# Patient Record
Sex: FEMALE | Race: WHITE | NOT HISPANIC OR LATINO | Employment: FULL TIME | URBAN - METROPOLITAN AREA
[De-identification: names, ages, dates, MRNs, and addresses within clinical notes are randomized per-mention and may not be internally consistent; named-entity substitution may affect disease eponyms.]

---

## 2017-01-10 ENCOUNTER — TELEPHONE (OUTPATIENT)
Dept: SURGERY | Facility: CLINIC | Age: 32
End: 2017-01-10

## 2017-01-10 ENCOUNTER — OFFICE VISIT (OUTPATIENT)
Dept: SURGERY | Facility: CLINIC | Age: 32
End: 2017-01-10
Payer: COMMERCIAL

## 2017-01-10 ENCOUNTER — HOSPITAL ENCOUNTER (OUTPATIENT)
Dept: GENERAL RADIOLOGY | Facility: CLINIC | Age: 32
Discharge: HOME | End: 2017-01-10
Attending: PHYSICIAN ASSISTANT | Admitting: PHYSICIAN ASSISTANT
Payer: COMMERCIAL

## 2017-01-10 VITALS
HEART RATE: 68 BPM | WEIGHT: 159.7 LBS | SYSTOLIC BLOOD PRESSURE: 118 MMHG | BODY MASS INDEX: 27.84 KG/M2 | DIASTOLIC BLOOD PRESSURE: 79 MMHG

## 2017-01-10 DIAGNOSIS — K91.2 POSTSURGICAL MALABSORPTION: ICD-10-CM

## 2017-01-10 DIAGNOSIS — Z98.84 BARIATRIC SURGERY STATUS: Primary | ICD-10-CM

## 2017-01-10 DIAGNOSIS — K21.9 GASTROESOPHAGEAL REFLUX DISEASE WITHOUT ESOPHAGITIS: ICD-10-CM

## 2017-01-10 DIAGNOSIS — E66.3 OVERWEIGHT (BMI 25.0-29.9): ICD-10-CM

## 2017-01-10 DIAGNOSIS — Z46.51 FITTING AND ADJUSTMENT OF GASTRIC LAP BAND: ICD-10-CM

## 2017-01-10 DIAGNOSIS — Z98.84 BARIATRIC SURGERY STATUS: ICD-10-CM

## 2017-01-10 PROCEDURE — S2083 ADJUSTMENT GASTRIC BAND: HCPCS | Performed by: PHYSICIAN ASSISTANT

## 2017-01-10 PROCEDURE — 74240 X-RAY XM UPR GI TRC 1CNTRST: CPT

## 2017-01-10 PROCEDURE — 99207 ZZC NO CHARGE LOS: CPT | Performed by: PHYSICIAN ASSISTANT

## 2017-01-10 RX ORDER — OMEPRAZOLE 40 MG/1
40 CAPSULE, DELAYED RELEASE ORAL DAILY
Qty: 30 CAPSULE | Refills: 0 | Status: SHIPPED | OUTPATIENT
Start: 2017-01-10 | End: 2017-04-28

## 2017-01-10 NOTE — TELEPHONE ENCOUNTER
"Patient had adjustable gastric band placed 4/2011.  She calls today with c/o pain 7-8/10 with eating/drinking and 4-5/10 without eating/drinking. \"It hurts to eat or drink anything.\"  She states the pain started approximately 2 days ago.  She reports she had the \"flu\" about 1.5 weeks ago and was dry heaving.  She is a bit nauseated at this time.  She has gotten in 30 ounces of fluid daily the last 2 days.  She states she has good U.O. - no light headedness or dizziness.  The last time she ate was last night for dinner at 730 pm. She had about 1/2 cup ham and mashed potatoes.  Suspect irritation of band vs. Slipped. Will need UGI to check for placement and fluid to come out.    Spoke with LUIS Mendez - UGI to check for band placement and then have patient come to clinic for fluid to be taken out.    Spoke with radiology and patient has 1300 UGI.    Spoke with patient and she will be at the 1300 UGI and then come to clinic to have fluid removed.  Cynthia Zapata, MS, RD, RN      "

## 2017-01-10 NOTE — PATIENT INSTRUCTIONS
PLAN:    1.  Liquid diet for 2 days  2.  Rx for omeprazole 40 mg daily if necessary for heartburn  3.  Get labs drawn  4.  Start all recommended post op vitamins  5.  Return to clinic in 1 month

## 2017-01-10 NOTE — PROGRESS NOTES
Date: 1/10/2017    RE: AXEL FINLEY  MR#: [id number]  : 1985    VITALS:   Weight: 159.7  BMI: 27.85  Wt Lost since last visit: 8.0  Cumulative Weight loss: 54.0    SUBJECTIVE:  Patient comes to the clinic today for band assessment. In regards to the patient s band, the patient feels she needs fluid removed from her band. She had the stomach flu with vomiting about 1.5 weeks ago. She felt better up until 2 days ago. Her stomach started hurting and she had a hard time eating. No vomiting this time. She has had to take drinking and eating slowly. Denies any tobacco use. Only uses ibuprofen infrequently. Did have several glasses of alcohol about 1.5 weeks ago when she was initially sick. Had UGI done this morning and it was reviewed by Dr Li and discussed with patient. No band slippage. Narrowing was noted. She is satisfied with her weight and/or weight loss. She is not exercising 3x weekly or more. She should be more active but she just has not gotten around to it.  She is not hungry between meals, not eating between meals, not able to eat >1 cup of food at meals. She is not losing 1-2 lbs a week and does not feel a sense of restriction.  She has pain when swallowing. She does not have heartburn, vomiting or reflux. She does not have night cough or hiccups. She is making poor food choices and is unable to eat chicken, steak and bread.  She does not have new illness, will not be traveling to remote areas and will not have a major surgery soon.  ASSESSMENT:  1. S/P adjustable band surgery  2. Malnutrition following GI Surgery  PLAN: After evaluation, we have elected to adjust her gastric band. Risk, benefits, and alternatives were reviewed before a consent was signed. She wishes to proceed. She will follow up in a month for subsequent assessment.  PROCEDURE: Adjustment of gastric band performed by Nikita Winter PA-C and Vanessa Severino PA-C  PROCEDURE DETAILS: In the clinic exam room, the patient was placed in  supine position on the exam table. The area over the access port was prepped with an alcohol swab, gloves were donned, and a Hurley needle and syringe were directed into the port under palpation guidance. After several attempts by Nikita Winter, a third attempt by Vanessa Severino with a new sterile Hurley needle and syringe was successful. A small amount of saline was aspirated to verify location, and 5 mls was removed from the port. This was all the fluid in the band. Access needle was withdrawn and bandaid was applied. Patient sat up and drank 8 ounces of lukewarm water without difficulty. She should return to a liquid diet and advance as tolerated. Tight band warning signs were reviewed. She left home in a stable and ambulatory condition.   Patient was also prescribed one months worth of omeprazole 40 mg tabs. She can take one per day if necessary for her heartburn symptoms. Advised she wait at least one month before considering adding fluid back to band. Reviewed her medications and she is not currently taking all of her recommended post op vitamins. Just taking one MVI daily. Ordered labs including CBC, Vitamin B12, PTH, and Vitamin D. Also discussed importance of lifestyle decisions such as excessive alcohol and NSAID use in patients with a lap band. Patient verbalized understanding.

## 2017-01-18 ENCOUNTER — TELEPHONE (OUTPATIENT)
Dept: FAMILY MEDICINE | Facility: CLINIC | Age: 32
End: 2017-01-18

## 2017-01-18 DIAGNOSIS — Z30.45 ENCOUNTER FOR SURVEILLANCE OF TRANSDERMAL PATCH HORMONAL CONTRACEPTIVE DEVICE: Primary | ICD-10-CM

## 2017-01-18 NOTE — TELEPHONE ENCOUNTER
Reason for Call: Nuvaring not covered by her insurance  Detailed comments: Pt says she went to fill her birth control yesterday and her insurance does not cover this medication. Rather than doing a prior auth, she is asking if it could be switched to a birth control patch. Wilver Manning  Phone Number Patient can be reached at: Home number on file [phone] (home)    Best Time: anytime    Can we leave a detailed message on this number? YES    Call taken on 1/18/2017 at 8:23 AM by Linda Becerra

## 2017-01-19 ENCOUNTER — TELEPHONE (OUTPATIENT)
Dept: FAMILY MEDICINE | Facility: CLINIC | Age: 32
End: 2017-01-19

## 2017-01-19 DIAGNOSIS — Z30.011 ENCOUNTER FOR INITIAL PRESCRIPTION OF CONTRACEPTIVE PILLS: Primary | ICD-10-CM

## 2017-01-19 RX ORDER — NORELGESTROMIN AND ETHINYL ESTRADIOL 35; 150 UG/MG; UG/MG
1 PATCH TRANSDERMAL WEEKLY
Qty: 9 PATCH | Refills: 3 | Status: SHIPPED | OUTPATIENT
Start: 2017-01-19 | End: 2017-01-19

## 2017-01-19 RX ORDER — DESOGESTREL AND ETHINYL ESTRADIOL 0.15-0.03
1 KIT ORAL DAILY
Qty: 84 TABLET | Refills: 3 | Status: SHIPPED | OUTPATIENT
Start: 2017-01-19 | End: 2017-05-08

## 2017-01-19 RX ORDER — ETONOGESTREL AND ETHINYL ESTRADIOL VAGINAL RING .015; .12 MG/D; MG/D
1 RING VAGINAL
Qty: 3 EACH | Status: CANCELLED | OUTPATIENT
Start: 2017-01-19

## 2017-01-19 NOTE — TELEPHONE ENCOUNTER
I entered the order for the patch. When I placed it in epic it showed that it was not covered by her insurance. We may need to do a prior auth on this.    Erna Gutierrez NP

## 2017-01-19 NOTE — TELEPHONE ENCOUNTER
Pt called yesterday about her birth control because the NuvaRing was not covered by her insurance. She requested patches but apparently that is not covered either and would require a prior auth. She says she should have started her new NuvaRing last weekend. She is willing to switch to an oral birth control pill that is covered so she doesn't have to wait.

## 2017-04-19 ENCOUNTER — VIRTUAL VISIT (OUTPATIENT)
Dept: FAMILY MEDICINE | Facility: OTHER | Age: 32
End: 2017-04-19

## 2017-04-20 NOTE — PROGRESS NOTES
Date:   Clinician: Elise De La Cruz  Clinician NPI: [id number]  Patient: Tram Bal  Patient : 1985  Patient Address: [address] [place] [address]  Patient Phone: [phone]  Visit Protocol: Allergic rhinitis  Patient Summary:  Tram is a 31 year old ( : 1985 ) female who initiated a Zip for evaluation of seasonal allergies.      She claims that his/her current allergy medications are ineffective. She notes coughing, itchy eyes, headache, plugged ears, fatigue, scratchy throat, sneezing, and runny nose with headache as the most bothersome.  These symptoms have been present for several days.   She also notes more tears than usual and facial pressure. Her mucus is clear or white.  She sneezes sometimes (a few times every hour). Smoke aggravate these symptoms.   Tram has not received allergy shots. The patient's allergy symptoms have NOT been confirmed by skin allergy testing. Family history of allergies is unknown.   She denies having nasal ulcers, a septum deviation or other sinus wounds, wheezing, dyspnea, fever, white patches on tonsils, noticing rashes appearing anywhere, and tooth pain.   She denies smoking or using smokeless tobacco.   PREVIOUS ALLERGY MEDICATIONS:     Fexofenadine (Allegra); NOT effective    Diphenhydramine (Benadryl); somewhat effective     Additional information as reported by the patient (free text): I am seeking relief from the sinus pressure and pain, and so far what I have been taking helps with other allergy symptoms but not that.   MEDICATIONS:  Over-the-counter meds:   Prescription medications:  SSRIs (such as Prozac, Celexa, Lexapro, Paxil, Luvox, or Zoloft) and Celexa  , ALLERGIES:  Patient free text response:   Famvir   The patient is able to swallow and is not concerned about having strep throat.   Clinician Response:  Dear Tram,  Based on the information you have provided, you have Adult Allergic Rhinitis commonly called hay  fever or seasonal allergy.   I am prescribing:   Dymista (azelastine/fluticasone propionate) to treat your allergy symptoms. Use one spray in each nostril two times a day. There are 6 refills included with this medication.   Singulair (montelukast) 5 mg chewable tablet to treat your allergy symptoms. Take one tablet by mouth one time a day. There are 6 refills included with this medication.   Please take the following precautions to help reduce your symptoms:     Avoid substances you are allergic to    Try to reduce the amount of humidity in your living and working environments    Consider moving pets outside of your living and/or working area    You can decrease your allergy symptoms by staying indoors as much as possible until your allergy season is over.      Diagnosis: Adult Allergic Rhinitis 12+  Diagnosis ICD: J30.9  Prescription: montelukast (Singulair) 5mg chewable tablet 30 tablets, 30 days supply. Take one tablet by mouth one time a day. Refills: 6, Refill as needed: no, Allow substitutions: yes  Prescription: Dymista (azelastine/fluticasone propionate) 137-50 mcg nasal spray  23 gm, 30 days supply. Use one spray each nostril two times a day. Refills: 6, Refill as needed: no, Allow substitutions: yes  Prescription Sent At: April 19 23:45:29, 2017  Pharmacy: [id number] - [phone] - [address] [place] [address]

## 2017-04-28 ENCOUNTER — OFFICE VISIT (OUTPATIENT)
Dept: FAMILY MEDICINE | Facility: CLINIC | Age: 32
End: 2017-04-28
Payer: COMMERCIAL

## 2017-04-28 VITALS
HEART RATE: 88 BPM | SYSTOLIC BLOOD PRESSURE: 110 MMHG | DIASTOLIC BLOOD PRESSURE: 64 MMHG | WEIGHT: 180.2 LBS | BODY MASS INDEX: 30.77 KG/M2 | HEIGHT: 64 IN

## 2017-04-28 DIAGNOSIS — Z13.6 CARDIOVASCULAR SCREENING; LDL GOAL LESS THAN 160: ICD-10-CM

## 2017-04-28 DIAGNOSIS — Z01.419 ENCOUNTER FOR GYNECOLOGICAL EXAMINATION WITHOUT ABNORMAL FINDING: Primary | ICD-10-CM

## 2017-04-28 PROCEDURE — G0145 SCR C/V CYTO,THINLAYER,RESCR: HCPCS | Performed by: NURSE PRACTITIONER

## 2017-04-28 PROCEDURE — 99395 PREV VISIT EST AGE 18-39: CPT | Performed by: NURSE PRACTITIONER

## 2017-04-28 PROCEDURE — 87624 HPV HI-RISK TYP POOLED RSLT: CPT | Performed by: NURSE PRACTITIONER

## 2017-04-28 PROCEDURE — G0124 SCREEN C/V THIN LAYER BY MD: HCPCS | Performed by: NURSE PRACTITIONER

## 2017-04-28 NOTE — NURSING NOTE
"Chief Complaint   Patient presents with     Physical       Initial /64 (Cuff Size: Adult Regular)  Pulse 88  Ht 5' 3.5\" (1.613 m)  Wt 180 lb 3.2 oz (81.7 kg)  LMP 03/31/2017 (Approximate)  BMI 31.42 kg/m2 Estimated body mass index is 31.42 kg/(m^2) as calculated from the following:    Height as of this encounter: 5' 3.5\" (1.613 m).    Weight as of this encounter: 180 lb 3.2 oz (81.7 kg).  Medication Reconciliation: complete    Health Maintenance that is potentially due pending provider review:  Pap Smear    Possibly completing today per provider review.      "

## 2017-04-28 NOTE — PROGRESS NOTES
SUBJECTIVE:     CC: Tram Bal is an 31 year old woman who presents for preventive health visit.     Healthy Habits:    Do you get at least three servings of calcium containing foods daily (dairy, green leafy vegetables, etc.)? yes    Amount of exercise or daily activities, outside of work: Dance and Volleyball     Problems taking medications regularly No    Medication side effects: No    Have you had an eye exam in the past two years? no    Do you see a dentist twice per year? yes  Do you have sleep apnea, excessive snoring or daytime drowsiness?no          Today's PHQ-2 Score:   PHQ-2 ( 1999 Pf[employer]) 4/20/2016 4/20/2015   Q1: Little interest or pleasure in doing things - 0   Q2: Feeling down, depressed or hopeless - 0   PHQ-2 Score - 0   Little interest or pleasure in doing things Not at all -   Feeling down, depressed or hopeless Not at all -   PHQ-2 Score 0 -       Abuse: Current or Past(Physical, Sexual or Emotional)- No  Do you feel safe in your environment - Yes    Social History   Substance Use Topics     Smoking status: Former Smoker     Packs/day: 0.50     Years: 3.00     Quit date: 5/1/2010     Smokeless tobacco: Never Used      Comment: quit smoking may 2010      Alcohol use Yes      Comment: occasionally     The patient does not drink >3 drinks per day nor >7 drinks per week.    Recent Labs   Lab Test  04/20/15   0947  03/31/14   1140   CHOL  216*  172   HDL  79  67   LDL  110  74   TRIG  136  159*   CHOLHDLRATIO  2.7  2.6       Reviewed orders with patient.  Reviewed health maintenance and updated orders accordingly - Yes    Mammo Decision Support:  Mammogram not appropriate for this patient based on age.    Pertinent mammograms are reviewed under the imaging tab.  History of abnormal Pap smear: YES - updated in Problem List and Health Maintenance accordingly    Reviewed and updated as needed this visit by clinical staff  Tobacco  Allergies  Meds  Med Hx  Surg Hx  Fam Hx  Soc Hx     "    Reviewed and updated as needed this visit by Provider            ROS:  C: NEGATIVE for fever, chills, change in weight  I: NEGATIVE for worrisome rashes, moles or lesions  E: NEGATIVE for vision changes or irritation  ENT: NEGATIVE for ear, mouth and throat problems  R: NEGATIVE for significant cough or SOB  B: NEGATIVE for masses, tenderness or discharge  CV: NEGATIVE for chest pain, palpitations or peripheral edema  GI: NEGATIVE for nausea, abdominal pain, heartburn, or change in bowel habits  : NEGATIVE for unusual urinary or vaginal symptoms. Periods are regular.  M: NEGATIVE for significant arthralgias or myalgia  N: NEGATIVE for weakness, dizziness or paresthesias  P: NEGATIVE for changes in mood or affect    Problem list, Medication list, Allergies, and Medical/Social/Surgical histories reviewed in EPIC and updated as appropriate.  OBJECTIVE:     /64 (Cuff Size: Adult Regular)  Pulse 88  Ht 5' 3.5\" (1.613 m)  Wt 180 lb 3.2 oz (81.7 kg)  LMP 03/31/2017 (Approximate)  BMI 31.42 kg/m2  EXAM:  GENERAL: healthy, alert and no distress  EYES: Eyes grossly normal to inspection, PERRL and conjunctivae and sclerae normal  HENT: ear canals and TM's normal, nose and mouth without ulcers or lesions  NECK: no adenopathy, no asymmetry, masses, or scars and thyroid normal to palpation  RESP: lungs clear to auscultation - no rales, rhonchi or wheezes  BREAST: normal without masses, tenderness or nipple discharge and no palpable axillary masses or adenopathy  CV: regular rate and rhythm, normal S1 S2, no S3 or S4, no murmur, click or rub, no peripheral edema and peripheral pulses strong  ABDOMEN: soft, nontender, no hepatosplenomegaly, no masses and bowel sounds normal   (female): normal female external genitalia, normal urethral meatus, vaginal mucosa pink, moist, well rugated, and normal cervix/adnexa/uterus without masses or discharge  MS: no gross musculoskeletal defects noted, no edema  SKIN: no " "suspicious lesions or rashes  NEURO: Normal strength and tone, mentation intact and speech normal  PSYCH: mentation appears normal, affect normal/bright    ASSESSMENT/PLAN:     1. Encounter for gynecological examination without abnormal finding    - Pap imaged thin layer screen with HPV - recommended age 30 - 65  - HPV High Risk Types DNA Cervical  - TSH with free T4 reflex; Future    2. CARDIOVASCULAR SCREENING; LDL GOAL LESS THAN 160    - Lipid Profile with reflex to direct LDL; Future    COUNSELING:   Reviewed preventive health counseling, as reflected in patient instructions         reports that she quit smoking about 6 years ago. She has a 1.50 pack-year smoking history. She has never used smokeless tobacco.    Estimated body mass index is 31.42 kg/(m^2) as calculated from the following:    Height as of this encounter: 5' 3.5\" (1.613 m).    Weight as of this encounter: 180 lb 3.2 oz (81.7 kg).   Weight management plan: Discussed healthy diet and exercise guidelines and patient will follow up in 12 months in clinic to re-evaluate.    Counseling Resources:  ATP IV Guidelines  Pooled Cohorts Equation Calculator  Breast Cancer Risk Calculator  FRAX Risk Assessment  ICSI Preventive Guidelines  Dietary Guidelines for Americans, 2010  USDA's MyPlate  ASA Prophylaxis  Lung CA Screening    DEON Rosario [facility]  "

## 2017-04-28 NOTE — MR AVS SNAPSHOT
After Visit Summary   4/28/2017    Tram Bal    MRN: [id number]           Patient Information     Date Of Birth          1985        Visit Information        Provider Department      4/28/2017 11:20 AM Trudi Guevara APRN [facility]        Today's Diagnoses     Encounter for gynecological examination without abnormal finding    -  1    CARDIOVASCULAR SCREENING; LDL GOAL LESS THAN 160          Care Instructions      Preventive Health Recommendations  Female Ages 26 - 39  Yearly exam:   See your health care provider every year in order to    Review health changes.     Discuss preventive care.      Review your medicines if you your doctor has prescribed any.    Until age 30: Get a Pap test every three years (more often if you have had an abnormal result).    After age 30: Talk to your doctor about whether you should have a Pap test every 3 years or have a Pap test with HPV screening every 5 years.   You do not need a Pap test if your uterus was removed (hysterectomy) and you have not had cancer.  You should be tested each year for STDs (sexually transmitted diseases), if you're at risk.   Talk to your provider about how often to have your cholesterol checked.  If you are at risk for diabetes, you should have a diabetes test (fasting glucose).  Shots: Get a flu shot each year. Get a tetanus shot every 10 years.   Nutrition:     Eat at least 5 servings of fruits and vegetables each day.    Eat whole-grain bread, whole-wheat pasta and brown rice instead of white grains and rice.    Talk to your provider about Calcium and Vitamin D.     Lifestyle    Exercise at least 150 minutes a week (30 minutes a day, 5 days of the week). This will help you control your weight and prevent disease.    Limit alcohol to one drink per day.    No smoking.     Wear sunscreen to prevent skin cancer.    See your dentist every six months for an exam and cleaning.          Follow-ups after  "your visit        Future tests that were ordered for you today     Open Future Orders        Priority Expected Expires Ordered    TSH with free T4 reflex Routine  4/28/2018 4/28/2017    Lipid Profile with reflex to direct LDL Routine  4/28/2018 4/28/2017            Who to contact     If you have questions or need follow up information about today's clinic visit or your schedule please contact [facility] directly at [phone].  Normal or non-critical lab and imaging results will be communicated to you by Rapid[employer]ines[employer], letter or phone within 4 business days after the clinic has received the results. If you do not hear from us within 7 days, please contact the clinic through [facility] or phone. If you have a critical or abnormal lab result, we will notify you by phone as soon as possible.  Submit refill requests through [facility] or call your pharmacy and they will forward the refill request to us. Please allow 3 business days for your refill to be completed.          Additional Information About Your Visit        R[employer]ines[employer] Information     [facility] gives you secure access to your electronic health record. If you see a primary care provider, you can also send messages to your care team and make appointments. If you have questions, please call your primary care clinic.  If you do not have a primary care provider, please call [phone] and they will assist you.        Care EveryWhere ID     This is your Care EveryWhere ID. This could be used by other organizations to access your [facility] medical records  [id number]        Your Vitals Were     Pulse Height Last Period BMI (Body Mass Index)          88 5' 3.5\" (1.613 m) 03/31/2017 (Approximate) 31.42 kg/m2         Blood Pressure from Last 3 Encounters:   04/28/17 110/64   01/10/17 118/79   07/18/16 112/74    Weight from Last 3 Encounters:   04/28/17 180 lb 3.2 oz (81.7 kg)   01/10/17 159 lb 11.2 oz (72.4 kg)   07/18/16 158 lb (71.7 kg)              We " Performed the Following     HPV High Risk Types DNA Cervical     Pap imaged thin layer screen with HPV - recommended age 30 - 65          Today's Medication Changes          These changes are accurate as of: 4/28/17 11:58 AM.  If you have any questions, ask your nurse or doctor.               Stop taking these medicines if you haven't already. Please contact your care team if you have questions.     omeprazole 40 MG capsule   Commonly known as:  priLOSEC   Stopped by:  Trudi Guevara APRN CNP                    Primary Care Provider Office Phone # Fax #    DEON Ochoa [phone]       [facility] [address] [address]        Thank you!     Thank you for choosing [facility]  for your care. Our goal is always to provide you with excellent care. Hearing back from our patients is one way we can continue to improve our services. Please take a few minutes to complete the written survey that you may receive in the mail after your visit with us. Thank you!             Your Updated Medication List - Protect others around you: Learn how to safely use, store and throw away your medicines at www.disposemymeds.org.          This list is accurate as of: 4/28/17 11:58 AM.  Always use your most recent med list.                   Brand Name Dispense Instructions for use    acyclovir 400 MG tablet    ZOVIRAX    30 tablet    Take 1 tablet (400 mg) by mouth 3 times daily       citalopram 20 MG tablet    celeXA    90 tablet    Take 1 tablet (20 mg) by mouth daily       cyclobenzaprine 10 MG tablet    FLEXERIL    14 tablet    Take 0.5-1 tablets by mouth 3 times daily as needed for muscle spasms.       desogestrel-ethinyl estradiol 0.15-30 MG-MCG per tablet    APRI    84 tablet    Take 1 tablet by mouth daily       Multiple Vitamins-Iron Tabs      Take by mouth daily

## 2017-04-28 NOTE — LETTER
[facility]  [address] [place] [id number]  [phone]        May 8, 2018    Tram Bal  [address] [place] [id number]              Dear Tram Bal    This is to remind you that your provider wanted you to return to the clinic for fasting lab test(s),    please fast for 10-12 hours. Morning medications can be taken with water.    You may call our office at [facility] at [phone] to schedule an appointment.    Please disregard this notice if you have already had your labs drawn or made an appointment.          Sincerely,        Trudi Guevara CNP

## 2017-05-03 LAB
COPATH REPORT: ABNORMAL
PAP: ABNORMAL

## 2017-05-05 LAB
FINAL DIAGNOSIS: ABNORMAL
HPV HR 12 DNA CVX QL NAA+PROBE: POSITIVE
HPV16 DNA SPEC QL NAA+PROBE: NEGATIVE
HPV18 DNA SPEC QL NAA+PROBE: NEGATIVE
SPECIMEN DESCRIPTION: ABNORMAL

## 2017-05-07 ENCOUNTER — MYC MEDICAL ADVICE (OUTPATIENT)
Dept: FAMILY MEDICINE | Facility: CLINIC | Age: 32
End: 2017-05-07

## 2017-05-07 DIAGNOSIS — Z98.84 LAP-BAND SURGERY STATUS: ICD-10-CM

## 2017-05-07 DIAGNOSIS — Z30.015 ENCOUNTER FOR INITIAL PRESCRIPTION OF VAGINAL RING HORMONAL CONTRACEPTIVE: Primary | ICD-10-CM

## 2017-05-08 RX ORDER — ETONOGESTREL AND ETHINYL ESTRADIOL VAGINAL RING .015; .12 MG/D; MG/D
RING VAGINAL
Qty: 9 EACH | Refills: 3 | Status: SHIPPED | OUTPATIENT
Start: 2017-05-08 | End: 2017-08-11

## 2017-05-22 ENCOUNTER — TELEPHONE (OUTPATIENT)
Dept: FAMILY MEDICINE | Facility: CLINIC | Age: 32
End: 2017-05-22

## 2017-05-22 ENCOUNTER — OFFICE VISIT (OUTPATIENT)
Dept: FAMILY MEDICINE | Facility: CLINIC | Age: 32
End: 2017-05-22
Payer: COMMERCIAL

## 2017-05-22 ENCOUNTER — OFFICE VISIT (OUTPATIENT)
Dept: SURGERY | Facility: CLINIC | Age: 32
End: 2017-05-22
Payer: COMMERCIAL

## 2017-05-22 VITALS
HEART RATE: 72 BPM | WEIGHT: 185.8 LBS | DIASTOLIC BLOOD PRESSURE: 76 MMHG | SYSTOLIC BLOOD PRESSURE: 120 MMHG | BODY MASS INDEX: 31.89 KG/M2 | TEMPERATURE: 98.8 F

## 2017-05-22 VITALS — WEIGHT: 185.8 LBS | BODY MASS INDEX: 31.72 KG/M2 | HEIGHT: 64 IN

## 2017-05-22 DIAGNOSIS — E66.9 OBESITY (BMI 30-39.9): ICD-10-CM

## 2017-05-22 DIAGNOSIS — Z46.51 FITTING AND ADJUSTMENT OF GASTRIC LAP BAND: ICD-10-CM

## 2017-05-22 DIAGNOSIS — F41.1 GENERALIZED ANXIETY DISORDER: Primary | ICD-10-CM

## 2017-05-22 DIAGNOSIS — R87.610 PAPANICOLAOU SMEAR OF CERVIX WITH ATYPICAL SQUAMOUS CELLS OF UNDETERMINED SIGNIFICANCE (ASC-US): Primary | ICD-10-CM

## 2017-05-22 DIAGNOSIS — Z98.84 BARIATRIC SURGERY STATUS: Primary | ICD-10-CM

## 2017-05-22 PROCEDURE — 88305 TISSUE EXAM BY PATHOLOGIST: CPT | Performed by: FAMILY MEDICINE

## 2017-05-22 PROCEDURE — 88175 CYTOPATH C/V AUTO FLUID REDO: CPT | Performed by: FAMILY MEDICINE

## 2017-05-22 PROCEDURE — 99207 ZZC NO CHARGE LOS: CPT | Performed by: PHYSICIAN ASSISTANT

## 2017-05-22 PROCEDURE — 88141 CYTOPATH C/V INTERPRET: CPT | Performed by: FAMILY MEDICINE

## 2017-05-22 PROCEDURE — S2083 ADJUSTMENT GASTRIC BAND: HCPCS | Performed by: PHYSICIAN ASSISTANT

## 2017-05-22 PROCEDURE — 57421 EXAM/BIOPSY OF VAG W/SCOPE: CPT | Performed by: FAMILY MEDICINE

## 2017-05-22 PROCEDURE — G0476 HPV COMBO ASSAY CA SCREEN: HCPCS | Performed by: FAMILY MEDICINE

## 2017-05-22 ASSESSMENT — ANXIETY QUESTIONNAIRES
6. BECOMING EASILY ANNOYED OR IRRITABLE: NEARLY EVERY DAY
5. BEING SO RESTLESS THAT IT IS HARD TO SIT STILL: MORE THAN HALF THE DAYS
7. FEELING AFRAID AS IF SOMETHING AWFUL MIGHT HAPPEN: SEVERAL DAYS
1. FEELING NERVOUS, ANXIOUS, OR ON EDGE: NEARLY EVERY DAY
3. WORRYING TOO MUCH ABOUT DIFFERENT THINGS: MORE THAN HALF THE DAYS
GAD7 TOTAL SCORE: 14
2. NOT BEING ABLE TO STOP OR CONTROL WORRYING: MORE THAN HALF THE DAYS

## 2017-05-22 ASSESSMENT — PATIENT HEALTH QUESTIONNAIRE - PHQ9: 5. POOR APPETITE OR OVEREATING: SEVERAL DAYS

## 2017-05-22 NOTE — PATIENT INSTRUCTIONS
Bariatric Postoperative Vitamins     1 Complete multivitamins with minerals* (at different times than calcium)   2 tablets at bedtime   Vitamin D   2000 Int. Units Daily     Calcium+D  1043-7383 mg in divided doses (Do not take at same time as multivitamin or iron)  500 mg three times daily or 600 mg twice daily.    For menstruating women:  Iron supplement with vitamin C   1 at bedtime   *Multivitamin should contain at least     18 mg of Iron, 400 mg of Folic Acid,  2 mg of Copper,  1.5 mg of Thiamine.      Band Post-Adjustment Instructions    After an adjustment, it is very important to change your diet to full liquids for 2-3 days afterwards.  If this consistency goes down well, advance your diet as tolerated back to solids over the next couple of days.    If you are in the red zone.  Please call [phone] and come back to the clinic right away so we can remove some fluid.    Green Zone:  Not hungry  Losing 1-2 lbs a week  Portion Control  Patient satisfaction  Red Zone:  reflux  vomiting  pain when eating, night cough  inability to eat solids  poor weight loss  Yellow Zone:  hungry between meals  not losing weight  eating larger portions

## 2017-05-22 NOTE — PROGRESS NOTES
SUBJECTIVE:                                                    Tram Bal is a 31 year old female who presents to clinic today for the following health issues:      Colposcopy        Pt with no sxs ASCUS pap with pos HPV other HR type          Previous history of abnormal paps?: Yes see problem list   History of cryotherapy (freezing)?: : No  History of veneral diseases: : No  Do you desire testing for any of these diseases? : No  History of genital warts:  No  Visible warts now?:  No  Previously treated? If so, how?:  No     Patient Active Problem List    Diagnosis Date Noted                                                                                                                                                             Carcinoma in situ of cervix uteri 08/22/2005     Priority: Medium     7/04: LSIL, 10/04: [place]- JARETH I  4/05: LSIL. 6/05: [place] - JARETH II & III  8/05: LEEP - JARETH III  12/05: NIL pap  4/06: ASCUS, neg HPV  NIL paps: 10/06, 11/07, 12/08  1/10: ASCUS, neg HPV  NIL paps: 2/11, 4/10/12 pap NIL  2/20/13 pap NIL  3/31/14 pap NIL/neg HPV. Plan-- repeat pap/HPV in 1 year. If NIL/neg HPV again at that time, patient will be able to go to q 3 yr testing.  4/20/15 pap NIL/neg HPV. Plan: repeat pap and HPV testing in 3 years.  4/25/16: Pap - ASCUS, Neg HPV. Cotest in 1 year.   4/28/17:Pap: ASCUS, +High Risk HPV (Neg 16/18).  Plan [place]-                        Problem list and histories reviewed & adjusted, as indicated.  Additional history: as documented    Labs reviewed in EPIC    Reviewed and updated as needed this visit by clinical staff  Tobacco  Allergies  Meds  Problems  Med Hx  Surg Hx  Fam Hx  Soc Hx        Reviewed and updated as needed this visit by Provider  Allergies  Meds  Problems         ROS:  Constitutional, HEENT, cardiovascular, pulmonary, gi and gu systems are negative, except as otherwise noted.    OBJECTIVE:                                                    /76  (BP Location: Right arm, Patient Position: Chair, Cuff Size: Adult Large)  Pulse 72  Temp 98.8  F (37.1  C) (Tympanic)  Wt 185 lb 12.8 oz (84.3 kg)  LMP 03/31/2017 (Approximate)  Breastfeeding? No  BMI 31.89 kg/m2  Body mass index is 31.89 kg/(m^2).  GENERAL APPEARANCE: healthy, alert and no distress   (female): normal cervix, adnexae, and uterus without masses or discharge  PSYCH: mentation appears normal and affect normal/bright           PROCEDURE:  Before the procedure, it was ensured that the patient was educated regarding the nature of her findings to date, the implications of them, and what was to be done. She has been made aware of the role of HPV, the natural history of infection, ways to minimize her future risk, the effect of HPV on the cervix, and treatment options available should they be indicated. The   pathophysiology of the cervix, including a discussion of squamous vs. endometrial cells, and squamous metaplasia have all been reviewed, using illustrations and sketches. The details of the colposcopic procedure were reviewed, as well as the risks of missed diagnoses, pain, infection and bleeding. All questions were answered before proceeding, and informed consent was therefore obtained.    Bimanual examination: was not done  Unenhanced examination of the cervix was normal without lesions.      Pap repeated?:  Yes   SCJ seen?:  yes  Endocervical speculum needed?:  No  ECC done?:  Yes   Lugol's solution used?:  No  Satisfactory examination?:  yes    Vaginal vault: normal to cursory inspection nl  Urethra normal?:  yes  Labia normal?:  yes  Perineum normal?:  yes  Rectum normal?:  yes    FINDINGS:    Cervix: no visible lesions  Procedure: biopsies taken (not including ECC): 1.         ASSESSMENT/PLAN:                                                    1. Papanicolaou smear of cervix with atypical squamous cells of undetermined significance (ASC-US)    - Pap imaged thin layer diagnostic with HPV  (select HPV order below)  - Surgical pathology exam  - COLP VAGINA W CERVIX IF PRES W BIOPSY      Patient Instructions   No sex or tampons for 2-3 days    I will call you with results of pathology in the next one week     Risks, benefits, side effects and rationale for treatment plan fully discussed with the patient and understanding expressed.   Batool Liang MD  [facility]

## 2017-05-22 NOTE — NURSING NOTE
"Chief Complaint   Patient presents with     Colposcopy       Initial /76 (BP Location: Right arm, Patient Position: Chair, Cuff Size: Adult Large)  Pulse 72  Temp 98.8  F (37.1  C) (Tympanic)  Wt 185 lb 12.8 oz (84.3 kg)  LMP 03/31/2017 (Approximate)  Breastfeeding? No  BMI 31.89 kg/m2 Estimated body mass index is 31.89 kg/(m^2) as calculated from the following:    Height as of an earlier encounter on 5/22/17: 5' 4\" (1.626 m).    Weight as of this encounter: 185 lb 12.8 oz (84.3 kg).  Medication Reconciliation: complete    Health Maintenance that is potentially due pending provider review:  PHQ9    n/a    "

## 2017-05-22 NOTE — MR AVS SNAPSHOT
After Visit Summary   5/22/2017    Tram Bal    MRN: [id number]           Patient Information     Date Of Birth          1985        Visit Information        Provider Department      5/22/2017 1:20 PM Batool Liang MD [facility]        Today's Diagnoses     Carcinoma in situ of cervix uteri    -  1      Care Instructions    No sex or tampons for 2-3 days    I will call you with results of pathology in the next one week         Follow-ups after your visit        Who to contact     If you have questions or need follow up information about today's clinic visit or your schedule please contact [facility] directly at [phone].  Normal or non-critical lab and imaging results will be communicated to you by MyChart, letter or phone within 4 business days after the clinic has received the results. If you do not hear from us within 7 days, please contact the clinic through Forerunhart or phone. If you have a critical or abnormal lab result, we will notify you by phone as soon as possible.  Submit refill requests through DineroTaxi or call your pharmacy and they will forward the refill request to us. Please allow 3 business days for your refill to be completed.          Additional Information About Your Visit        MyChart Information     DineroTaxi gives you secure access to your electronic health record. If you see a primary care provider, you can also send messages to your care team and make appointments. If you have questions, please call your primary care clinic.  If you do not have a primary care provider, please call [phone] and they will assist you.        Care EveryWhere ID     This is your Care EveryWhere ID. This could be used by other organizations to access your [facility] medical records  [id number]        Your Vitals Were     Pulse Temperature Last Period Breastfeeding? BMI (Body Mass Index)       72 98.8  F (37.1  C) (Tympanic) 03/31/2017  (Approximate) No 31.89 kg/m2        Blood Pressure from Last 3 Encounters:   05/22/17 120/76   04/28/17 110/64   01/10/17 118/79    Weight from Last 3 Encounters:   05/22/17 185 lb 12.8 oz (84.3 kg)   05/22/17 185 lb 12.8 oz (84.3 kg)   04/28/17 180 lb 3.2 oz (81.7 kg)              We Performed the Following     Pap imaged thin layer diagnostic with HPV (select HPV order below)     Surgical pathology exam        Primary Care Provider Office Phone # Fax #    Trudi Guevara DEON [phone]       [facility] [address] [address]        Thank you!     Thank you for choosing [facility]  for your care. Our goal is always to provide you with excellent care. Hearing back from our patients is one way we can continue to improve our services. Please take a few minutes to complete the written survey that you may receive in the mail after your visit with us. Thank you!             Your Updated Medication List - Protect others around you: Learn how to safely use, store and throw away your medicines at www.disposemymeds.org.          This list is accurate as of: 5/22/17  2:03 PM.  Always use your most recent med list.                   Brand Name Dispense Instructions for use    acyclovir 400 MG tablet    ZOVIRAX    30 tablet    Take 1 tablet (400 mg) by mouth 3 times daily       citalopram 20 MG tablet    celeXA    90 tablet    Take 1 tablet (20 mg) by mouth daily       etonogestrel-ethinyl estradiol 0.12-0.015 MG/24HR vaginal ring    NUVARING    9 each    Insert 1 ring vaginally every 21 days then remove for 1 week then repeat with new ring.       Multiple Vitamins-Iron Tabs      Take by mouth daily

## 2017-05-22 NOTE — TELEPHONE ENCOUNTER
Tram states she would like to talk to you regarding medication changes for her depression and anxiety.  Symptoms seem to have worsened since she saw you a couple weeks ago and she wonders if she needs a medication change.  PHQ 9 completed today scored 17.     CLARA scored: 14.  She states she is not a danger to herself or others.  Please call

## 2017-05-22 NOTE — MR AVS SNAPSHOT
After Visit Summary   5/22/2017    Tram Bal    MRN: [id number]           Patient Information     Date Of Birth          1985        Visit Information        Provider Department      5/22/2017 9:30 AM Vanessa Severino PA-C [facility]      Today's Diagnoses     Bariatric surgery status    -  1    Obesity (BMI 30-39.9)          Care Instructions    Bariatric Postoperative Vitamins     1 Complete multivitamins with minerals* (at different times than calcium)   2 tablets at bedtime   Vitamin D   2000 Int. Units Daily     Calcium+D  8384-5514 mg in divided doses (Do not take at same time as multivitamin or iron)  500 mg three times daily or 600 mg twice daily.    For menstruating women:  Iron supplement with vitamin C   1 at bedtime   *Multivitamin should contain at least     18 mg of Iron, 400 mg of Folic Acid,  2 mg of Copper,  1.5 mg of Thiamine.      Band Post-Adjustment Instructions    After an adjustment, it is very important to change your diet to full liquids for 2-3 days afterwards.  If this consistency goes down well, advance your diet as tolerated back to solids over the next couple of days.    If you are in the red zone.  Please call [phone] and come back to the clinic right away so we can remove some fluid.    Green Zone:  Not hungry  Losing 1-2 lbs a week  Portion Control  Patient satisfaction  Red Zone:  reflux  vomiting  pain when eating, night cough  inability to eat solids  poor weight loss  Yellow Zone:  hungry between meals  not losing weight  eating larger portions          Follow-ups after your visit        Your next 10 appointments already scheduled     May 22, 2017  1:20 PM CDT   SHORT with Batool Liang MD   [facility]    [address] [place] [address] [phone]              Who to contact     If you  "have questions or need follow up information about today's clinic visit or your schedule please contact [facility] directly at [phone].  Normal or non-critical lab and imaging results will be communicated to you by MyChart, letter or phone within 4 business days after the clinic has received the results. If you do not hear from us within 7 days, please contact the clinic through GameSaladhart or phone. If you have a critical or abnormal lab result, we will notify you by phone as soon as possible.  Submit refill requests through MyParichay or call your pharmacy and they will forward the refill request to us. Please allow 3 business days for your refill to be completed.          Additional Information About Your Visit        MyParichay Information     MyParichay gives you secure access to your electronic health record. If you see a primary care provider, you can also send messages to your care team and make appointments. If you have questions, please call your primary care clinic.  If you do not have a primary care provider, please call [phone] and they will assist you.        Care EveryWhere ID     This is your Care EveryWhere ID. This could be used by other organizations to access your [facility] medical records  [id number]        Your Vitals Were     Height Last Period BMI (Body Mass Index)             5' 4\" (1.626 m) 03/31/2017 (Approximate) 31.89 kg/m2          Blood Pressure from Last 3 Encounters:   04/28/17 110/64   01/10/17 118/79   07/18/16 112/74    Weight from Last 3 Encounters:   05/22/17 185 lb 12.8 oz (84.3 kg)   04/28/17 180 lb 3.2 oz (81.7 kg)   01/10/17 159 lb 11.2 oz (72.4 kg)              We Performed the Following     OP ROOMING NOTE TO ADALBERTO        Primary Care Provider Office Phone # Fax #    DEON Ochoa [phone]       [facility] [address] [facility] [address]        Thank you!     Thank you for choosing " [facility]  for your care. Our goal is always to provide you with excellent care. Hearing back from our patients is one way we can continue to improve our services. Please take a few minutes to complete the written survey that you may receive in the mail after your visit with us. Thank you!             Your Updated Medication List - Protect others around you: Learn how to safely use, store and throw away your medicines at www.disposemymeds.org.          This list is accurate as of: 5/22/17  9:58 AM.  Always use your most recent med list.                   Brand Name Dispense Instructions for use    acyclovir 400 MG tablet    ZOVIRAX    30 tablet    Take 1 tablet (400 mg) by mouth 3 times daily       citalopram 20 MG tablet    celeXA    90 tablet    Take 1 tablet (20 mg) by mouth daily       cyclobenzaprine 10 MG tablet    FLEXERIL    14 tablet    Take 0.5-1 tablets by mouth 3 times daily as needed for muscle spasms.       etonogestrel-ethinyl estradiol 0.12-0.015 MG/24HR vaginal ring    NUVARING    9 each    Insert 1 ring vaginally every 21 days then remove for 1 week then repeat with new ring.       Multiple Vitamins-Iron Tabs      Take by mouth daily

## 2017-05-22 NOTE — PROGRESS NOTES
Date: 2017    RE: AXEL FINLEY  MR#: [id number]  : 1985    VITALS:          Weight: 185          BMI: 31.7         Wt Lost since last visit: -26.0         Cumulative Weight loss: 28.0      SUBJECTIVE:  Patient comes to the clinic today for band assessment.  In regards to the patient s band, the patient feels she needs fluid added to her band.We took all her fluid out in January because she was too tight.  She has had no restriction since.  She lives about [place] minutes from which is why she hasn't returned sooner.   She is not satisfied with her weight and/or weight loss.   She is not exercising 3x weekly or more.  She should be more active but states her daughter is involved in dance competitions that are taking priority.  She continues to take a dance class and play volleyball on the same day once weekly.     She is hungry between meals, eating between meals, able to eat >1 cup of food at meals. She is not losing 1-2 lbs a week and does not feel a sense of restriction.    She does not have pain when swallowing. She does not have heartburn, vomiting or reflux. She does not have night cough or hiccups. She is making poor food choices and is able to eat chicken, steak and bread.    She does not have new illness, will not be traveling to remote areas and will not have a major surgery soon.    ASSESSMENT:    1.  S/P adjustable band surgery  2.  Malnutrition following GI Surgery    PLAN: After evaluation, we have elected to adjust her gastric band. Risk, benefits, and alternatives were reviewed before a consent was signed. She wishes to proceed. She will follow up in 1 month for subsequent assessment.  I have given her a list of  the recommended post operative vitamin schedule in pt instructions.  She has her vitamin labs ordered by her PCP.      PROCEDURE: Adjustment of gastric band performed by Vanessa Severino PA-C    PROCEDURE DETAILS: In the clinic exam room, the patient was placed in supine position on  the exam table. The area over the access port was prepped with an alcohol swab, gloves were donned, and a Hurley needle and syringe were directed into the port under palpation guidance. Give was felt when I was in the port and 3.0 mls was easily delivered into the port without resistance. Access needle was withdrawn and bandaid was applied. Patient sat up and drank 4 ounces of lukewarm water without difficulty. She should return to a liquid diet and advance as tolerated. Tight band warning signs were reviewed.  She left home in a stable and ambulatory condition.

## 2017-05-23 RX ORDER — CITALOPRAM HYDROBROMIDE 40 MG/1
40 TABLET ORAL DAILY
Qty: 30 TABLET | Refills: 3 | Status: SHIPPED | OUTPATIENT
Start: 2017-05-23 | End: 2017-10-13

## 2017-05-23 ASSESSMENT — ANXIETY QUESTIONNAIRES: GAD7 TOTAL SCORE: 14

## 2017-05-23 ASSESSMENT — PATIENT HEALTH QUESTIONNAIRE - PHQ9: SUM OF ALL RESPONSES TO PHQ QUESTIONS 1-9: 17

## 2017-05-23 NOTE — TELEPHONE ENCOUNTER
Spoke with Tram with worsening symptoms will increase to 40 mg of Celexa.  Potential side effects discussed including but not limited to increased risk of self harm or suicide.  Tram verbalized understanding of risks and will follow up right away with any worsening symptoms.  Follow up in 1-2 months for a recheck.    DEON Rosario CNP

## 2017-05-24 LAB — COPATH REPORT: NORMAL

## 2017-05-26 LAB
COPATH REPORT: ABNORMAL
PAP: ABNORMAL

## 2017-06-12 ENCOUNTER — TELEPHONE (OUTPATIENT)
Dept: FAMILY MEDICINE | Facility: CLINIC | Age: 32
End: 2017-06-12

## 2017-06-12 NOTE — LETTER
[place] [id number] [address]  Phone: [phone]  Fax: [phone]      June 19, 2017      Tram Bal  [address] [place] [address]              Dear Ms. Bal,        Enclosed is a copy of the form that we faxed to [facility] for your request for a cat.  This copy is for your records.           Sincerely,    Sally Guevara, CNP

## 2017-06-26 ENCOUNTER — OFFICE VISIT (OUTPATIENT)
Dept: SURGERY | Facility: CLINIC | Age: 32
End: 2017-06-26
Payer: COMMERCIAL

## 2017-06-26 VITALS — WEIGHT: 179.1 LBS | BODY MASS INDEX: 30.58 KG/M2 | HEIGHT: 64 IN

## 2017-06-26 DIAGNOSIS — Z46.51 FITTING AND ADJUSTMENT OF GASTRIC LAP BAND: ICD-10-CM

## 2017-06-26 DIAGNOSIS — Z98.84 BARIATRIC SURGERY STATUS: ICD-10-CM

## 2017-06-26 DIAGNOSIS — E66.9 OBESITY (BMI 30-39.9): Primary | ICD-10-CM

## 2017-06-26 PROCEDURE — S2083 ADJUSTMENT GASTRIC BAND: HCPCS | Performed by: PHYSICIAN ASSISTANT

## 2017-06-26 PROCEDURE — 99207 ZZC NO CHARGE LOS: CPT | Performed by: PHYSICIAN ASSISTANT

## 2017-06-26 NOTE — PROGRESS NOTES
2017  RE: AXEL FINLEY  MR#: [id number]  : 1985      VITALS:   Weight: 179.1  BMI: 30.7  Wt Lost since last visit: 5.9  Cumulative Weight loss: 33.9      SUBJECTIVE:  Patient comes to the clinic today for band assessment. In regards to the patient s band, the patient feels she needs fluid added to her band. We removed  fluid in January and have been slowly adding it back in. She is not satisfied with her weight and/or weight loss. She is not exercising 3x weekly or more. She should be more active but she just has not gotten around to it. She has lost 6 labs this month.    She is hungry between meals, not eating between meals, able to eat >1 cup of food at meals. She is not losing about 1 lbs a week and feels some sense of restriction.    She does not have pain when swallowing. She does not have heartburn, vomiting or reflux. She does not have night cough or hiccups. She is making healthy food choices and is able to eat chicken, steak and bread.    She does not have new illness, will not be traveling to remote areas and will not have a major surgery soon.    ASSESSMENT:  1. S/P adjustable band surgery  2. Malnutrition following GI Surgery    PLAN: After evaluation, we have elected to adjust her gastric band. Risk, benefits, and alternatives were reviewed before a consent was signed. She wishes to proceed. She will follow up in 1 month for subsequent assessment.    PROCEDURE: Adjustment of gastric band performed by Vanessa Severino PA-C    PROCEDURE DETAILS: In the clinic exam room, the patient was placed in supine position on the exam table. The area over the access port was prepped with an alcohol swab, gloves were donned, and a Hurley needle and syringe were directed into the port under palpation guidance. A small amount of saline was aspirated to verify location, and 1.5 ml of saline was delivered into the port.  Access needle was withdrawn and bandaid was applied. Patient sat up and drank 4 ounces of  lukewarm water without difficulty. She should return to a liquid diet and advance as tolerated. Tight band warning signs were reviewed.  She left home in a stable and ambulatory condition.

## 2017-06-26 NOTE — MR AVS SNAPSHOT
After Visit Summary   6/26/2017    Tram Bal    MRN: [id number]           Patient Information     Date Of Birth          1985        Visit Information        Provider Department      6/26/2017 3:30 PM Vanessa Severino PA-C [facility]      Today's Diagnoses     Bariatric surgery status        Fitting and adjustment of gastric lap band          Care Instructions    Band Post-Adjustment Instructions    After an adjustment, it is very important to change your diet to full liquids for 2-3 days afterwards.  If this consistency goes down well, advance your diet as tolerated back to solids over the next couple of days.    If you are in the red zone.  Please call [phone] and come back to the clinic right away so we can remove some fluid.    Green Zone:  Not hungry  Losing 1-2 lbs a week  Portion Control  Patient satisfaction  Red Zone:  reflux  vomiting  pain when eating, night cough  inability to eat solids  poor weight loss  Yellow Zone:  hungry between meals  not losing weight  eating larger portions            Follow-ups after your visit        Follow-up notes from your care team     Return if symptoms worsen or fail to improve, for Band Assessment.      Who to contact     If you have questions or need follow up information about today's clinic visit or your schedule please contact [facility] directly at [phone].  Normal or non-critical lab and imaging results will be communicated to you by MyChart, letter or phone within 4 business days after the clinic has received the results. If you do not hear from us within 7 days, please contact the clinic through MyChart or phone. If you have a critical or abnormal lab result, we will notify you by phone as soon as possible.  Submit refill requests through [employer] or call your pharmacy and they will forward the refill request  "to us. Please allow 3 business days for your refill to be completed.          Additional Information About Your Visit        Who@hart Information     Burt gives you secure access to your electronic health record. If you see a primary care provider, you can also send messages to your care team and make appointments. If you have questions, please call your primary care clinic.  If you do not have a primary care provider, please call [phone] and they will assist you.        Care EveryWhere ID     This is your Care EveryWhere ID. This could be used by other organizations to access your [place] medical records  [id number]        Your Vitals Were     Height BMI (Body Mass Index)                5' 4\" (1.626 m) 30.74 kg/m2           Blood Pressure from Last 3 Encounters:   05/22/17 120/76   04/28/17 110/64   01/10/17 118/79    Weight from Last 3 Encounters:   06/26/17 179 lb 1.6 oz (81.2 kg)   05/22/17 185 lb 12.8 oz (84.3 kg)   05/22/17 185 lb 12.8 oz (84.3 kg)              We Performed the Following     Lap Band Adjustment - Clinic        Primary Care Provider Office Phone # Fax #    Trudi DEON Neff [phone]       [place] [address]        Equal Access to Services     KELLY FRIED : Hadii aad ku hadasho Soomaali, waaxda luqadaha, qaybta kaalmada adeegyada, ledy reynolds haykisha harrington . So [facility] [phone].    ATENCIÓN: Si habla español, tiene a irwin disposición servicios gratuitos de asistencia lingüística. Llame al [phone].    We comply with applicable federal civil rights laws and [place] laws. We do not discriminate on the basis of race, color, national origin, age, disability sex, sexual orientation or gender identity.            Thank you!     Thank you for choosing [facility]  for your care. Our goal is always to provide you with excellent care. Hearing back from our patients is " one way we can continue to improve our services. Please take a few minutes to complete the written survey that you may receive in the mail after your visit with us. Thank you!             Your Updated Medication List - Protect others around you: Learn how to safely use, store and throw away your medicines at www.disposemymeds.org.          This list is accurate as of: 6/26/17  3:43 PM.  Always use your most recent med list.                   Brand Name Dispense Instructions for use Diagnosis    acyclovir 400 MG tablet    ZOVIRAX    30 tablet    Take 1 tablet (400 mg) by mouth 3 times daily    Recurrent cold sores       * citalopram 20 MG tablet    celeXA    90 tablet    Take 1 tablet (20 mg) by mouth daily    CLARA (generalized anxiety disorder)       * citalopram 40 MG tablet    celeXA    30 tablet    Take 1 tablet (40 mg) by mouth daily    Generalized anxiety disorder       etonogestrel-ethinyl estradiol 0.12-0.015 MG/24HR vaginal ring    NUVARING    9 each    Insert 1 ring vaginally every 21 days then remove for 1 week then repeat with new ring.    Encounter for initial prescription of vaginal ring hormonal contraceptive       Multiple Vitamins-Iron Tabs      Take by mouth daily    Morbid obesity (H), Bariatric surgery status, Other and unspecified postsurgical nonabsorption       * Notice:  This list has 2 medication(s) that are the same as other medications prescribed for you. Read the directions carefully, and ask your doctor or other care provider to review them with you.

## 2017-06-26 NOTE — PATIENT INSTRUCTIONS
Band Post-Adjustment Instructions    After an adjustment, it is very important to change your diet to full liquids for 2-3 days afterwards.  If this consistency goes down well, advance your diet as tolerated back to solids over the next couple of days.    If you are in the red zone.  Please call [phone] and come back to the clinic right away so we can remove some fluid.    Green Zone:  Not hungry  Losing 1-2 lbs a week  Portion Control  Patient satisfaction  Red Zone:  reflux  vomiting  pain when eating, night cough  inability to eat solids  poor weight loss  Yellow Zone:  hungry between meals  not losing weight  eating larger portions

## 2017-08-11 DIAGNOSIS — Z30.015 ENCOUNTER FOR INITIAL PRESCRIPTION OF VAGINAL RING HORMONAL CONTRACEPTIVE: ICD-10-CM

## 2017-08-11 NOTE — TELEPHONE ENCOUNTER
Nuvaring      Last Written Prescription Date: 05/08/17  Last Fill Quantity: 9,  # refills: 3   Last Office Visit with [facility], [facility] or [facility] prescribing provider: 05/22/17

## 2017-08-14 RX ORDER — ETONOGESTREL AND ETHINYL ESTRADIOL VAGINAL RING .015; .12 MG/D; MG/D
RING VAGINAL
Qty: 9 EACH | Refills: 3 | Status: SHIPPED | OUTPATIENT
Start: 2017-08-14 | End: 2018-10-26

## 2017-10-13 DIAGNOSIS — F41.1 GENERALIZED ANXIETY DISORDER: ICD-10-CM

## 2017-10-13 NOTE — TELEPHONE ENCOUNTER
Citalopram 40 mg     Last Written Prescription Date: 5/23/17  Last Fill Quantity: 30, # refills: 3  Last Office Visit with [facility] primary care provider:  5/22/17        Last PHQ-9 score on record=   PHQ-9 SCORE 5/22/2017   Total Score -   Total Score 17

## 2017-10-16 RX ORDER — CITALOPRAM HYDROBROMIDE 40 MG/1
40 TABLET ORAL DAILY
Qty: 30 TABLET | Refills: 3 | Status: SHIPPED | OUTPATIENT
Start: 2017-10-16 | End: 2018-02-27

## 2017-10-24 DIAGNOSIS — B00.1 RECURRENT COLD SORES: ICD-10-CM

## 2017-10-24 RX ORDER — ACYCLOVIR 400 MG/1
400 TABLET ORAL 3 TIMES DAILY
Qty: 30 TABLET | Refills: 1 | Status: SHIPPED | OUTPATIENT
Start: 2017-10-24 | End: 2018-10-31

## 2017-10-24 NOTE — TELEPHONE ENCOUNTER
Acyclovir 400 mg tablets      Last Written Prescription Date:  7/18/16  Last Fill Quantity: 30,   # refills: 1  Future Office visit:       Routing refill request to provider for review/approval because:  Drug not on the [facility], P or [facility] refill protocol or controlled substance

## 2018-02-27 ENCOUNTER — MYC MEDICAL ADVICE (OUTPATIENT)
Dept: FAMILY MEDICINE | Facility: CLINIC | Age: 33
End: 2018-02-27

## 2018-02-27 DIAGNOSIS — F41.1 GENERALIZED ANXIETY DISORDER: ICD-10-CM

## 2018-02-27 RX ORDER — CITALOPRAM HYDROBROMIDE 40 MG/1
TABLET ORAL
Qty: 30 TABLET | Refills: 0 | Status: SHIPPED | OUTPATIENT
Start: 2018-02-27 | End: 2018-03-28

## 2018-02-27 ASSESSMENT — PATIENT HEALTH QUESTIONNAIRE - PHQ9
10. IF YOU CHECKED OFF ANY PROBLEMS, HOW DIFFICULT HAVE THESE PROBLEMS MADE IT FOR YOU TO DO YOUR WORK, TAKE CARE OF THINGS AT HOME, OR GET ALONG WITH OTHER PEOPLE: SOMEWHAT DIFFICULT
SUM OF ALL RESPONSES TO PHQ QUESTIONS 1-9: 5
SUM OF ALL RESPONSES TO PHQ QUESTIONS 1-9: 5

## 2018-02-28 ASSESSMENT — PATIENT HEALTH QUESTIONNAIRE - PHQ9: SUM OF ALL RESPONSES TO PHQ QUESTIONS 1-9: 5

## 2018-03-28 DIAGNOSIS — F41.1 GENERALIZED ANXIETY DISORDER: ICD-10-CM

## 2018-03-28 RX ORDER — CITALOPRAM HYDROBROMIDE 40 MG/1
TABLET ORAL
Qty: 30 TABLET | Refills: 5 | Status: SHIPPED | OUTPATIENT
Start: 2018-03-28 | End: 2018-10-05

## 2018-03-28 NOTE — TELEPHONE ENCOUNTER
"Requested Prescriptions   Pending Prescriptions Disp Refills     citalopram (CELEXA) 40 MG tablet [Pharmacy Med Name: CITALOPRAM 40MG TABLETS] 30 tablet 0     Sig: TAKE 1 TABLET(40 MG) BY MOUTH DAILY    SSRIs Protocol Passed    3/28/2018 10:10 AM       Passed - Recent (12 mo) or future (30 days) visit within the authorizing provider's specialty    Patient had office visit in the last 12 months or has a visit in the next 30 days with authorizing provider or within the authorizing provider's specialty.  See \"Patient Info\" tab in inbasket, or \"Choose Columns\" in Meds & Orders section of the refill encounter.           Passed - Patient is age 18 or older       Passed - No active pregnancy on record       Passed - No positive pregnancy test in last 12 months        PHQ-9 SCORE 7/18/2016 5/22/2017 2/27/2018   Total Score - - -   Total Score MyChart - - 5 (Mild depression)   Total Score 0 17 5     CLARA-7 SCORE 4/11/2016 7/18/2016 5/22/2017   Total Score 10 6 14       Last Written Prescription Date:  2/27/18  Last Fill Quantity: 30,  # refills: 0   Last office visit: 5/22/2017 with prescribing provider:     Future Office Visit:      "

## 2018-04-16 ENCOUNTER — OFFICE VISIT (OUTPATIENT)
Dept: SURGERY | Facility: CLINIC | Age: 33
End: 2018-04-16
Payer: COMMERCIAL

## 2018-04-16 VITALS — BODY MASS INDEX: 31.8 KG/M2 | WEIGHT: 186.25 LBS | HEIGHT: 64 IN

## 2018-04-16 DIAGNOSIS — Z46.51 FITTING AND ADJUSTMENT OF GASTRIC LAP BAND: ICD-10-CM

## 2018-04-16 DIAGNOSIS — Z98.84 BARIATRIC SURGERY STATUS: Primary | ICD-10-CM

## 2018-04-16 DIAGNOSIS — E66.9 OBESITY (BMI 30-39.9): ICD-10-CM

## 2018-04-16 PROCEDURE — S2083 ADJUSTMENT GASTRIC BAND: HCPCS | Performed by: PHYSICIAN ASSISTANT

## 2018-04-16 PROCEDURE — 99207 ZZC NO CHARGE LOS: CPT | Performed by: PHYSICIAN ASSISTANT

## 2018-04-16 NOTE — PROGRESS NOTES
BAND ASSESSMENT VISIT  April 16, 2018    VITALS:          Weight: 186 lb 4 oz (84.5 kg)         Wt change since last visit (lbs): 7.15         Cumulative weight loss (lbs): 26.75    SUBJECTIVE:  Patient comes to the clinic today for band assessment.  In regards to the patient's band, the patient feels they need fluid added to their band.  Her last adjustment was in June of 2017.  She was is the yellow zone after that adjustment.   States she has not been back for an adjustment sooner because every time she made an appointment her children were sick and she had to cancel. She is not satisfied with her weight.      She is not exercising 3x weekly or more.  She is planning on starting volleyball again once Spring/summer finally comes.     Patient is taking the following bariatric postoperative vitamins:  2 Complete multivitamins with minerals (at different times than calcium)      BAND ROS:  Hungry between meals:    Yes  Eating between meals:    Yes Mainly at night (pt works nights)  Eat >1 cup of food at meals:    Yes  Not losing 1-2 lbs a week:    Yes  Not feeling sense of restriction:   Yes    Have pain when swallowing:    No  Have heartburn, vomiting or reflux:   No  Have night cough or hiccups:   No  Making poor food choices:    No  Unable to eat chicken, steak and bread: No    Is pregnant      No  Will be traveling to remote areas   No  Will have surgery soon.   No    ASSESSMENT:    1.  S/P adjustable band surgery  2.  Malnutrition following GI Surgery    PLAN: After evaluation, we have elected to adjust her gastric band. Risk, benefits, and alternatives were reviewed before a consent was signed. Pt wishes to proceed. We reviewed Bariatric Postoperative Vitamins recommendations for the band today.   She will follow up in 1 month for her annual appt and band assessment.       PROCEDURE:  Adjustment of gastric band     PROCEDURE DETAILS: In the clinic exam room, the patient was placed in supine position on the exam  table. The area over the access port was prepped with an alcohol swab, gloves were donned, and a Hurley needle and syringe were directed into the port under palpation guidance. A small amount of saline was aspirated to verify location, and 1 mls was delivered into the port. Access needle was withdrawn and bandaid was applied. Patient sat up and drank 4 ounces of lukewarm water without difficulty. Pt should return to a liquid diet and advance as tolerated. Tight band warning signs were reviewed.  Pt left home in a stable and ambulatory condition.

## 2018-04-16 NOTE — MR AVS SNAPSHOT
After Visit Summary   4/16/2018    Tram Bal    MRN: [id number]           Patient Information     Date Of Birth          1985        Visit Information        Provider Department      4/16/2018 9:00 AM Vanessa Severino PA-C [facility]      Today's Diagnoses     Bariatric surgery status    -  1    Obesity (BMI 30-39.9)          Care Instructions    Band Post-Adjustment Instructions    After an adjustment, it is very important to change your diet to full liquids for 2-3 days afterwards.  If this consistency goes down well, advance your diet as tolerated back to solids over the next couple of days.    If you are in the red zone.  Please call [phone] and come back to the clinic right away so we can remove some fluid.    Green Zone:  Not hungry  Losing 1-2 lbs a week  Portion Control  Patient satisfaction  Red Zone:  reflux  vomiting  pain when eating, night cough  inability to eat solids  poor weight loss  Yellow Zone:  hungry between meals  not losing weight  eating larger portions            Follow-ups after your visit        Who to contact     If you have questions or need follow up information about today's clinic visit or your schedule please contact [facility] directly at [phone].  Normal or non-critical lab and imaging results will be communicated to you by [employer], letter or phone within 4 business days after the clinic has received the results. If you do not hear from us within 7 days, please contact the clinic through [employer] or phone. If you have a critical or abnormal lab result, we will notify you by phone as soon as possible.  Submit refill requests through [employer] or call your pharmacy and they will forward the refill request to us. Please allow 3 business days for your refill to be completed.          Additional Information About Your Visit       "  MyChart Information     [employer] gives you secure access to your electronic health record. If you see a primary care provider, you can also send messages to your care team and make appointments. If you have questions, please call your primary care clinic.  If you do not have a primary care provider, please call [phone] and they will assist you.        Care EveryWhere ID     This is your Care EveryWhere ID. This could be used by other organizations to access your [place] medical records  [id number]        Your Vitals Were     Height BMI (Body Mass Index)                5' 4\" (1.626 m) 31.97 kg/m2           Blood Pressure from Last 3 Encounters:   05/22/17 120/76   04/28/17 110/64   01/10/17 118/79    Weight from Last 3 Encounters:   04/16/18 186 lb 4 oz (84.5 kg)   06/26/17 179 lb 1.6 oz (81.2 kg)   05/22/17 185 lb 12.8 oz (84.3 kg)              Today, you had the following     No orders found for display       Primary Care Provider Office Phone # Fax #    Trudi Wilson IsmaelDEON [phone]       [address] [place] [address]        Equal Access to Services     KELLY FRIED AH: Hadii jovana ku hadasho Soomaali, waaxda luqadaha, qaybta kaalmada adeegyada, ledy terrazas. So [facility] [phone].    ATENCIÓN: Si habla español, tiene a irwin disposición servicios gratuitos de asistencia lingüística. Llame al [phone].    We comply with applicable federal civil rights laws and [place] laws. We do not discriminate on the basis of race, color, national origin, age, disability, sex, sexual orientation, or gender identity.            Thank you!     Thank you for choosing [facility]  for your care. Our goal is always to provide you with excellent care. Hearing back from our patients is one way we can continue to improve our services. Please take a few minutes to complete the written survey that you may receive in the mail after your " visit with us. Thank you!             Your Updated Medication List - Protect others around you: Learn how to safely use, store and throw away your medicines at www.disposemymeds.org.          This list is accurate as of 4/16/18  9:32 AM.  Always use your most recent med list.                   Brand Name Dispense Instructions for use Diagnosis    acyclovir 400 MG tablet    ZOVIRAX    30 tablet    Take 1 tablet (400 mg) by mouth 3 times daily    Recurrent cold sores       * citalopram 20 MG tablet    celeXA    90 tablet    Take 1 tablet (20 mg) by mouth daily    CLARA (generalized anxiety disorder)       * citalopram 40 MG tablet    celeXA    30 tablet    TAKE 1 TABLET(40 MG) BY MOUTH DAILY    Generalized anxiety disorder       etonogestrel-ethinyl estradiol 0.12-0.015 MG/24HR vaginal ring    NUVARING    9 each    Insert 1 ring vaginally every 21 days then remove for 1 week then repeat with new ring.    Encounter for initial prescription of vaginal ring hormonal contraceptive       Multiple Vitamins-Iron Tabs      Take by mouth daily    Morbid obesity (H), Bariatric surgery status, Other and unspecified postsurgical nonabsorption       * Notice:  This list has 2 medication(s) that are the same as other medications prescribed for you. Read the directions carefully, and ask your doctor or other care provider to review them with you.

## 2018-04-16 NOTE — PATIENT INSTRUCTIONS
Band Post-Adjustment Instructions    After an adjustment, it is very important to change your diet to full liquids for 2-3 days afterwards.  If this consistency goes down well, advance your diet as tolerated back to solids over the next couple of days.    If you are in the red zone.  Please call [phone] and come back to the clinic right away so we can remove some fluid.    Green Zone:  Not hungry  Losing 1-2 lbs a week  Portion Control  Patient satisfaction  Red Zone:  reflux  vomiting  pain when eating, night cough  inability to eat solids  poor weight loss  Yellow Zone:  hungry between meals  not losing weight  eating larger portions

## 2018-05-11 ENCOUNTER — OFFICE VISIT (OUTPATIENT)
Dept: SURGERY | Facility: CLINIC | Age: 33
End: 2018-05-11
Payer: COMMERCIAL

## 2018-05-11 VITALS — WEIGHT: 176.5 LBS | BODY MASS INDEX: 30.3 KG/M2 | RESPIRATION RATE: 15 BRPM

## 2018-05-11 DIAGNOSIS — Z46.51 FITTING AND ADJUSTMENT OF GASTRIC LAP BAND: ICD-10-CM

## 2018-05-11 DIAGNOSIS — Z98.84 BARIATRIC SURGERY STATUS: ICD-10-CM

## 2018-05-11 PROCEDURE — S2083 ADJUSTMENT GASTRIC BAND: HCPCS | Performed by: PHYSICIAN ASSISTANT

## 2018-05-11 PROCEDURE — 99207 ZZC NO CHARGE LOS: CPT | Performed by: PHYSICIAN ASSISTANT

## 2018-05-11 NOTE — PROGRESS NOTES
BARIATRIC FOLLOW UP BAND VISIT     May 11, 2018       HISTORY OF PRESENT ILLNESS: Pt returns today for her follow-up appointment status post adjustable gastric band surgery.  Had a fill 3 weeks ago.  Within days of her last fill she started having heartburn mainly at night and first thing in the AM.  She can not eat very much.  Has been sticking to a lot of soup.  Has been using extra pillows at night due to her heartburn.  Would like fluid removed today.     Initial Weight: 215 lb (97.5 kg)   Current Weight: 176 lb 8 oz (80.1 kg)  Cumulative weight loss (lbs): 38.5  Last Visits Weight: 179 lb (81.2 kg)       Patient is taking the following bariatric postoperative vitamins:  1 Complete multivitamins with minerals (at different times than calcium)     Pt is exercising by playing volleyball once per week.    In regards to the patient's band, the patient feels fluid needs to be removed from the band.       SOCIAL HISTORY:  Pt denies smoking.  Pt denies alcohol use.  Avoids NSAIDS.       REVIEW OF SYSTEMS:     GI:  Nausea- Yes  Vomiting- Yes  Heartburn- Yes       BAND ROS:  Hungry between meals:    No  Eating between meals:    No  Eat >1 cup of food at meals:    No  Not losing 1-2 lbs a week:    No  Not feeling sense of restriction:   No    Have pain when swallowing:    No  Have heartburn, vomiting or reflux:   Yes  Have night cough or hiccups:   No  Making poor food choices:    Yes  Unable to eat chicken, steak and bread: Yes    Is pregnant      No  Will be traveling to remote areas   No  Will have surgery soon.   No      LABS/IMAGING/MEDICAL RECORDS REVIEW:       PHYSICAL EXAMINATION:   Resp 15  Wt 176 lb 8 oz (80.1 kg)  BMI 30.3 kg/m2    GENERAL: Alert and oriented x3. NAD      ASSESSMENT AND PLAN:      7 years status post adjustable gastric band.   Obesity - improved Body mass index is 30.3 kg/(m^2)..  Lap Band adjustment    Return to clinic in 4 weeks for annual and discussion regarding another adjustment.  This  will hopefully allow for any inflammation to calm down before adding more fluid back. Informed patient if heartburn continues to contact clinic on Monday.  If she is able to come at that time will not charge to remove additional fluid.       After evaluation, we have elected to adjust the gastric band. Risk, benefits, and alternatives were reviewed before a consent was signed. Pt wishes to proceed.      PROCEDURE: Adjustment of gastric band     PROCEDURE DETAILS: In the clinic exam room, the patient was placed in supine position on the exam table. The area over the access port was prepped with an alcohol swab, gloves were donned, and a Hurley needle and syringe were directed into the port under palpation guidance. A small amount of saline was aspirated to verify location, and 1 ml was removed from the port. Access needle was withdrawn and bandaid was applied. Patient sat up and drank 4 ounces of lukewarm water without difficulty. Pt should return to a liquid diet and advance as tolerated. Tight band warning signs were reviewed.  Pt left home in a stable and ambulatory condition.     I spent a total of 15 minutes face to face with Tram during today's office visit. Over 50% of this time was spent counseling the patient and/or coordinating care.

## 2018-05-11 NOTE — Clinical Note
Hey.  I had to remove some fluid and it appears your note from April and the flowsheet differ in the amount of fluid in band.  FYI

## 2018-05-11 NOTE — MR AVS SNAPSHOT
After Visit Summary   5/11/2018    Tram Bal    MRN: [id number]           Patient Information     Date Of Birth          1985        Visit Information        Provider Department      5/11/2018 9:30 AM Nikita Winter PA-C [facility]      Today's Diagnoses     Bariatric surgery status        Fitting and adjustment of gastric lap band           Follow-ups after your visit        Your next 10 appointments already scheduled     Jun 11, 2018  9:00 AM CDT   PROCEDURE with Vanessa Severino PA-C   [facility] ([facility])    [address]  [facility] [address] [phone]            Jun 11, 2018  9:30 AM CDT   Return Visit with Mariluz Felix Diet 1, RD   [facility] ([facility])    [address] [address] [phone]              Who to contact     If you have questions or need follow up information about today's clinic visit or your schedule please contact [place] [facility] directly at [phone].  Normal or non-critical lab and imaging results will be communicated to you by Re-Composehart, letter or phone within 4 business days after the clinic has received the results. If you do not hear from us within 7 days, please contact the clinic through Re-Composehart or phone. If you have a critical or abnormal lab result, we will notify you by phone as soon as possible.  Submit refill requests through RingCaptcha or call your pharmacy and they will forward the refill request to us. Please allow 3 business days for your refill to be completed.          Additional Information About Your Visit        Re-ComposeharMeet You Information     RingCaptcha gives you secure access to your electronic health record. If you see a primary care provider, you can also  send messages to your care team and make appointments. If you have questions, please call your primary care clinic.  If you do not have a primary care provider, please call [phone] and they will assist you.        Care EveryWhere ID     This is your Care EveryWhere ID. This could be used by other organizations to access your [place] medical records  [id number]        Your Vitals Were     Respirations BMI (Body Mass Index)                15 30.3 kg/m2           Blood Pressure from Last 3 Encounters:   05/22/17 120/76   04/28/17 110/64   01/10/17 118/79    Weight from Last 3 Encounters:   05/11/18 176 lb 8 oz (80.1 kg)   04/16/18 186 lb 4 oz (84.5 kg)   06/26/17 179 lb 1.6 oz (81.2 kg)              We Performed the Following     Lap Band Adjustment - Clinic          Today's Medication Changes          These changes are accurate as of 5/11/18 10:06 AM.  If you have any questions, ask your nurse or doctor.               These medicines have changed or have updated prescriptions.        Dose/Directions    citalopram 40 MG tablet   Commonly known as:  celeXA   This may have changed:  Another medication with the same name was removed. Continue taking this medication, and follow the directions you see here.   Used for:  Generalized anxiety disorder   Changed by:  Nikita Winter PA-C        TAKE 1 TABLET(40 MG) BY MOUTH DAILY   Quantity:  30 tablet   Refills:  5                Primary Care Provider Office Phone # Fax #    Trudi Wilson DEON Guevara [phone]       [address] [place], [place] [address]        Equal Access to Services     KELLY FRIED AH: Hadsherry fortune Sogenevieve, waaxda luqadaha, qaybta kaalmada ledy schaffer. So [facility] [phone].    ATENCIÓN: Si habla español, tiene a irwin disposición servicios gratuitos de asistencia lingüística. Llame al [phone].    We comply with applicable federal civil rights laws and [place] laws. We do  not discriminate on the basis of race, color, national origin, age, disability, sex, sexual orientation, or gender identity.            Thank you!     Thank you for choosing [place] [facility]  for your care. Our goal is always to provide you with excellent care. Hearing back from our patients is one way we can continue to improve our services. Please take a few minutes to complete the written survey that you may receive in the mail after your visit with us. Thank you!             Your Updated Medication List - Protect others around you: Learn how to safely use, store and throw away your medicines at www.disposemymeds.org.          This list is accurate as of 5/11/18 10:06 AM.  Always use your most recent med list.                   Brand Name Dispense Instructions for use Diagnosis    acyclovir 400 MG tablet    ZOVIRAX    30 tablet    Take 1 tablet (400 mg) by mouth 3 times daily    Recurrent cold sores       citalopram 40 MG tablet    celeXA    30 tablet    TAKE 1 TABLET(40 MG) BY MOUTH DAILY    Generalized anxiety disorder       etonogestrel-ethinyl estradiol 0.12-0.015 MG/24HR vaginal ring    NUVARING    9 each    Insert 1 ring vaginally every 21 days then remove for 1 week then repeat with new ring.    Encounter for initial prescription of vaginal ring hormonal contraceptive       Multiple Vitamins-Iron Tabs      Take by mouth daily    Morbid obesity (H), Bariatric surgery status, Other and unspecified postsurgical nonabsorption

## 2018-05-17 ENCOUNTER — TELEPHONE (OUTPATIENT)
Dept: FAMILY MEDICINE | Facility: CLINIC | Age: 33
End: 2018-05-17

## 2018-05-17 NOTE — TELEPHONE ENCOUNTER
Patient was told to return for fasting labs, reminder letter was sent to patient on 05/08/2018, patient has still not scheduled an appointment.

## 2018-07-01 ENCOUNTER — HEALTH MAINTENANCE LETTER (OUTPATIENT)
Age: 33
End: 2018-07-01

## 2018-07-22 ENCOUNTER — HEALTH MAINTENANCE LETTER (OUTPATIENT)
Age: 33
End: 2018-07-22

## 2018-08-07 ENCOUNTER — OFFICE VISIT (OUTPATIENT)
Dept: SURGERY | Facility: CLINIC | Age: 33
End: 2018-08-07
Payer: COMMERCIAL

## 2018-08-07 VITALS
HEIGHT: 64 IN | DIASTOLIC BLOOD PRESSURE: 72 MMHG | WEIGHT: 187.4 LBS | SYSTOLIC BLOOD PRESSURE: 116 MMHG | OXYGEN SATURATION: 95 % | HEART RATE: 79 BPM | RESPIRATION RATE: 12 BRPM | BODY MASS INDEX: 31.99 KG/M2

## 2018-08-07 DIAGNOSIS — Z46.51 FITTING AND ADJUSTMENT OF GASTRIC LAP BAND: ICD-10-CM

## 2018-08-07 DIAGNOSIS — Z98.84 BARIATRIC SURGERY STATUS: Primary | ICD-10-CM

## 2018-08-07 DIAGNOSIS — E66.811 CLASS 1 DRUG-INDUCED OBESITY WITHOUT SERIOUS COMORBIDITY WITH BODY MASS INDEX (BMI) OF 32.0 TO 32.9 IN ADULT: ICD-10-CM

## 2018-08-07 DIAGNOSIS — E66.1 CLASS 1 DRUG-INDUCED OBESITY WITHOUT SERIOUS COMORBIDITY WITH BODY MASS INDEX (BMI) OF 32.0 TO 32.9 IN ADULT: ICD-10-CM

## 2018-08-07 PROCEDURE — S2083 ADJUSTMENT GASTRIC BAND: HCPCS | Performed by: PHYSICIAN ASSISTANT

## 2018-08-07 PROCEDURE — 99207 ZZC NO CHARGE LOS: CPT | Performed by: PHYSICIAN ASSISTANT

## 2018-08-07 NOTE — PROGRESS NOTES
"BAND ASSESSMENT VISIT    August 7, 2018    VITALS:          Weight: 187 lb 6.4 oz (85 kg)         BMI (Calculated): 32.23         Wt change since last visit (lbs): 11.4         Cumulative weight loss (lbs): 27.6    /72 (BP Location: Left arm, Patient Position: Sitting, Cuff Size: Adult Large)  Pulse 79  Resp 12  Ht 5' 4\" (1.626 m)  Wt 187 lb 6.4 oz (85 kg)  SpO2 95%  BMI 32.17 kg/m2    SUBJECTIVE:  Patient comes to the clinic today for band assessment.  She had to cancel her annual appt with me last month.  In regards to the patient's band, the patient feels they need fluid added to their band. She is not satisfied with her weight.  She is try to fit in exercising 3x weekly or more.      BAND ROS:  Hungry between meals:    Yes  Eating between meals:    Yes  Eat >1 cup of food at meals:    Yes  Not losing 1-2 lbs a week:    Yes  Not feeling sense of restriction:   Yes    Have pain when swallowing:    No  Have heartburn, vomiting or reflux:   No  Have night cough or hiccups:   No  Making poor food choices:    No  Unable to eat chicken, steak and bread: No    Is pregnant      No  Will be traveling to remote areas   No  Will have surgery soon.   No    ASSESSMENT:    1.  S/P adjustable band surgery  2.  Malnutrition following GI Surgery    PLAN: After evaluation, we have elected to adjust her gastric band. Risk, benefits, and alternatives were reviewed before a consent was signed. Pt wishes to proceed. Pt will follow up in 1 month for subsequent assessment.  Also, discussed need for annual appt with diet and PA-C.  Pt will schedule this as well.     PROCEDURE:  Adjustment of gastric band     PROCEDURE DETAILS: In the clinic exam room, the patient was placed in supine position on the exam table. The area over the access port was prepped with an alcohol swab, gloves were donned, and a Hurley needle and syringe were directed into the port under palpation guidance. A small amount of saline was aspirated to verify " location, and 0.5 mls was delivered into the port. Access needle was withdrawn and bandaid was applied. Patient sat up and drank 4 ounces of lukewarm water without difficulty. Pt should return to a liquid diet and advance as tolerated. Tight band warning signs were reviewed.  Pt left home in a stable and ambulatory condition.

## 2018-08-07 NOTE — MR AVS SNAPSHOT
After Visit Summary   8/7/2018    Tram Bal    MRN: [id number]           Patient Information     Date Of Birth          1985        Visit Information        Provider Department      8/7/2018 11:30 AM Vanessa Severino PA-C [facility]      Today's Diagnoses     Bariatric surgery status    -  1    Fitting and adjustment of gastric lap band        Class 1 drug-induced obesity without serious comorbidity with body mass index (BMI) of 32.0 to 32.9 in adult           Follow-ups after your visit        Follow-up notes from your care team     Return in 4 weeks (on 9/4/2018) for RTC Band Assessment.      Who to contact     If you have questions or need follow up information about today's clinic visit or your schedule please contact [place] [facility] directly at [phone].  Normal or non-critical lab and imaging results will be communicated to you by VoyageByMehart, letter or phone within 4 business days after the clinic has received the results. If you do not hear from us within 7 days, please contact the clinic through VoyageByMehart or phone. If you have a critical or abnormal lab result, we will notify you by phone as soon as possible.  Submit refill requests through [employer] River Fiber or call your pharmacy and they will forward the refill request to us. Please allow 3 business days for your refill to be completed.          Additional Information About Your Visit        MyChart Information     [employer] Fiber gives you secure access to your electronic health record. If you see a primary care provider, you can also send messages to your care team and make appointments. If you have questions, please call your primary care clinic.  If you do not have a primary care provider, please call [phone] and they will assist you.        Care EveryWhere ID     This is your Care EveryWhere ID. This could be used by other  "organizations to access your [place] medical records  [id number]        Your Vitals Were     Pulse Respirations Height Pulse Oximetry BMI (Body Mass Index)       79 12 5' 4\" (1.626 m) 95% 32.17 kg/m2        Blood Pressure from Last 3 Encounters:   08/07/18 116/72   05/22/17 120/76   04/28/17 110/64    Weight from Last 3 Encounters:   08/07/18 187 lb 6.4 oz (85 kg)   05/11/18 176 lb 8 oz (80.1 kg)   04/16/18 186 lb 4 oz (84.5 kg)              We Performed the Following     Lap Band Adjustment - Clinic        Primary Care Provider Office Phone # Fax #    Trudi Wilson DEON Guevara [phone] [address] [place] [address]        Equal Access to Services     KELLY FRIED : Hadii aad ku hadasho Sogenevieve, waaxda luqadaha, qaybta kaalmada adetinoyada, ledy harrington . So [facility] [phone].    ATENCIÓN: Si habla español, tiene a irwin disposición servicios gratuitos de asistencia lingüística. Hermilo al [phone].    We comply with applicable federal civil rights laws and [place] laws. We do not discriminate on the basis of race, color, national origin, age, disability, sex, sexual orientation, or gender identity.            Thank you!     Thank you for choosing [place] [facility]  for your care. Our goal is always to provide you with excellent care. Hearing back from our patients is one way we can continue to improve our services. Please take a few minutes to complete the written survey that you may receive in the mail after your visit with us. Thank you!             Your Updated Medication List - Protect others around you: Learn how to safely use, store and throw away your medicines at www.disposemymeds.org.          This list is accurate as of 8/7/18 12:08 PM.  Always use your most recent med list.                   Brand Name Dispense Instructions for use Diagnosis    acyclovir 400 MG tablet    ZOVIRAX    30 tablet    Take 1 tablet (400 " mg) by mouth 3 times daily    Recurrent cold sores       citalopram 40 MG tablet    celeXA    30 tablet    TAKE 1 TABLET(40 MG) BY MOUTH DAILY    Generalized anxiety disorder       etonogestrel-ethinyl estradiol 0.12-0.015 MG/24HR vaginal ring    NUVARING    9 each    Insert 1 ring vaginally every 21 days then remove for 1 week then repeat with new ring.    Encounter for initial prescription of vaginal ring hormonal contraceptive       Multiple Vitamins-Iron Tabs      Take by mouth daily    Morbid obesity (H), Bariatric surgery status, Other and unspecified postsurgical nonabsorption

## 2018-10-05 DIAGNOSIS — F41.1 GENERALIZED ANXIETY DISORDER: ICD-10-CM

## 2018-10-05 RX ORDER — CITALOPRAM HYDROBROMIDE 40 MG/1
40 TABLET ORAL DAILY
Qty: 30 TABLET | Refills: 0 | Status: SHIPPED | OUTPATIENT
Start: 2018-10-05 | End: 2018-10-26

## 2018-10-05 NOTE — TELEPHONE ENCOUNTER
"Requested Prescriptions   Pending Prescriptions Disp Refills     citalopram (CELEXA) 40 MG tablet [Pharmacy Med Name: CITALOPRAM 40MG TABLETS] 30 tablet 0     Sig: TAKE 1 TABLET(40 MG) BY MOUTH DAILY    SSRIs Protocol Failed    10/5/2018  2:50 PM       Failed - Recent (12 mo) or future (30 days) visit within the authorizing provider's specialty    Patient had office visit in the last 12 months or has a visit in the next 30 days with authorizing provider or within the authorizing provider's specialty.  See \"Patient Info\" tab in inbasket, or \"Choose Columns\" in Meds & Orders section of the refill encounter.           Passed - Patient is age 18 or older       Passed - No active pregnancy on record       Passed - No positive pregnancy test in last 12 months          "

## 2018-10-26 ENCOUNTER — OFFICE VISIT (OUTPATIENT)
Dept: FAMILY MEDICINE | Facility: CLINIC | Age: 33
End: 2018-10-26
Payer: COMMERCIAL

## 2018-10-26 VITALS
TEMPERATURE: 99 F | SYSTOLIC BLOOD PRESSURE: 112 MMHG | HEIGHT: 64 IN | WEIGHT: 184 LBS | BODY MASS INDEX: 31.41 KG/M2 | RESPIRATION RATE: 18 BRPM | HEART RATE: 76 BPM | DIASTOLIC BLOOD PRESSURE: 74 MMHG

## 2018-10-26 DIAGNOSIS — Z00.00 ENCOUNTER FOR ROUTINE ADULT HEALTH EXAMINATION WITHOUT ABNORMAL FINDINGS: Primary | ICD-10-CM

## 2018-10-26 DIAGNOSIS — F32.0 MILD MAJOR DEPRESSION (H): ICD-10-CM

## 2018-10-26 DIAGNOSIS — E55.9 VITAMIN D DEFICIENCY: ICD-10-CM

## 2018-10-26 DIAGNOSIS — Z30.015 ENCOUNTER FOR INITIAL PRESCRIPTION OF VAGINAL RING HORMONAL CONTRACEPTIVE: ICD-10-CM

## 2018-10-26 DIAGNOSIS — Z01.419 ENCOUNTER FOR GYNECOLOGICAL EXAMINATION WITHOUT ABNORMAL FINDING: ICD-10-CM

## 2018-10-26 DIAGNOSIS — R53.83 FATIGUE, UNSPECIFIED TYPE: ICD-10-CM

## 2018-10-26 DIAGNOSIS — F41.1 GENERALIZED ANXIETY DISORDER: ICD-10-CM

## 2018-10-26 DIAGNOSIS — Z11.3 ROUTINE SCREENING FOR STI (SEXUALLY TRANSMITTED INFECTION): ICD-10-CM

## 2018-10-26 LAB
ANION GAP SERPL CALCULATED.3IONS-SCNC: 10 MMOL/L (ref 3–14)
BUN SERPL-MCNC: 10 MG/DL (ref 7–30)
CALCIUM SERPL-MCNC: 8.4 MG/DL (ref 8.5–10.1)
CHLORIDE SERPL-SCNC: 107 MMOL/L (ref 94–109)
CO2 SERPL-SCNC: 24 MMOL/L (ref 20–32)
CREAT SERPL-MCNC: 0.68 MG/DL (ref 0.52–1.04)
ERYTHROCYTE [DISTWIDTH] IN BLOOD BY AUTOMATED COUNT: 15.2 % (ref 10–15)
GFR SERPL CREATININE-BSD FRML MDRD: >90 ML/MIN/1.7M2
GLUCOSE SERPL-MCNC: 86 MG/DL (ref 70–99)
HCT VFR BLD AUTO: 37.6 % (ref 35–47)
HGB BLD-MCNC: 12.4 G/DL (ref 11.7–15.7)
MCH RBC QN AUTO: 27.8 PG (ref 26.5–33)
MCHC RBC AUTO-ENTMCNC: 33 G/DL (ref 31.5–36.5)
MCV RBC AUTO: 84 FL (ref 78–100)
PLATELET # BLD AUTO: 416 10E9/L (ref 150–450)
POTASSIUM SERPL-SCNC: 4.1 MMOL/L (ref 3.4–5.3)
RBC # BLD AUTO: 4.46 10E12/L (ref 3.8–5.2)
SODIUM SERPL-SCNC: 141 MMOL/L (ref 133–144)
TSH SERPL DL<=0.005 MIU/L-ACNC: 1.2 MU/L (ref 0.4–4)
VIT B12 SERPL-MCNC: 386 PG/ML (ref 193–986)
WBC # BLD AUTO: 9.1 10E9/L (ref 4–11)

## 2018-10-26 PROCEDURE — 84443 ASSAY THYROID STIM HORMONE: CPT | Performed by: NURSE PRACTITIONER

## 2018-10-26 PROCEDURE — 88175 CYTOPATH C/V AUTO FLUID REDO: CPT | Performed by: NURSE PRACTITIONER

## 2018-10-26 PROCEDURE — 99395 PREV VISIT EST AGE 18-39: CPT | Performed by: NURSE PRACTITIONER

## 2018-10-26 PROCEDURE — 99214 OFFICE O/P EST MOD 30 MIN: CPT | Mod: 25 | Performed by: NURSE PRACTITIONER

## 2018-10-26 PROCEDURE — 36415 COLL VENOUS BLD VENIPUNCTURE: CPT | Performed by: NURSE PRACTITIONER

## 2018-10-26 PROCEDURE — 82607 VITAMIN B-12: CPT | Performed by: NURSE PRACTITIONER

## 2018-10-26 PROCEDURE — 87491 CHLMYD TRACH DNA AMP PROBE: CPT | Performed by: NURSE PRACTITIONER

## 2018-10-26 PROCEDURE — 82306 VITAMIN D 25 HYDROXY: CPT | Performed by: NURSE PRACTITIONER

## 2018-10-26 PROCEDURE — 85027 COMPLETE CBC AUTOMATED: CPT | Performed by: NURSE PRACTITIONER

## 2018-10-26 PROCEDURE — 80048 BASIC METABOLIC PNL TOTAL CA: CPT | Performed by: NURSE PRACTITIONER

## 2018-10-26 PROCEDURE — 87591 N.GONORRHOEAE DNA AMP PROB: CPT | Performed by: NURSE PRACTITIONER

## 2018-10-26 PROCEDURE — G0476 HPV COMBO ASSAY CA SCREEN: HCPCS | Performed by: NURSE PRACTITIONER

## 2018-10-26 ASSESSMENT — ENCOUNTER SYMPTOMS
NAUSEA: 0
HEARTBURN: 0
PALPITATIONS: 0
HEMATOCHEZIA: 0
DIARRHEA: 0
ABDOMINAL PAIN: 0
HEADACHES: 0
BREAST MASS: 0
EYE PAIN: 0
DIZZINESS: 0
NERVOUS/ANXIOUS: 0
SHORTNESS OF BREATH: 0
DYSURIA: 0
ARTHRALGIAS: 0
PARESTHESIAS: 0
COUGH: 0
WEAKNESS: 0
MYALGIAS: 0
JOINT SWELLING: 0
CHILLS: 0
CONSTIPATION: 0
SORE THROAT: 0
FREQUENCY: 0
HEMATURIA: 0
FEVER: 0

## 2018-10-26 ASSESSMENT — ANXIETY QUESTIONNAIRES
GAD7 TOTAL SCORE: 1
7. FEELING AFRAID AS IF SOMETHING AWFUL MIGHT HAPPEN: NOT AT ALL
2. NOT BEING ABLE TO STOP OR CONTROL WORRYING: NOT AT ALL
6. BECOMING EASILY ANNOYED OR IRRITABLE: SEVERAL DAYS
1. FEELING NERVOUS, ANXIOUS, OR ON EDGE: NOT AT ALL
3. WORRYING TOO MUCH ABOUT DIFFERENT THINGS: NOT AT ALL
5. BEING SO RESTLESS THAT IT IS HARD TO SIT STILL: NOT AT ALL

## 2018-10-26 ASSESSMENT — PATIENT HEALTH QUESTIONNAIRE - PHQ9
5. POOR APPETITE OR OVEREATING: NOT AT ALL
SUM OF ALL RESPONSES TO PHQ QUESTIONS 1-9: 14
SUM OF ALL RESPONSES TO PHQ QUESTIONS 1-9: 14
10. IF YOU CHECKED OFF ANY PROBLEMS, HOW DIFFICULT HAVE THESE PROBLEMS MADE IT FOR YOU TO DO YOUR WORK, TAKE CARE OF THINGS AT HOME, OR GET ALONG WITH OTHER PEOPLE: SOMEWHAT DIFFICULT

## 2018-10-26 NOTE — PATIENT INSTRUCTIONS
Labs today-we will notify you with those results    Preventive Health Recommendations  Female Ages 26 - 39  Yearly exam:   See your health care provider every year in order to    Review health changes.     Discuss preventive care.      Review your medicines if you your doctor has prescribed any.    Until age 30: Get a Pap test every three years (more often if you have had an abnormal result).    After age 30: Talk to your doctor about whether you should have a Pap test every 3 years or have a Pap test with HPV screening every 5 years.   You do not need a Pap test if your uterus was removed (hysterectomy) and you have not had cancer.  You should be tested each year for STDs (sexually transmitted diseases), if you're at risk.   Talk to your provider about how often to have your cholesterol checked.  If you are at risk for diabetes, you should have a diabetes test (fasting glucose).  Shots: Get a flu shot each year. Get a tetanus shot every 10 years.   Nutrition:     Eat at least 5 servings of fruits and vegetables each day.    Eat whole-grain bread, whole-wheat pasta and brown rice instead of white grains and rice.    Get adequate Calcium and Vitamin D.     Lifestyle    Exercise at least 150 minutes a week (30 minutes a day, 5 days of the week). This will help you control your weight and prevent disease.    Limit alcohol to one drink per day.    No smoking.     Wear sunscreen to prevent skin cancer.    See your dentist every six months for an exam and cleaning.

## 2018-10-26 NOTE — NURSING NOTE
"Chief Complaint   Patient presents with     Physical     Depression       Initial /74 (Cuff Size: Adult Regular)  Pulse 76  Temp 99  F (37.2  C) (Tympanic)  Resp 18  Ht 5' 4\" (1.626 m)  Wt 184 lb (83.5 kg)  LMP 10/17/2018  BMI 31.58 kg/m2 Estimated body mass index is 31.58 kg/(m^2) as calculated from the following:    Height as of this encounter: 5' 4\" (1.626 m).    Weight as of this encounter: 184 lb (83.5 kg).    Patient presents to the clinic using No DME    Health Maintenance that is potentially due pending provider review:  Pap Smear    Possibly completing today per provider review.    Margie Braun, [facility]        "

## 2018-10-26 NOTE — MR AVS SNAPSHOT
After Visit Summary   10/26/2018    Tram Bal    MRN: [id number]           Patient Information     Date Of Birth          1985        Visit Information        Provider Department      10/26/2018 1:40 PM Trudi Guevara APRN [facility]        Today's Diagnoses     Encounter for gynecological examination without abnormal finding    -  1    Generalized anxiety disorder        Encounter for initial prescription of vaginal ring hormonal contraceptive        Fatigue, unspecified type          Care Instructions    Labs today-we will notify you with those results    Preventive Health Recommendations  Female Ages 26 - 39  Yearly exam:   See your health care provider every year in order to    Review health changes.     Discuss preventive care.      Review your medicines if you your doctor has prescribed any.    Until age 30: Get a Pap test every three years (more often if you have had an abnormal result).    After age 30: Talk to your doctor about whether you should have a Pap test every 3 years or have a Pap test with HPV screening every 5 years.   You do not need a Pap test if your uterus was removed (hysterectomy) and you have not had cancer.  You should be tested each year for STDs (sexually transmitted diseases), if you're at risk.   Talk to your provider about how often to have your cholesterol checked.  If you are at risk for diabetes, you should have a diabetes test (fasting glucose).  Shots: Get a flu shot each year. Get a tetanus shot every 10 years.   Nutrition:     Eat at least 5 servings of fruits and vegetables each day.    Eat whole-grain bread, whole-wheat pasta and brown rice instead of white grains and rice.    Get adequate Calcium and Vitamin D.     Lifestyle    Exercise at least 150 minutes a week (30 minutes a day, 5 days of the week). This will help you control your weight and prevent disease.    Limit alcohol to one drink per day.    No smoking.  "    Wear sunscreen to prevent skin cancer.    See your dentist every six months for an exam and cleaning.            Follow-ups after your visit        Who to contact     If you have questions or need follow up information about today's clinic visit or your schedule please contact [facility] directly at [phone].  Normal or non-critical lab and imaging results will be communicated to you by MyChart, letter or phone within 4 business days after the clinic has received the results. If you do not hear from us within 7 days, please contact the clinic through View3hart or phone. If you have a critical or abnormal lab result, we will notify you by phone as soon as possible.  Submit refill requests through Greystripe or call your pharmacy and they will forward the refill request to us. Please allow 3 business days for your refill to be completed.          Additional Information About Your Visit        MyChart Information     Greystripe gives you secure access to your electronic health record. If you see a primary care provider, you can also send messages to your care team and make appointments. If you have questions, please call your primary care clinic.  If you do not have a primary care provider, please call [phone] and they will assist you.        Care EveryWhere ID     This is your Care EveryWhere ID. This could be used by other organizations to access your [place] medical records  [id number]        Your Vitals Were     Pulse Temperature Respirations Height Last Period BMI (Body Mass Index)    76 99  F (37.2  C) (Tympanic) 18 5' 4\" (1.626 m) 10/17/2018 31.58 kg/m2       Blood Pressure from Last 3 Encounters:   10/26/18 112/74   08/07/18 116/72   05/22/17 120/76    Weight from Last 3 Encounters:   10/26/18 184 lb (83.5 kg)   08/07/18 187 lb 6.4 oz (85 kg)   05/11/18 176 lb 8 oz (80.1 kg)              We Performed the Following     Basic metabolic panel     CBC with platelets     HPV High Risk " Types DNA Cervical     Pap imaged thin layer diagnostic with HPV (select HPV order below)     TSH with free T4 reflex     Vitamin B12     Vitamin D Deficiency        Primary Care Provider Office Phone # Fax #    DEON Ochoa [phone] [address] [place] [address]        Equal Access to Services     KELLY FRIED : Hadii aad ku hadasho Soomaali, waaxda luqadaha, qaybta kaalmada adetinoyada, ledy olsonenmanuelconnor terrazas. So [facility] [phone].    ATENCIÓN: Si habla español, tiene a irwin disposición servicios gratuitos de asistencia lingüística. Llame al [phone].    We comply with applicable federal civil rights laws and [place] laws. We do not discriminate on the basis of race, color, national origin, age, disability, sex, sexual orientation, or gender identity.            Thank you!     Thank you for choosing [facility]  for your care. Our goal is always to provide you with excellent care. Hearing back from our patients is one way we can continue to improve our services. Please take a few minutes to complete the written survey that you may receive in the mail after your visit with us. Thank you!             Your Updated Medication List - Protect others around you: Learn how to safely use, store and throw away your medicines at www.disposemymeds.org.          This list is accurate as of 10/26/18  2:30 PM.  Always use your most recent med list.                   Brand Name Dispense Instructions for use Diagnosis    acyclovir 400 MG tablet    ZOVIRAX    30 tablet    Take 1 tablet (400 mg) by mouth 3 times daily    Recurrent cold sores       citalopram 40 MG tablet    celeXA    30 tablet    Take 1 tablet (40 mg) by mouth daily Must have appointment for further refills!    Generalized anxiety disorder       etonogestrel-ethinyl estradiol 0.12-0.015 MG/24HR vaginal ring    NUVARING    9 each    Insert 1 ring vaginally every 21 days then remove  for 1 week then repeat with new ring.    Encounter for initial prescription of vaginal ring hormonal contraceptive       Multiple Vitamins-Iron Tabs      Take by mouth daily    Morbid obesity (H), Bariatric surgery status, Other and unspecified postsurgical nonabsorption

## 2018-10-26 NOTE — PROGRESS NOTES
SUBJECTIVE:   CC: Tram Bal is an 33 year old woman who presents for preventive health visit.     Physical   Annual:     Getting at least 3 servings of Calcium per day:  NO    Bi-annual eye exam:  NO    Dental care twice a year:  Yes    Sleep apnea or symptoms of sleep apnea:  None    Diet:  Regular (no restrictions), Vegetarian/vegan and Gluten-free/reduced    Frequency of exercise:  None    Taking medications regularly:  Yes    Medication side effects:  Other    Additional concerns today:  YES    Depression Followup    Status since last visit: Stable     See PHQ-9 for current symptoms.  Other associated symptoms: Feels like sleeping a lot     Complicating factors:   Significant life event:  No   Current substance abuse:  None  Anxiety or Panic symptoms:  No    Fatigue is a major contributor to symptoms.  No thoughts of suicide or self harm    PHQ 5/22/2017 2/27/2018 10/26/2018   PHQ-9 Total Score 17 5 14   Q9: Suicide Ideation Not at all Not at all Not at all     CLARA-7 SCORE 4/11/2016 7/18/2016 5/22/2017   Total Score 10 6 14       PHQ-9  English  PHQ-9   Any Language  Suicide Assessment Five-step Evaluation and Treatment (SAFE-T)    Today's PHQ-2 Score:   PHQ-2 ( 1999 [employer]) 10/26/2018   Q1: Little interest or pleasure in doing things 3   Q2: Feeling down, depressed or hopeless 1   PHQ-2 Score 4   Q1: Little interest or pleasure in doing things Nearly every day   Q2: Feeling down, depressed or hopeless Several days   PHQ-2 Score 4       Abuse: Current or Past(Physical, Sexual or Emotional)- No  Do you feel safe in your environment - Yes    Social History   Substance Use Topics     Smoking status: Former Smoker     Packs/day: 0.50     Years: 3.00     Quit date: 5/1/2010     Smokeless tobacco: Never Used      Comment: quit smoking may 2010      Alcohol use Yes      Comment: occasionally     Alcohol Use 10/26/2018   If you drink alcohol do you typically have greater than 3 drinks per day OR greater than 7  "drinks per week? No       Reviewed orders with patient.  Reviewed health maintenance and updated orders accordingly - Yes  Labs reviewed in EPIC    Mammogram not appropriate for this patient based on age.    Pertinent mammograms are reviewed under the imaging tab.  History of abnormal Pap smear: YES - other categories - see link Cervical Cytology Screening Guidelines  PAP / HPV Latest Ref Rng & Units 5/22/2017 4/28/2017 4/25/2016   PAP - ASC-US(A) ASC-US(A) ASC-US(A)   HPV 16 DNA NEG Negative Negative Negative   HPV 18 DNA NEG Negative Negative Negative   OTHER HR HPV NEG Positive(A) Positive(A) Negative     Reviewed and updated as needed this visit by clinical staff  Tobacco  Allergies  Meds  Med Hx  Surg Hx  Fam Hx  Soc Hx        Reviewed and updated as needed this visit by Provider            Review of Systems   Constitutional: Negative for chills and fever.   HENT: Negative for congestion, ear pain, hearing loss and sore throat.    Eyes: Negative for pain and visual disturbance.   Respiratory: Negative for cough and shortness of breath.    Cardiovascular: Negative for chest pain, palpitations and peripheral edema.   Gastrointestinal: Negative for abdominal pain, constipation, diarrhea, heartburn, hematochezia and nausea.   Breasts:  Negative for tenderness, breast mass and discharge.   Genitourinary: Negative for dysuria, frequency, genital sores, hematuria, pelvic pain, urgency, vaginal bleeding and vaginal discharge.   Musculoskeletal: Negative for arthralgias, joint swelling and myalgias.   Skin: Negative for rash.   Neurological: Negative for dizziness, weakness, headaches and paresthesias.   Psychiatric/Behavioral: Negative for mood changes. The patient is not nervous/anxious.         OBJECTIVE:   /74 (Cuff Size: Adult Regular)  Pulse 76  Temp 99  F (37.2  C) (Tympanic)  Resp 18  Ht 5' 4\" (1.626 m)  Wt 184 lb (83.5 kg)  LMP 10/17/2018  BMI 31.58 kg/m2  Physical Exam  GENERAL: healthy, " alert and no distress  EYES: Eyes grossly normal to inspection, PERRL and conjunctivae and sclerae normal  HENT: ear canals and TM's normal, nose and mouth without ulcers or lesions  NECK: no adenopathy, no asymmetry, masses, or scars and thyroid normal to palpation  RESP: lungs clear to auscultation - no rales, rhonchi or wheezes  BREAST: normal without masses, tenderness or nipple discharge and no palpable axillary masses or adenopathy  CV: regular rate and rhythm, normal S1 S2, no S3 or S4, no murmur, click or rub, no peripheral edema and peripheral pulses strong  ABDOMEN: soft, nontender, no hepatosplenomegaly, no masses and bowel sounds normal   (female): normal female external genitalia, normal urethral meatus, vaginal mucosa pink, moist, well rugated, and normal cervix/adnexa/uterus without masses or discharge  MS: no gross musculoskeletal defects noted, no edema  SKIN: no suspicious lesions or rashes  NEURO: Normal strength and tone, mentation intact and speech normal  PSYCH: mentation appears normal, affect normal/bright    Diagnostic Test Results:  Results for orders placed or performed in visit on 10/26/18   CBC with platelets   Result Value Ref Range    WBC 9.1 4.0 - 11.0 10e9/L    RBC Count 4.46 3.8 - 5.2 10e12/L    Hemoglobin 12.4 11.7 - 15.7 g/dL    Hematocrit 37.6 35.0 - 47.0 %    MCV 84 78 - 100 fl    MCH 27.8 26.5 - 33.0 pg    MCHC 33.0 31.5 - 36.5 g/dL    RDW 15.2 (H) 10.0 - 15.0 %    Platelet Count 416 150 - 450 10e9/L   TSH with free T4 reflex   Result Value Ref Range    TSH 1.20 0.40 - 4.00 mU/L   Vitamin D Deficiency   Result Value Ref Range    Vitamin D Deficiency screening 17 (L) 20 - 75 ug/L   Vitamin B12   Result Value Ref Range    Vitamin B12 386 193 - 986 pg/mL   Basic metabolic panel   Result Value Ref Range    Sodium 141 133 - 144 mmol/L    Potassium 4.1 3.4 - 5.3 mmol/L    Chloride 107 94 - 109 mmol/L    Carbon Dioxide 24 20 - 32 mmol/L    Anion Gap 10 3 - 14 mmol/L    Glucose 86 70  - 99 mg/dL    Urea Nitrogen 10 7 - 30 mg/dL    Creatinine 0.68 0.52 - 1.04 mg/dL    GFR Estimate >90 >60 mL/min/1.7m2    GFR Estimate If Black >90 >60 mL/min/1.7m2    Calcium 8.4 (L) 8.5 - 10.1 mg/dL   Chlamydia trachomatis PCR   Result Value Ref Range    Specimen Description Cervix     Chlamydia Trachomatis PCR Negative NEG^Negative   Neisseria gonorrhoeae PCR   Result Value Ref Range    Specimen Descrip Cervix     N Gonorrhea PCR Negative NEG^Negative       ASSESSMENT/PLAN:   1. Encounter for routine adult health examination without abnormal findings      2. Encounter for gynecological examination without abnormal finding    - Pap imaged thin layer diagnostic with HPV (select HPV order below)  - HPV High Risk Types DNA Cervical    3. Mild major depression (H)  Not controlled.  Vitamin D deficiency on labs likely contributing to fatigue, will replace and recheck in 2 months. Consider adding Wellbutrin for depression symptoms if not improving.   - citalopram (CELEXA) 40 MG tablet; Take 1 tablet (40 mg) by mouth daily  Dispense: 30 tablet; Refill: 3    4. Generalized anxiety disorder  Not controlled.  Vitamin D deficiency on labs likely contributing to fatigue, will replace and recheck in 2 months. Consider adding Wellbutrin for depression symptoms if not improving.   - citalopram (CELEXA) 40 MG tablet; Take 1 tablet (40 mg) by mouth daily  Dispense: 30 tablet; Refill: 3    5. Encounter for initial prescription of vaginal ring hormonal contraceptive  Risks and benefits of contraceptives discussed including but not limited to increased risk of thrombolytic events including blood clot and stroke and certain female cancers.  Tram verbalized understanding of risks.  - etonogestrel-ethinyl estradiol (NUVARING) 0.12-0.015 MG/24HR vaginal ring; Insert 1 ring vaginally every 21 days then remove for 1 week then repeat with new ring.  Dispense: 9 each; Refill: 3    6. Vitamin D deficiency  Will replace with once weekly  "treatment then 2,000 international unit(s) of vitamin D3 daily.  Recheck in 2-3 months.   - cholecalciferol (VITAMIN D3) 65606 units capsule; Take 1 capsule (50,000 Units) by mouth once a week  Dispense: 8 capsule; Refill: 0    7. Fatigue, unspecified type  Per above.   - CBC with platelets  - TSH with free T4 reflex  - Vitamin D Deficiency  - Vitamin B12  - Basic metabolic panel    8. Routine screening for STI (sexually transmitted infection)    - Chlamydia trachomatis PCR  - Neisseria gonorrhoeae PCR    Home care instructions were reviewed with the patient. The risks, benefits and treatment options of prescribed medications or other treatments have been discussed with the patient. The patient verbalized their understanding and should call or follow up if no improvement or if they develop further problems.    COUNSELING:  Reviewed preventive health counseling, as reflected in patient instructions    BP Readings from Last 1 Encounters:   10/26/18 112/74     Estimated body mass index is 31.58 kg/(m^2) as calculated from the following:    Height as of this encounter: 5' 4\" (1.626 m).    Weight as of this encounter: 184 lb (83.5 kg).     reports that she quit smoking about 8 years ago. She has a 1.50 pack-year smoking history. She has never used smokeless tobacco.      Counseling Resources:  ATP IV Guidelines  Pooled Cohorts Equation Calculator  Breast Cancer Risk Calculator  FRAX Risk Assessment  ICSI Preventive Guidelines  Dietary Guidelines for Americans, 2010  USDA's MyPlate  ASA Prophylaxis  Lung CA Screening    [facility]  Answers for HPI/ROS submitted by the patient on 10/26/2018   PHQ-2 Score: 4  If you checked off any problems, how difficult have these problems made it for you to do your work, take care of things at home, or get along with other people?: Somewhat difficult  PHQ9 TOTAL SCORE: 14    "

## 2018-10-27 LAB — DEPRECATED CALCIDIOL+CALCIFEROL SERPL-MC: 17 UG/L (ref 20–75)

## 2018-10-27 ASSESSMENT — PATIENT HEALTH QUESTIONNAIRE - PHQ9: SUM OF ALL RESPONSES TO PHQ QUESTIONS 1-9: 14

## 2018-10-27 ASSESSMENT — ANXIETY QUESTIONNAIRES: GAD7 TOTAL SCORE: 1

## 2018-10-28 PROBLEM — E55.9 VITAMIN D DEFICIENCY: Status: ACTIVE | Noted: 2018-10-28

## 2018-10-28 PROBLEM — F32.0 MILD MAJOR DEPRESSION (H): Status: ACTIVE | Noted: 2018-10-28

## 2018-10-28 LAB
C TRACH DNA SPEC QL NAA+PROBE: NEGATIVE
N GONORRHOEA DNA SPEC QL NAA+PROBE: NEGATIVE
SPECIMEN SOURCE: NORMAL
SPECIMEN SOURCE: NORMAL

## 2018-10-28 RX ORDER — ETONOGESTREL AND ETHINYL ESTRADIOL VAGINAL RING .015; .12 MG/D; MG/D
RING VAGINAL
Qty: 9 EACH | Refills: 3 | Status: SHIPPED | OUTPATIENT
Start: 2018-10-28 | End: 2019-11-18

## 2018-10-28 RX ORDER — CITALOPRAM HYDROBROMIDE 40 MG/1
40 TABLET ORAL DAILY
Qty: 30 TABLET | Refills: 3 | Status: SHIPPED | OUTPATIENT
Start: 2018-10-28 | End: 2019-03-05

## 2018-10-30 LAB
COPATH REPORT: NORMAL
PAP: NORMAL

## 2018-10-31 DIAGNOSIS — B00.1 RECURRENT COLD SORES: ICD-10-CM

## 2018-10-31 RX ORDER — ACYCLOVIR 400 MG/1
TABLET ORAL
Qty: 30 TABLET | Refills: 0 | Status: SHIPPED | OUTPATIENT
Start: 2018-10-31 | End: 2022-04-20

## 2018-10-31 NOTE — TELEPHONE ENCOUNTER
"Requested Prescriptions   Pending Prescriptions Disp Refills     acyclovir (ZOVIRAX) 400 MG tablet [Pharmacy Med Name: ACYCLOVIR 400MG TABLETS] 30 tablet 0     Sig: TAKE 1 TABLET(400 MG) BY MOUTH THREE TIMES DAILY    Antivirals for Herpes Protocol Passed    10/31/2018  8:16 AM       Passed - Patient is age 12 or older       Passed - Recent (12 mo) or future (30 days) visit within the authorizing provider's specialty    Patient had office visit in the last 12 months or has a visit in the next 30 days with authorizing provider or within the authorizing provider's specialty.  See \"Patient Info\" tab in inbasket, or \"Choose Columns\" in Meds & Orders section of the refill encounter.             Passed - Normal serum creatinine on file in past 12 months    Recent Labs   Lab Test  10/26/18   1444   CR  0.68             Last Written Prescription Date:  108/24/17  Last Fill Quantity: 30,  # refills: 1   Last office visit: 10/26/2018 with prescribing provider:     Future Office Visit:      "

## 2018-11-01 DIAGNOSIS — Z30.015 ENCOUNTER FOR INITIAL PRESCRIPTION OF VAGINAL RING HORMONAL CONTRACEPTIVE: ICD-10-CM

## 2018-11-01 LAB
FINAL DIAGNOSIS: ABNORMAL
HPV HR 12 DNA CVX QL NAA+PROBE: POSITIVE
HPV16 DNA SPEC QL NAA+PROBE: NEGATIVE
HPV18 DNA SPEC QL NAA+PROBE: NEGATIVE
SPECIMEN DESCRIPTION: ABNORMAL
SPECIMEN SOURCE CVX/VAG CYTO: ABNORMAL

## 2018-11-01 RX ORDER — ETONOGESTREL/ETHINYL ESTRADIOL .12-.015MG
RING, VAGINAL VAGINAL
Refills: 0 | OUTPATIENT
Start: 2018-11-01

## 2018-11-01 NOTE — TELEPHONE ENCOUNTER
"Requested Prescriptions   Pending Prescriptions Disp Refills     NUVARING 0.12-0.015 MG/24HR vaginal ring [Pharmacy Med Name: NUVARING]  0     Sig: INSERT 1 RING VAGINALLY EVERY 21 DAYS THEN REMOVE FOR 1 WEEK THEN REPEAT WITH NEW RING    Contraceptives Protocol Passed    11/1/2018  3:29 AM       Passed - Patient is not a current smoker if age is 35 or older       Passed - Recent (12 mo) or future (30 days) visit within the authorizing provider's specialty    Patient had office visit in the last 12 months or has a visit in the next 30 days with authorizing provider or within the authorizing provider's specialty.  See \"Patient Info\" tab in inbasket, or \"Choose Columns\" in Meds & Orders section of the refill encounter.             Passed - No active pregnancy on record       Passed - No positive pregnancy test in past 12 months        Last Written Prescription Date:  10/28/18  Last Fill Quantity: 9,  # refills: 3   Last office visit: 10/26/2018 with prescribing provider:     Future Office Visit:      "

## 2018-11-01 NOTE — TELEPHONE ENCOUNTER
Duplicate  Medication Detail      Disp Refills Start End CARLEE   etonogestrel-ethinyl estradiol (NUVARING) 0.12-0.015 MG/24HR vaginal ring 9 each 3 10/28/2018  No   Sig: Insert 1 ring vaginally every 21 days then remove for 1 week then repeat with new ring.   Class: E-Prescribe   Order: [id number]   E-Prescribing Status: Receipt confirmed by pharmacy (10/28/2018  4:34 PM CDT)     Margareth LEES RN

## 2018-11-20 ENCOUNTER — MYC MEDICAL ADVICE (OUTPATIENT)
Dept: FAMILY MEDICINE | Facility: CLINIC | Age: 33
End: 2018-11-20

## 2018-11-20 DIAGNOSIS — F32.0 MILD MAJOR DEPRESSION (H): Primary | ICD-10-CM

## 2018-11-21 RX ORDER — BUPROPION HYDROCHLORIDE 150 MG/1
150 TABLET ORAL EVERY MORNING
Qty: 30 TABLET | Refills: 1 | Status: SHIPPED | OUTPATIENT
Start: 2018-11-21 | End: 2019-01-16

## 2019-01-16 DIAGNOSIS — F32.0 MILD MAJOR DEPRESSION (H): ICD-10-CM

## 2019-01-16 RX ORDER — BUPROPION HYDROCHLORIDE 150 MG/1
TABLET ORAL
Qty: 30 TABLET | Refills: 0 | Status: SHIPPED | OUTPATIENT
Start: 2019-01-16 | End: 2019-02-18

## 2019-01-16 NOTE — TELEPHONE ENCOUNTER
"Requested Prescriptions   Pending Prescriptions Disp Refills     buPROPion (WELLBUTRIN XL) 150 MG 24 hr tablet [Pharmacy Med Name: BUPROPION XL 150MG TABLETS (24 H)] 30 tablet 0     Sig: TAKE 1 TABLET(150 MG) BY MOUTH EVERY MORNING    SSRIs Protocol Failed - 1/16/2019 10:11 AM       Failed - PHQ-9 score less than 5 in past 6 months    Please review last PHQ-9 score.          Passed - Medication is Bupropion    If the medication is Bupropion (Wellbutrin), and the patient is taking for smoking cessation; OK to refill.         Passed - Medication is active on med list       Passed - Patient is age 18 or older       Passed - No active pregnancy on record       Passed - No positive pregnancy test in last 12 months       Passed - Recent (6 mo) or future (30 days) visit within the authorizing provider's specialty    Patient had office visit in the last 6 months or has a visit in the next 30 days with authorizing provider or within the authorizing provider's specialty.  See \"Patient Info\" tab in inbasket, or \"Choose Columns\" in Meds & Orders section of the refill encounter.      Last Written Prescription Date:  11/21/18  Last Fill Quantity: 90,  # refills: 1   Last office visit: 10/26/2018 with prescribing provider:     Future Office Visit:   Next 5 appointments (look out 90 days)    Feb 01, 2019  9:00 AM CST  Office Visit with Batool Liang MD, NB PROC RM 1  [facility] ([facility]) [address] [place] [id number] [phone]                     "

## 2019-02-18 DIAGNOSIS — F32.0 MILD MAJOR DEPRESSION (H): ICD-10-CM

## 2019-02-18 NOTE — TELEPHONE ENCOUNTER
"Requested Prescriptions   Pending Prescriptions Disp Refills     buPROPion (WELLBUTRIN XL) 150 MG 24 hr tablet [Pharmacy Med Name: BUPROPION XL 150MG TABLETS (24 H)] 30 tablet 0     Sig: TAKE 1 TABLET(150 MG) BY MOUTH EVERY MORNING    SSRIs Protocol Failed - 2/18/2019  5:08 PM       Failed - PHQ-9 score less than 5 in past 6 months    Please review last PHQ-9 score.          Passed - Medication is Bupropion    If the medication is Bupropion (Wellbutrin), and the patient is taking for smoking cessation; OK to refill.         Passed - Medication is active on med list       Passed - Patient is age 18 or older       Passed - No active pregnancy on record       Passed - No positive pregnancy test in last 12 months       Passed - Recent (6 mo) or future (30 days) visit within the authorizing provider's specialty    Patient had office visit in the last 6 months or has a visit in the next 30 days with authorizing provider or within the authorizing provider's specialty.  See \"Patient Info\" tab in inbasket, or \"Choose Columns\" in Meds & Orders section of the refill encounter.            Last Written Prescription Date:  1/16/2019  Last Fill Quantity: 30,  # refills: 0   Last office visit: 10/26/2018 with prescribing provider:  10/26/2018   Future Office Visit:      "

## 2019-02-19 RX ORDER — BUPROPION HYDROCHLORIDE 150 MG/1
TABLET ORAL
Qty: 30 TABLET | Refills: 2 | Status: SHIPPED | OUTPATIENT
Start: 2019-02-19 | End: 2019-03-18

## 2019-03-05 DIAGNOSIS — F32.0 MILD MAJOR DEPRESSION (H): ICD-10-CM

## 2019-03-05 DIAGNOSIS — F41.1 GENERALIZED ANXIETY DISORDER: ICD-10-CM

## 2019-03-05 RX ORDER — CITALOPRAM HYDROBROMIDE 40 MG/1
TABLET ORAL
Qty: 30 TABLET | Refills: 0 | Status: SHIPPED | OUTPATIENT
Start: 2019-03-05 | End: 2019-03-18

## 2019-03-06 ENCOUNTER — VIRTUAL VISIT (OUTPATIENT)
Dept: FAMILY MEDICINE | Facility: OTHER | Age: 34
End: 2019-03-06

## 2019-03-06 NOTE — PROGRESS NOTES
"Date:   Clinician: Rachel Tomlin  Clinician NPI: [id number]  Patient: Tram Bal  Patient : 1985  Patient Address: [address] [place] [address]  Patient Phone: [phone]  Visit Protocol: URI  Patient Summary:  Tram is a 33 year old ( : 1985 ) female who initiated a Visit for cold, sinus infection, or influenza. When asked the question \"Please sign me up to receive news, health information and promotions. \", Tram responded \"No\".    Tram states her symptoms started gradually 2-3 weeks ago. After her symptoms started, they improved and then got worse again.   Her symptoms consist of malaise, facial pain or pressure, a cough, rhinitis, nasal congestion, a headache, and ear pain. She is experiencing difficulty breathing due to nasal congestion but she is not short of breath.   Symptom details     Nasal secretions: The color of her mucus is yellow.    Cough: Tram coughs every 5-10 minutes and her cough is more bothersome at night. Phlegm comes into her throat when she coughs. She believes the phlegm causes the cough. The color of the phlegm is yellow.     Facial pain or pressure: The facial pain or pressure feels worse when bending over or leaning forward.     Headache: She states the headache is mild (1-3 on a 10 point pain scale).      Tram denies having fever, chills, wheezing, teeth pain, myalgias, enlarged lymph nodes, and sore throat. She also denies having a sinus infection within the past year, taking antibiotic medication for the symptoms, and having recent facial or sinus surgery in the past 60 days.   She has not recently been exposed to someone with influenza. Tram has not been in close contact with any high risk individuals.   Weight: 180 lbs   Tram does not smoke or use smokeless tobacco.   She denies pregnancy and denies breastfeeding. She has menstruated in the past month.   MEDICATIONS: citalopram oral, ALLERGIES: Famvir  Clinician " Response:  Dear Tram,  Based on the information provided, you have acute bacterial sinusitis, also known as a sinus infection. Sinus infections are caused by bacteria or a virus and symptoms are almost always identical. The difference between the 2 types of infections is timing.  Sinus infections start as viral infections and symptoms improve on their own in about 7 days. If symptoms have not improved after 7 days or have even worsened, a bacterial infection may have developed.  Medication information  I am prescribing:       Fluticasone 50 mcg/actuation nasal spray. Inhale 2 sprays in each nostril 1 time per day; after 1 week, may adjust to 1 - 2 sprays in each nostril 1 time per day. This medication takes several days to start working, so keep taking it even if it doesn't help right away. There are no refills with this prescription.      Amoxicillin-pot clavulanate 875-125 mg oral tablet. Take 1 tablet by mouth every 12 hours for 10 days. There are no refills with this prescription.     Antibiotics can cause an allergic reaction even if you have taken them without a problem in the past. If you develop a new rash, swelling, or difficulty breathing, stop the medication and be seen in a clinic or urgent care immediately.  A yeast infection is a side effect of taking antibiotics in some women. Please use [facility] to get treatment if you have symptoms of a yeast infection.  If you become pregnant during this course of treatment, stop taking the medication and contact your primary care provider.  Unless you are allergic to the over-the-counter medication(s) below, I recommend using:   A decongestant such as Sudafed PE or store brand.   Over-the-counter medications do not require a prescription. Ask the pharmacist if you have any questions.  Self care  The following tips will keep you as comfortable as possible while you recover:     Rest    Drink plenty of water and other liquids    Take a hot shower to loosen  congestion    Take a spoonful of honey to reduce your cough     When to seek care  Please be seen in a clinic or urgent care if any of the following occur:     Symptoms do not start to improve after 3 days of treatment    New symptoms develop, or symptoms become worse      Diagnosis: Acute bacterial sinusitis  Diagnosis ICD: J01.90  Prescription: fluticasone 50 mcg/actuation nasal spray,suspension 1 120 spray aerosol with adapter (grams), 30 days supply. Inhale 2 sprays in each nostril 1 time per day; after 1 week, may adjust to 1 - 2 sprays in each nostril 1 time per day.. Refills: 0, Refill as needed: no, Allow substitutions: yes  Prescription: amoxicillin-pot clavulanate 875-125 mg oral tablet 20 tablet, 10 days supply. Take 1 tablet by mouth every 12 hours for 10 days. Refills: 0, Refill as needed: no, Allow substitutions: yes  Pharmacy: [id number] - [phone] - [address] [place] [address]

## 2019-03-15 RX ORDER — CITALOPRAM HYDROBROMIDE 40 MG/1
40 TABLET ORAL DAILY
Qty: 30 TABLET | Refills: 0 | Status: CANCELLED | OUTPATIENT
Start: 2019-03-15

## 2019-03-15 RX ORDER — BUPROPION HYDROCHLORIDE 150 MG/1
TABLET ORAL
Qty: 30 TABLET | Refills: 2 | Status: CANCELLED | OUTPATIENT
Start: 2019-03-15

## 2019-03-15 NOTE — PROGRESS NOTES
SUBJECTIVE:   Tram Bal is a 33 year old female who presents to clinic today for the following health issues:    Depression and Anxiety Follow-Up    Status since last visit: No change would like to discontinue both  medications     Other associated symptoms:None    Complicating factors:     Significant life event: No     Current substance abuse: None    Feeling good.  Is not sure that medications are helping and would like to go off.  States that she did not notice much from the Wellbutrin and that temperature changes seem to be helping her moods more than anything.    PHQ 10/26/2018 1/16/2019 3/18/2019   PHQ-9 Total Score 14 10 4   Q9: Suicide Ideation Not at all Not at all Not at all     CLARA-7 SCORE 10/26/2018 1/16/2019 3/18/2019   Total Score - 1 (minimal anxiety) 2 (minimal anxiety)   Total Score 1 1 2       PHQ-9  English  PHQ-9   Any Language  CLARA-7  Suicide Assessment Five-step Evaluation and Treatment (SAFE-T)    Amount of exercise or physical activity: not much right now     Problems taking medications regularly: No    Medication side effects: none    Diet: regular (no restrictions)    Derm  2 cysts on scalp  Has had removed by the general surgery in the past   Would like new referral to have new cysts removed    Problem list and histories reviewed & adjusted, as indicated.  Additional history: as documented    Patient Active Problem List   Diagnosis     Screening examination for pulmonary tuberculosis     Cervical high risk HPV (human papillomavirus) test positive     Encounter for supervision of other normal pregnancy     Carrier or suspected carrier of group B Streptococcus     Displacement of lumbar intervertebral disc     CARDIOVASCULAR SCREENING; LDL GOAL LESS THAN 160     LAP-BAND surgery status     Tobacco abuse, in remission     Routine general medical examination at a health care facility     Encounter for routine gynecological examination     Encounter for other general counseling or  advice on contraception     Generalized anxiety disorder     Bariatric surgery status     Overweight (BMI 25.0-29.9)     Class 1 drug-induced obesity without serious comorbidity with body mass index (BMI) 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.0 to 32.9 in adult     Vitamin D deficiency     Mild major depression (H)     Past Surgical History:   Procedure Laterality Date     C NONSPECIFIC PROCEDURE      Oral surgery     COLPOSCOPY,LOOP ELECTRD CERVIX EXCIS  2005    JARETH III     GI SURGERY  11    LAP BANDING for obesity, pre-DM       Social History     Tobacco Use     Smoking status: Former Smoker     Packs/day: 0.50     Years: 3.00     Pack years: 1.50     Last attempt to quit: 2010     Years since quittin.8     Smokeless tobacco: Never Used     Tobacco comment: quit smoking may 2010    Substance Use Topics     Alcohol use: Yes     Comment: occasionally     Family History   Problem Relation Age of Onset     Thyroid Disease Mother      Unknown/Adopted Mother      Hyperlipidemia Father      Hypertension Paternal Grandmother      Hyperlipidemia Paternal Grandmother      Diabetes Paternal Aunt      Hypertension Paternal Aunt      Lipids Paternal Aunt      Thyroid Disease Maternal Half-Brother      Unknown/Adopted No family hx of         unaware of maternal grandparents history as pt mom was adopted     Breast Cancer No family hx of      Cancer - colorectal No family hx of      Gynecology No family hx of         no ovarian cancer     Coronary Artery Disease No family hx of      Cerebrovascular Disease No family hx of      Thyroid Disease No family hx of      Osteoporosis No family hx of          Current Outpatient Medications   Medication Sig Dispense Refill     etonogestrel-ethinyl estradiol (NUVARING) 0.12-0.015 MG/24HR vaginal ring Insert 1 ring vaginally every 21 days then remove for 1 week then repeat with new ring. 9 each 3     Multiple Vitamins-Iron TABS Take by mouth daily        acyclovir (ZOVIRAX) 400 MG tablet TAKE 1  "TABLET(400 MG) BY MOUTH THREE TIMES DAILY 30 tablet 0     Allergies   Allergen Reactions     Famvir [Famciclovir] Hives     Labs reviewed in EPIC    Reviewed and updated as needed this visit by clinical staff  Tobacco  Allergies  Meds  Med Hx  Surg Hx  Fam Hx  Soc Hx      Reviewed and updated as needed this visit by Provider         ROS:  Constitutional, HEENT, cardiovascular, pulmonary, gi and gu systems are negative, except as otherwise noted.    OBJECTIVE:     /82   Pulse 76   Temp 99  F (37.2  C)   Resp 14   Ht 1.626 m (5' 4\")   Wt 83.5 kg (184 lb)   BMI 31.58 kg/m    Body mass index is 31.58 kg/m .   GENERAL: healthy, alert and no distress  SKIN: 2 firm moveable cysts scalp  PSYCH: mentation appears normal, affect normal/bright    Diagnostic Test Results:  none     ASSESSMENT/PLAN:     1. Mild major depression (H)  Controlled.  Would like to taper off the Celexa and Wellbutrin.   Taper information given.  Follow up with any worsening or ongoing symptoms, stop the taper at any time and let me know.    2. Generalized anxiety disorder  Controlled, per above    3. Dermoid cyst of scalp  General surgery referral placed to remove cysts on scalp.  - GENERAL SURG ADULT REFERRAL    Home care instructions were reviewed with the patient. The risks, benefits and treatment options of prescribed medications or other treatments have been discussed with the patient. The patient verbalized their understanding and should call or follow up if no improvement or if they develop further problems.    Patient Instructions   Start with stopping the Wellbutrin    After two weeks being off the Wellbutrin decrease to 20 mg of Celexa daily for 1-2 weeks then further decrease to 10 mg daily for 1-2 weeks then stop    General surgery referral placed      DEON Rosario [facility]      "

## 2019-03-18 ENCOUNTER — OFFICE VISIT (OUTPATIENT)
Dept: FAMILY MEDICINE | Facility: CLINIC | Age: 34
End: 2019-03-18
Payer: COMMERCIAL

## 2019-03-18 VITALS
SYSTOLIC BLOOD PRESSURE: 116 MMHG | TEMPERATURE: 99 F | DIASTOLIC BLOOD PRESSURE: 82 MMHG | WEIGHT: 184 LBS | BODY MASS INDEX: 31.41 KG/M2 | HEART RATE: 76 BPM | RESPIRATION RATE: 14 BRPM | HEIGHT: 64 IN

## 2019-03-18 DIAGNOSIS — D23.4 DERMOID CYST OF SCALP: Primary | ICD-10-CM

## 2019-03-18 DIAGNOSIS — F41.1 GENERALIZED ANXIETY DISORDER: ICD-10-CM

## 2019-03-18 DIAGNOSIS — F32.0 MILD MAJOR DEPRESSION (H): ICD-10-CM

## 2019-03-18 PROCEDURE — 99214 OFFICE O/P EST MOD 30 MIN: CPT | Performed by: NURSE PRACTITIONER

## 2019-03-18 ASSESSMENT — ANXIETY QUESTIONNAIRES
7. FEELING AFRAID AS IF SOMETHING AWFUL MIGHT HAPPEN: NOT AT ALL
GAD7 TOTAL SCORE: 2
GAD7 TOTAL SCORE: 2
4. TROUBLE RELAXING: NOT AT ALL
7. FEELING AFRAID AS IF SOMETHING AWFUL MIGHT HAPPEN: NOT AT ALL
1. FEELING NERVOUS, ANXIOUS, OR ON EDGE: SEVERAL DAYS
3. WORRYING TOO MUCH ABOUT DIFFERENT THINGS: SEVERAL DAYS
6. BECOMING EASILY ANNOYED OR IRRITABLE: NOT AT ALL
5. BEING SO RESTLESS THAT IT IS HARD TO SIT STILL: NOT AT ALL
2. NOT BEING ABLE TO STOP OR CONTROL WORRYING: NOT AT ALL
GAD7 TOTAL SCORE: 2

## 2019-03-18 ASSESSMENT — PATIENT HEALTH QUESTIONNAIRE - PHQ9
SUM OF ALL RESPONSES TO PHQ QUESTIONS 1-9: 4
10. IF YOU CHECKED OFF ANY PROBLEMS, HOW DIFFICULT HAVE THESE PROBLEMS MADE IT FOR YOU TO DO YOUR WORK, TAKE CARE OF THINGS AT HOME, OR GET ALONG WITH OTHER PEOPLE: NOT DIFFICULT AT ALL
SUM OF ALL RESPONSES TO PHQ QUESTIONS 1-9: 4

## 2019-03-18 ASSESSMENT — MIFFLIN-ST. JEOR: SCORE: 1519.62

## 2019-03-18 NOTE — PATIENT INSTRUCTIONS
Start with stopping the Wellbutrin    After two weeks being off the Wellbutrin decrease to 20 mg of Celexa daily for 1-2 weeks then further decrease to 10 mg daily for 1-2 weeks then stop    General surgery referral placed

## 2019-03-19 ASSESSMENT — PATIENT HEALTH QUESTIONNAIRE - PHQ9: SUM OF ALL RESPONSES TO PHQ QUESTIONS 1-9: 4

## 2019-03-19 ASSESSMENT — ANXIETY QUESTIONNAIRES: GAD7 TOTAL SCORE: 2

## 2019-04-01 ENCOUNTER — OFFICE VISIT (OUTPATIENT)
Dept: FAMILY MEDICINE | Facility: CLINIC | Age: 34
End: 2019-04-01
Payer: COMMERCIAL

## 2019-04-01 VITALS
RESPIRATION RATE: 16 BRPM | WEIGHT: 181.6 LBS | BODY MASS INDEX: 31.17 KG/M2 | SYSTOLIC BLOOD PRESSURE: 114 MMHG | DIASTOLIC BLOOD PRESSURE: 76 MMHG | HEART RATE: 84 BPM

## 2019-04-01 DIAGNOSIS — R87.810 CERVICAL HIGH RISK HPV (HUMAN PAPILLOMAVIRUS) TEST POSITIVE: Primary | ICD-10-CM

## 2019-04-01 PROCEDURE — 99212 OFFICE O/P EST SF 10 MIN: CPT | Mod: 25 | Performed by: FAMILY MEDICINE

## 2019-04-01 PROCEDURE — 88175 CYTOPATH C/V AUTO FLUID REDO: CPT | Performed by: FAMILY MEDICINE

## 2019-04-01 PROCEDURE — G0476 HPV COMBO ASSAY CA SCREEN: HCPCS | Performed by: FAMILY MEDICINE

## 2019-04-01 PROCEDURE — 88305 TISSUE EXAM BY PATHOLOGIST: CPT | Performed by: FAMILY MEDICINE

## 2019-04-01 PROCEDURE — 57505 ENDOCERVICAL CURETTAGE: CPT | Performed by: FAMILY MEDICINE

## 2019-04-01 NOTE — PROGRESS NOTES
SUBJECTIVE:   Tram Bal is a 33 year old female who presents to clinic today for the following health issues:    Colposcopy/LEEP  Date/Time: 4/2/2019 12:52 PM  Performed by: Batool Liang MD  Authorized by: Batool Liang MD     Consent:     Consent obtained:  Written    Consent given by:  Patient    Procedural risks discussed:  Bleeding, possible continued pain, failure rate, infection and repeat procedure    Patient questions answered: yes      Patient agrees, verbalizes understanding, and wants to proceed: yes      Educational handouts given: yes      Instructions and paperwork completed: yes    Pre-procedure:     Pre-procedure timeout performed: yes      Premeds:  Ibuprofen    Prepped with: acetic acid    Indication:     Indication:  HPV    HPV Indications:  Other high risk  Procedure:     Procedure: Endocervical curettage only      Under satisfactory analgesia the patient was prepped and draped in the dorsal lithotomy position: yes      [place] speculum was placed in the vagina: yes      Under colposcopic examination the transition zone was seen in entirety: yes      Intracervical block was performed: no      Endocervix was curetted using a Kevorkian curette: yes      Tampon inserted: no      Monsel's solution was applied: no      Biopsy(s): no      Specimen to pathology: yes    Post-procedure:     Findings: Negative      Impression: Normal appearing cervix      Patient tolerance of procedure:  Patient tolerated the procedure well with no immediate complications      Pt with sig history of abnl paps see below       Cervical high risk HPV (human papillomavirus) test positive         Priority: Medium [2]         Date Noted: 08/22/2005 7/04: LSIL, 10/04: [place]- JARETH I            4/05: LSIL. 6/05: [place] - JARETH II & III            8/05: LEEP - JARETH III            12/05: NIL pap            4/06: ASCUS, neg HPV            NIL paps: 10/06, 11/07, 12/08            1/10: ASCUS, neg HPV             NIL paps: 2/11, 4/10/12 pap NIL            2/20/13 pap NIL            3/31/14 pap NIL/neg HPV. Plan-- repeat pap/HPV in            1 year. If NIL/neg HPV again at that time, patient            will be able to go to  3 yr testing.4/20/15 pap            NIL/neg HPV. Plan: repeat pap and HPV testing in 3            years.            4/25/16: Pap - ASCUS, Neg HPV. Cotest in 1 year.             4/28/17:Pap: ASCUS, +High Risk HPV (Neg 16/18).             Plan [facility]-5/22/17:[facility]- Benign.  Pap: ASCUS, +HR            HPV (Not types 16/18). Plan cotest             in 1 year.             10/26/18 NIL Pap, + HR HPV (Neg 16/18). Plan colp                  Problem list and histories reviewed & adjusted, as indicated.  Additional history: as documented    Labs reviewed in EPIC    Reviewed and updated as needed this visit by clinical staff  Tobacco  Allergies  Meds  Problems  Med Hx  Surg Hx  Fam Hx  Soc Hx        Reviewed and updated as needed this visit by Provider  Tobacco  Allergies  Meds  Problems  Med Hx  Surg Hx  Fam Hx         ROS:  Constitutional, HEENT, cardiovascular, pulmonary, gi and gu systems are negative, except as otherwise noted.    OBJECTIVE:                                                    /76 (BP Location: Right arm, Patient Position: Chair, Cuff Size: Adult Regular)   Pulse 84   Resp 16   Wt 82.4 kg (181 lb 9.6 oz)   BMI 31.17 kg/m    Body mass index is 31.17 kg/m .  GENERAL APPEARANCE: healthy, alert and no distress   (female): normal cervix, adnexae, and uterus without masses or discharge and see procedure note above   PSYCH: mentation appears normal and affect normal/bright         ASSESSMENT/PLAN:                                                    1. Cervical high risk HPV (human papillomavirus) test positive    - Surgical pathology exam  - Pap imaged thin layer diagnostic with HPV (select HPV order below)  - HPV High Risk Types DNA Cervical  - Send outs misc  test      Patient Instructions   May have some cramping and bleeding today     I will let you know results in the next week via phone       Btaool Liang MD  [facility]

## 2019-04-01 NOTE — NURSING NOTE
"Chief Complaint   Patient presents with     Colposcopy       Initial /76 (BP Location: Right arm, Patient Position: Chair, Cuff Size: Adult Regular)   Pulse 84   Resp 16   Wt 82.4 kg (181 lb 9.6 oz)   BMI 31.17 kg/m   Estimated body mass index is 31.17 kg/m  as calculated from the following:    Height as of 3/18/19: 1.626 m (5' 4\").    Weight as of this encounter: 82.4 kg (181 lb 9.6 oz).    Patient presents to the clinic using No DME    Health Maintenance that is potentially due pending provider review:  NONE    Lindsay Harris MA  11:36 AM 4/1/2019  .      "

## 2019-04-01 NOTE — PATIENT INSTRUCTIONS
May have some cramping and bleeding today     I will let you know results in the next week via phone

## 2019-04-04 LAB
COPATH REPORT: NORMAL
PAP: NORMAL

## 2019-04-05 LAB
COPATH REPORT: NORMAL
FINAL DIAGNOSIS: ABNORMAL
HPV HR 12 DNA CVX QL NAA+PROBE: POSITIVE
HPV16 DNA SPEC QL NAA+PROBE: NEGATIVE
HPV18 DNA SPEC QL NAA+PROBE: NEGATIVE
SPECIMEN DESCRIPTION: ABNORMAL
SPECIMEN SOURCE CVX/VAG CYTO: ABNORMAL

## 2019-04-08 ENCOUNTER — MYC MEDICAL ADVICE (OUTPATIENT)
Dept: FAMILY MEDICINE | Facility: CLINIC | Age: 34
End: 2019-04-08

## 2019-04-08 DIAGNOSIS — F41.1 GENERALIZED ANXIETY DISORDER: Primary | ICD-10-CM

## 2019-04-09 RX ORDER — CITALOPRAM HYDROBROMIDE 20 MG/1
10 TABLET ORAL DAILY
Qty: 14 TABLET | Refills: 0 | Status: SHIPPED | OUTPATIENT
Start: 2019-04-09 | End: 2019-06-13

## 2019-04-18 ENCOUNTER — OFFICE VISIT (OUTPATIENT)
Dept: SURGERY | Facility: CLINIC | Age: 34
End: 2019-04-18
Payer: COMMERCIAL

## 2019-04-18 VITALS
SYSTOLIC BLOOD PRESSURE: 111 MMHG | HEART RATE: 78 BPM | RESPIRATION RATE: 18 BRPM | TEMPERATURE: 98.2 F | DIASTOLIC BLOOD PRESSURE: 71 MMHG

## 2019-04-18 DIAGNOSIS — L72.9 SCALP CYST: Primary | ICD-10-CM

## 2019-04-18 PROCEDURE — 99203 OFFICE O/P NEW LOW 30 MIN: CPT | Performed by: SURGERY

## 2019-04-18 NOTE — LETTER
4/18/2019         RE: Tram Bal  [address] [id number]        Dear Colleague,    Thank you for referring your patient, Tram Bal, to the [facility]. Please see a copy of my visit note below.    Surgical Consultation/History and Physical  [facility]    Tram is seen in consultation for Scalp cysts, at the request of DEON Rosario CNP.    Chief Complaint:  Scalp cysts    History of Present Illness: Tram Bal is a 33 year old female presents with scalp cysts.  They have been there for past 1-2 years.  They do not drain.  She has had cysts in the past which were excised in the operating room.  They have never become infected.  They bother her when she is performing hygiene or working on her hair.    Patient Active Problem List   Diagnosis     Screening examination for pulmonary tuberculosis     Cervical high risk HPV (human papillomavirus) test positive     Encounter for supervision of other normal pregnancy     Carrier or suspected carrier of group B Streptococcus     Displacement of lumbar intervertebral disc     CARDIOVASCULAR SCREENING; LDL GOAL LESS THAN 160     LAP-BAND surgery status     Tobacco abuse, in remission     Routine general medical examination at a health care facility     Encounter for routine gynecological examination     Encounter for other general counseling or advice on contraception     Generalized anxiety disorder     Bariatric surgery status     Overweight (BMI 25.0-29.9)     Class 1 drug-induced obesity without serious comorbidity with body mass index (BMI) of 32.0 to 32.9 in adult     Vitamin D deficiency     Mild major depression (H)       Past Medical History:   Diagnosis Date     ASCUS favor benign 4/25/16    Neg HPV     ASCUS with positive high risk HPV cervical 04/28/2017    Neg 16/18     Cervical high risk HPV (human papillomavirus) test positive 10/26/2018    see problem list     Depressive disorder  2016     Displacement of lumbar intervertebral disc 5/15/2008     Esophageal reflux      Hx of colposcopy with cervical biopsy 2017:[facility]- Benign.      Papanicolaou smear of cervix with low grade squamous intraepithelial lesion (LGSIL) 2004, 2005    '04 colp - JARETH I, '05 colp - JARETH II & III. LEEP     Past Surgical History:   Procedure Laterality Date     C NONSPECIFIC PROCEDURE      Oral surgery     COLPOSCOPY,LOOP ELECTRD CERVIX EXCIS  2005    JARETH III     GI SURGERY  11    LAP BANDING for obesity, pre-DM     Family History   Problem Relation Age of Onset     Thyroid Disease Mother      Unknown/Adopted Mother      Hyperlipidemia Father      Hypertension Paternal Grandmother      Hyperlipidemia Paternal Grandmother      Diabetes Paternal Aunt      Hypertension Paternal Aunt      Lipids Paternal Aunt      Thyroid Disease Maternal Half-Brother      Unknown/Adopted No family hx of         unaware of maternal grandparents history as pt mom was adopted     Breast Cancer No family hx of      Cancer - colorectal No family hx of      Gynecology No family hx of         no ovarian cancer     Coronary Artery Disease No family hx of      Cerebrovascular Disease No family hx of      Thyroid Disease No family hx of      Osteoporosis No family hx of      Social History     Tobacco Use     Smoking status: Former Smoker     Packs/day: 0.50     Years: 3.00     Pack years: 1.50     Last attempt to quit: 2010     Years since quittin.9     Smokeless tobacco: Never Used     Tobacco comment: quit smoking may 2010    Substance Use Topics     Alcohol use: Yes     Comment: occasionally      History   Drug Use No     Current Outpatient Medications   Medication Sig Dispense Refill     citalopram (CELEXA) 20 MG tablet Take 0.5 tablets (10 mg) by mouth daily 14 tablet 0     etonogestrel-ethinyl estradiol (NUVARING) 0.12-0.015 MG/24HR vaginal ring Insert 1 ring vaginally every 21 days then remove for  1 week then repeat with new ring. 9 each 3     Multiple Vitamins-Iron TABS Take by mouth daily        acyclovir (ZOVIRAX) 400 MG tablet TAKE 1 TABLET(400 MG) BY MOUTH THREE TIMES DAILY (Patient not taking: Reported on 4/18/2019) 30 tablet 0     Allergies   Allergen Reactions     Famvir [Famciclovir] Hives     Review of Systems:   Constitutional - Denies fevers, weight loss, malaise, lethargy  Neuro - Denies tremors or seizures  Pulmon - Denies SOB, dyspnea, hemoptysis, chronic cough or use of an inhaler  CV - Denies CP, SOB, lower extremity edema, difficulty w/ stairs, has never used NTG  GI - Denies hematemesis, BRBPR, melena, chronic diarrhea or epigastric pain   - Denies hematuria, difficulty voiding, h/o STDs  Hematology - Denies blood clotting disorders, chronic anemias  Dermatology - No melanomas or skin cancers  Rheumatology - No h/o RA  Pysch - Denies depression, bipolar d/o or schizophrenia    Physical Exam:  /71 (BP Location: Right arm, Patient Position: Chair, Cuff Size: Adult Regular)   Pulse 78   Temp 98.2  F (36.8  C) (Tympanic)   Resp 18     Constitutional- No acute distress, well nourished, non-toxic  Eyes: Anicteric, no injection.  PERRL  ENT:  Normocephalic, atraumatic, Nose midline, moist mucus membranes  Neck - supple, no LAD, Thyroid smooth, symmetric, Carotids without bruits  Respiratory- Clear to auscultation bilaterally, good inspiratory effort  Cardiovascular - Heart RRR, no lift's, thrills, murmurs, rubs, or gallop.  No peripheral edema.  No clubbing.  Neuro - No focal neuro deficits, Alert and oriented x 3  Psych: Appropriate mood and affect  Musculoskeletal: Normal gait, symmetric strength.  FROM upper and lower extremities.  Skin: Warm, Dry    4 cysts on superior scalp.  1.5 cm to 3 mm in size on superior aspect of scalp.    Assessment:  1. Scalp cyst      Plan:   Patient to return next week for in office excision of multiple scalp cysts which appear to be sebaceous cysts in  nature.  No evidence of superimposed infection at this time.  Discussed the indications, risks, benefits, alteratives with her.  The indications, risks, benefits and alternatives of excision of this mass were discussed with Tram Bal. The risks discussed included but were not limited to bleeding, infection, seroma, need for additional treatment, nontherapeutic intervention, wound complication (such as dehiscence), and rare complications related to surgery .  All of her questions were answered thoroughly and to her satisfaction, and informed consent was obtained. Consent was verified via teach back methodology.    She wishes to proceed with excision under local anesthetic in office next week when more time is available.    Brayan Dalton DO on 4/18/2019 at 2:13 PM      Again, thank you for allowing me to participate in the care of your patient.        Sincerely,        Brayan Dalton DO

## 2019-04-18 NOTE — PROGRESS NOTES
Surgical Consultation/History and Physical  [facility]    Tram is seen in consultation for Scalp cysts, at the request of DEON Rosario CNP.    Chief Complaint:  Scalp cysts    History of Present Illness: Tram Bal is a 33 year old female presents with scalp cysts.  They have been there for past 1-2 years.  They do not drain.  She has had cysts in the past which were excised in the operating room.  They have never become infected.  They bother her when she is performing hygiene or working on her hair.    Patient Active Problem List   Diagnosis     Screening examination for pulmonary tuberculosis     Cervical high risk HPV (human papillomavirus) test positive     Encounter for supervision of other normal pregnancy     Carrier or suspected carrier of group B Streptococcus     Displacement of lumbar intervertebral disc     CARDIOVASCULAR SCREENING; LDL GOAL LESS THAN 160     LAP-BAND surgery status     Tobacco abuse, in remission     Routine general medical examination at a health care facility     Encounter for routine gynecological examination     Encounter for other general counseling or advice on contraception     Generalized anxiety disorder     Bariatric surgery status     Overweight (BMI 25.0-29.9)     Class 1 drug-induced obesity without serious comorbidity with body mass index (BMI) of 32.0 to 32.9 in adult     Vitamin D deficiency     Mild major depression (H)       Past Medical History:   Diagnosis Date     ASCUS favor benign 4/25/16    Neg HPV     ASCUS with positive high risk HPV cervical 04/28/2017    Neg 16/18     Cervical high risk HPV (human papillomavirus) test positive 10/26/2018    see problem list     Depressive disorder 2016     Displacement of lumbar intervertebral disc 5/15/2008     Esophageal reflux      Hx of colposcopy with cervical biopsy 05/22/2017 5/22/17:[place]- Benign.      Papanicolaou smear of cervix with low grade squamous intraepithelial  lesion (LGSIL) 2004, 2005    '04 colp - JARETH I, '05 colp - JARETH II & III. LEEP     Past Surgical History:   Procedure Laterality Date     C NONSPECIFIC PROCEDURE      Oral surgery     COLPOSCOPY,LOOP ELECTRD CERVIX EXCIS  2005    JARETH III     GI SURGERY  11    LAP BANDING for obesity, pre-DM     Family History   Problem Relation Age of Onset     Thyroid Disease Mother      Unknown/Adopted Mother      Hyperlipidemia Father      Hypertension Paternal Grandmother      Hyperlipidemia Paternal Grandmother      Diabetes Paternal Aunt      Hypertension Paternal Aunt      Lipids Paternal Aunt      Thyroid Disease Maternal Half-Brother      Unknown/Adopted No family hx of         unaware of maternal grandparents history as pt mom was adopted     Breast Cancer No family hx of      Cancer - colorectal No family hx of      Gynecology No family hx of         no ovarian cancer     Coronary Artery Disease No family hx of      Cerebrovascular Disease No family hx of      Thyroid Disease No family hx of      Osteoporosis No family hx of      Social History     Tobacco Use     Smoking status: Former Smoker     Packs/day: 0.50     Years: 3.00     Pack years: 1.50     Last attempt to quit: 2010     Years since quittin.9     Smokeless tobacco: Never Used     Tobacco comment: quit smoking may 2010    Substance Use Topics     Alcohol use: Yes     Comment: occasionally      History   Drug Use No     Current Outpatient Medications   Medication Sig Dispense Refill     citalopram (CELEXA) 20 MG tablet Take 0.5 tablets (10 mg) by mouth daily 14 tablet 0     etonogestrel-ethinyl estradiol (NUVARING) 0.12-0.015 MG/24HR vaginal ring Insert 1 ring vaginally every 21 days then remove for 1 week then repeat with new ring. 9 each 3     Multiple Vitamins-Iron TABS Take by mouth daily        acyclovir (ZOVIRAX) 400 MG tablet TAKE 1 TABLET(400 MG) BY MOUTH THREE TIMES DAILY (Patient not taking: Reported on 2019) 30  tablet 0     Allergies   Allergen Reactions     Famvir [Famciclovir] Hives     Review of Systems:   Constitutional - Denies fevers, weight loss, malaise, lethargy  Neuro - Denies tremors or seizures  Pulmon - Denies SOB, dyspnea, hemoptysis, chronic cough or use of an inhaler  CV - Denies CP, SOB, lower extremity edema, difficulty w/ stairs, has never used NTG  GI - Denies hematemesis, BRBPR, melena, chronic diarrhea or epigastric pain   - Denies hematuria, difficulty voiding, h/o STDs  Hematology - Denies blood clotting disorders, chronic anemias  Dermatology - No melanomas or skin cancers  Rheumatology - No h/o RA  Pysch - Denies depression, bipolar d/o or schizophrenia    Physical Exam:  /71 (BP Location: Right arm, Patient Position: Chair, Cuff Size: Adult Regular)   Pulse 78   Temp 98.2  F (36.8  C) (Tympanic)   Resp 18     Constitutional- No acute distress, well nourished, non-toxic  Eyes: Anicteric, no injection.  PERRL  ENT:  Normocephalic, atraumatic, Nose midline, moist mucus membranes  Neck - supple, no LAD, Thyroid smooth, symmetric, Carotids without bruits  Respiratory- Clear to auscultation bilaterally, good inspiratory effort  Cardiovascular - Heart RRR, no lift's, thrills, murmurs, rubs, or gallop.  No peripheral edema.  No clubbing.  Neuro - No focal neuro deficits, Alert and oriented x 3  Psych: Appropriate mood and affect  Musculoskeletal: Normal gait, symmetric strength.  FROM upper and lower extremities.  Skin: Warm, Dry    4 cysts on superior scalp.  1.5 cm to 3 mm in size on superior aspect of scalp.    Assessment:  1. Scalp cyst      Plan:   Patient to return next week for in office excision of multiple scalp cysts which appear to be sebaceous cysts in nature.  No evidence of superimposed infection at this time.  Discussed the indications, risks, benefits, alteratives with her.  The indications, risks, benefits and alternatives of excision of this mass were discussed with Tram HURTADO  Valeriano. The risks discussed included but were not limited to bleeding, infection, seroma, need for additional treatment, nontherapeutic intervention, wound complication (such as dehiscence), and rare complications related to surgery .  All of her questions were answered thoroughly and to her satisfaction, and informed consent was obtained. Consent was verified via teach back methodology.    She wishes to proceed with excision under local anesthetic in office next week when more time is available.    Brayan Dalton, DO on 4/18/2019 at 2:13 PM

## 2019-04-18 NOTE — PATIENT INSTRUCTIONS
Per physician instructions.    If you have questions or concerns on any instructions given to you by your provider today or if you need to schedule an appointment, you can reach us at [phone].    Thank you!

## 2019-04-18 NOTE — NURSING NOTE
"Chief Complaint   Patient presents with     Consult     4 cysts on scalp        Initial /71 (BP Location: Right arm, Patient Position: Chair, Cuff Size: Adult Regular)   Pulse 78   Temp 98.2  F (36.8  C) (Tympanic)   Resp 18  Estimated body mass index is 31.17 kg/m  as calculated from the following:    Height as of 3/18/19: 1.626 m (5' 4\").    Weight as of 4/1/19: 82.4 kg (181 lb 9.6 oz).  BP completed using cuff size: regular long   Medications and allergies reviewed.      Alexia URIBE CMA     "

## 2019-04-25 ENCOUNTER — OFFICE VISIT (OUTPATIENT)
Dept: SURGERY | Facility: CLINIC | Age: 34
End: 2019-04-25
Payer: COMMERCIAL

## 2019-04-25 VITALS
HEART RATE: 86 BPM | SYSTOLIC BLOOD PRESSURE: 116 MMHG | RESPIRATION RATE: 18 BRPM | DIASTOLIC BLOOD PRESSURE: 76 MMHG | TEMPERATURE: 97.9 F

## 2019-04-25 DIAGNOSIS — L72.9 SCALP CYST: Primary | ICD-10-CM

## 2019-04-25 PROCEDURE — 88304 TISSUE EXAM BY PATHOLOGIST: CPT | Performed by: SURGERY

## 2019-04-25 PROCEDURE — 11422 EXC H-F-NK-SP B9+MARG 1.1-2: CPT | Performed by: SURGERY

## 2019-04-25 NOTE — PATIENT INSTRUCTIONS
Procedural Follow-up care   Most wounds heal within 10 days. But an infection may sometimes occur despite proper treatment. Be sure to check the wound daily for the signs of infection listed below. Stitches should be taken out within 7 to14 days (please schedule at the  before you leave.)  You may have surgical tape closures. If these have not fallen off after 7 days, you can remove them yourself unless you were told otherwise.  When to seek medical advice  Call your healthcare provider right away if any of these occur:    Pain in the wound gets worse    Redness, swelling, or pus coming from the wound    Fever of 100.4 F (38 C) or higher, or as directed by your provider    Stitches come apart or fall out, or surgical tape falls off before 5 days    The wound edges reopen    Numb feeling that doesn t go away by the time stitches are removed      Please call our office at [phone] with any questions or concerns.

## 2019-04-25 NOTE — NURSING NOTE
"Chief Complaint   Patient presents with     Minor Procedure     Excision of multiple scalp cysts        Initial /76 (BP Location: Right arm, Patient Position: Chair, Cuff Size: Adult Regular)   Pulse 86   Temp 97.9  F (36.6  C) (Tympanic)   Resp 18  Estimated body mass index is 31.17 kg/m  as calculated from the following:    Height as of 3/18/19: 1.626 m (5' 4\").    Weight as of 4/1/19: 82.4 kg (181 lb 9.6 oz).  BP completed using cuff size: regular   Medications and allergies reviewed.      Alexia URIBE CMA     "

## 2019-04-25 NOTE — PROGRESS NOTES
Mrs. Bal presents for excision of multiple scalp cysts.    Procedure Date: 04/25/19     PREOPERATIVE DIAGNOSIS: Multiple scalp masses  POSTOPERATIVE DIAGNOSIS: Same     PROCEDURE: Excision of multiple scalp masses (3)     ATTENDING SURGEON: Brayan Dalton DO    ESTIMATED BLOOD LOSS: 3mL    ANESTHESIA: 1% lidocaine with epinephrine     INDICATIONS FOR PROCEDURE: : Tram Bal presents with a history of an enlarging, tender scalp masses. After discussion was held with the patient regarding indications, risks, benefits and alternatives, benefits, and risks, her questions were addressed and she understood and wished to proceed. Specific risks discussed included bleeding, infection, seroma, need for additional treatment, nontherapeutic intervention, wound complication (such as dehiscence), recurrence, and rare complications related to surgery and/or anesthesia such as venous thromboembolism and cardiorespiratory complications.     PROCEDURE:    Local anesthetic was delivered, and an incision was carried out over the mass. The surrounding tissue was carefully dissected to free the mass, to a width of 1.5cm, length of 2.0cm, and subcutaneous depth of 1.0cm. Once excised this was passed off the field and sent routine to pathology. Hemostasis was assured.  The wound was closed with 3-0 Nylon suture.    Local anesthetic was delivered over the second area and an incision was carried out over the mass. The surrounding tissue was carefully dissected to free the mass, to a width of 1.0cm, length of 2.0cm, and subcutaneous depth of 1.0cm. Once excised this was passed off the field and sent routine to pathology. Hemostasis was assured.   The wound was closed with 3-0 Nylon suture.      An attempt was made in a similar fashion over a punctate lesion of 2 mm in size.  This could not be located.  The 5 mm incision was closed with sterile glue.    The patient tolerated the procedure well, was discharged home.    Brayan BLANCA  DO Krystle on 4/25/2019 at 4:38 PM

## 2019-04-25 NOTE — LETTER
4/25/2019         RE: Tram Bal  [address] [facility] [address]        Dear Colleague,    Thank you for referring your patient, Tram Bal, to the [facility]. Please see a copy of my visit note below.    Mrs. Bal presents for excision of multiple scalp cysts.    Procedure Date: 04/25/19     PREOPERATIVE DIAGNOSIS: Multiple scalp masses  POSTOPERATIVE DIAGNOSIS: Same     PROCEDURE: Excision of multiple scalp masses (3)     ATTENDING SURGEON: Brayan Dalton DO    ESTIMATED BLOOD LOSS: 3mL    ANESTHESIA: 1% lidocaine with epinephrine     INDICATIONS FOR PROCEDURE: : Tram Bal presents with a history of an enlarging, tender scalp masses. After discussion was held with the patient regarding indications, risks, benefits and alternatives, benefits, and risks, her questions were addressed and she understood and wished to proceed. Specific risks discussed included bleeding, infection, seroma, need for additional treatment, nontherapeutic intervention, wound complication (such as dehiscence), recurrence, and rare complications related to surgery and/or anesthesia such as venous thromboembolism and cardiorespiratory complications.     PROCEDURE:    Local anesthetic was delivered, and an incision was carried out over the mass. The surrounding tissue was carefully dissected to free the mass, to a width of 1.5cm, length of 2.0cm, and subcutaneous depth of 1.0cm. Once excised this was passed off the field and sent routine to pathology. Hemostasis was assured.  The wound was closed with 3-0 Nylon suture.    Local anesthetic was delivered over the second area and an incision was carried out over the mass. The surrounding tissue was carefully dissected to free the mass, to a width of 1.0cm, length of 2.0cm, and subcutaneous depth of 1.0cm. Once excised this was passed off the field and sent routine to pathology. Hemostasis was assured.   The wound was closed with 3-0 Nylon suture.       An attempt was made in a similar fashion over a punctate lesion of 2 mm in size.  This could not be located.  The 5 mm incision was closed with sterile glue.    The patient tolerated the procedure well, was discharged home.    Brayan Dalton DO on 4/25/2019 at 4:38 PM      Again, thank you for allowing me to participate in the care of your patient.        Sincerely,        Brayan Dalton DO

## 2019-04-30 LAB — COPATH REPORT: NORMAL

## 2019-05-02 ENCOUNTER — ALLIED HEALTH/NURSE VISIT (OUTPATIENT)
Dept: UROLOGY | Facility: CLINIC | Age: 34
End: 2019-05-02
Payer: COMMERCIAL

## 2019-05-02 DIAGNOSIS — L72.11 PILAR CYST: Primary | ICD-10-CM

## 2019-05-02 PROCEDURE — 99024 POSTOP FOLLOW-UP VISIT: CPT

## 2019-05-02 NOTE — NURSING NOTE
Tram is here today for suture removal. She is S/P excision of multiple scalp cyst on the 4-25-19. The site is clean and without drainage or  redness. The suture line is well approximated.  The sutures are removed without difficulty. Patient will call if there are any future concerns or problems. Mars

## 2019-05-29 ENCOUNTER — MYC MEDICAL ADVICE (OUTPATIENT)
Dept: FAMILY MEDICINE | Facility: CLINIC | Age: 34
End: 2019-05-29

## 2019-05-29 DIAGNOSIS — F32.0 MILD MAJOR DEPRESSION (H): ICD-10-CM

## 2019-05-29 DIAGNOSIS — F41.1 GENERALIZED ANXIETY DISORDER: Primary | ICD-10-CM

## 2019-06-13 ENCOUNTER — OFFICE VISIT (OUTPATIENT)
Dept: SURGERY | Facility: CLINIC | Age: 34
End: 2019-06-13
Payer: COMMERCIAL

## 2019-06-13 VITALS
HEIGHT: 64 IN | SYSTOLIC BLOOD PRESSURE: 100 MMHG | WEIGHT: 189.6 LBS | HEART RATE: 84 BPM | OXYGEN SATURATION: 98 % | DIASTOLIC BLOOD PRESSURE: 70 MMHG | BODY MASS INDEX: 32.37 KG/M2

## 2019-06-13 DIAGNOSIS — E66.09 CLASS 1 OBESITY DUE TO EXCESS CALORIES WITHOUT SERIOUS COMORBIDITY WITH BODY MASS INDEX (BMI) OF 32.0 TO 32.9 IN ADULT: ICD-10-CM

## 2019-06-13 DIAGNOSIS — Z98.84 BARIATRIC SURGERY STATUS: ICD-10-CM

## 2019-06-13 DIAGNOSIS — K91.2 POSTSURGICAL MALABSORPTION: ICD-10-CM

## 2019-06-13 DIAGNOSIS — E66.811 CLASS 1 OBESITY DUE TO EXCESS CALORIES WITHOUT SERIOUS COMORBIDITY WITH BODY MASS INDEX (BMI) OF 32.0 TO 32.9 IN ADULT: ICD-10-CM

## 2019-06-13 DIAGNOSIS — Z46.51 FITTING AND ADJUSTMENT OF GASTRIC LAP BAND: Primary | ICD-10-CM

## 2019-06-13 PROCEDURE — S2083 ADJUSTMENT GASTRIC BAND: HCPCS | Performed by: PHYSICIAN ASSISTANT

## 2019-06-13 PROCEDURE — 99207 ZZC NO CHARGE LOS: CPT | Performed by: PHYSICIAN ASSISTANT

## 2019-06-13 ASSESSMENT — MIFFLIN-ST. JEOR
SCORE: 1545.02
SCORE: 1545.27

## 2019-06-13 NOTE — PROGRESS NOTES
"BAND ASSESSMENT VISIT    June 13, 2019    VITALS:          Weight: 86 kg (189 lb 9.6 oz)         BMI (Calculated): 32.53         Wt change since last visit (lbs): 2.2         Cumulative weight loss (lbs): 23.4    SUBJECTIVE:  Patient comes to the clinic today for band assessment.  In regards to the patient's band, the patient feels they need fluid added to their band.  Has felt she needed this for the past 3 months. She is not satisfied with her weight.  She is exercising 3x weekly or more.  Gained some weight from being on some antidepressents for a few years and when we had to unfill her band.  Is trying to lose this and find her sweet spot again.      BAND ROS:  Hungry between meals:    Yes  Eating between meals:    Yes  Eat >1 cup of food at meals:    Yes  Not losing 1-2 lbs a week:    Yes  Not feeling sense of restriction:   Yes    Have pain when swallowing:    No  Have heartburn, vomiting or reflux:   No  Have night cough or hiccups:   No  Making poor food choices:    No  Unable to eat chicken, steak and bread: No    Is pregnant      No  Will be traveling to remote areas   No  Will have surgery soon.   No    Not on heartburn medication not using NSAIDS.    Takes a Multivitamin daily.    No calcium or Vitamin D.     Component      Latest Ref Rng & Units 10/26/2018   WBC      4.0 - 11.0 10e9/L 9.1   RBC Count      3.8 - 5.2 10e12/L 4.46   Hemoglobin      11.7 - 15.7 g/dL 12.4   Hematocrit      35.0 - 47.0 % 37.6   MCV      78 - 100 fl 84   MCH      26.5 - 33.0 pg 27.8   MCHC      31.5 - 36.5 g/dL 33.0   RDW      10.0 - 15.0 % 15.2 (H)   Platelet Count      150 - 450 10e9/L 416   TSH      0.40 - 4.00 mU/L 1.20   Vitamin D Deficiency screening      20 - 75 ug/L 17 (L)   Vitamin B12      193 - 986 pg/mL 386       /70   Pulse 84   Ht 1.626 m (5' 4\")   Wt 86 kg (189 lb 9.6 oz)   SpO2 98%   BMI 32.54 kg/m    HEART: No JVD  LUNGS: Breathing unlabored.  ABD: Soft, NT, incisions well healed  SKIN: No " intertriginous irritation under pannus  MUSC: Gait normal  NEURO: Alert and oriented x3.    ASSESSMENT:    1.  S/P adjustable band surgery  2.  Malnutrition following GI Surgery  3.  Vitamin D deficiency    PLAN: After evaluation, we have elected to adjust her gastric band. Risk, benefits, and alternatives were reviewed before a consent was signed. Pt wishes to proceed. Pt will follow up in 3-4 weeks. for annual appointment and subsequent assessment.  Will order annual labs for her to have drawn at home clinic before appointment. Discussed starting 5000 Int Units of Vitamin D daily.     PROCEDURE:  Adjustment of gastric band     PROCEDURE DETAILS: In the clinic exam room, the patient was placed in supine position on the exam table. The area over the access port was prepped with an alcohol swab, gloves were donned, and a Hurley needle and syringe were directed into the port under palpation guidance. A small amount of saline was aspirated to verify location, and 0.5 mls was delivered into the port. Access needle was withdrawn and bandaid was applied. Patient sat up and drank 4 ounces of lukewarm water without difficulty. Pt should return to a liquid diet and advance as tolerated. Tight band warning signs were reviewed.  Pt left home in a stable and ambulatory condition.

## 2019-07-17 LAB
LOCATION PERFORMED: NORMAL
NORMAL RANGE FOR SEND OUTS MISC TEST: NORMAL
RESULT: NORMAL
SEND OUTS MISC TEST CODE: NORMAL
SEND OUTS MISC TEST SPECIMEN: NORMAL
TEST NAME: NORMAL

## 2019-09-17 ENCOUNTER — TELEPHONE (OUTPATIENT)
Dept: FAMILY MEDICINE | Facility: CLINIC | Age: 34
End: 2019-09-17

## 2019-11-04 ENCOUNTER — HEALTH MAINTENANCE LETTER (OUTPATIENT)
Age: 34
End: 2019-11-04

## 2019-11-18 DIAGNOSIS — Z30.015 ENCOUNTER FOR INITIAL PRESCRIPTION OF VAGINAL RING HORMONAL CONTRACEPTIVE: ICD-10-CM

## 2019-11-18 RX ORDER — ETONOGESTREL AND ETHINYL ESTRADIOL VAGINAL RING .015; .12 MG/D; MG/D
RING VAGINAL
Qty: 9 EACH | Refills: 0 | Status: SHIPPED | OUTPATIENT
Start: 2019-11-18 | End: 2020-10-30

## 2019-11-18 NOTE — TELEPHONE ENCOUNTER
"Requested Prescriptions   Pending Prescriptions Disp Refills     etonogestrel-ethinyl estradiol (NUVARING) 0.12-0.015 MG/24HR vaginal ring [Pharmacy Med Name: NUVARING]  0     Sig: INSERT ONE RING VAGINALLY EVERY 21 DAYS THEN REMOVE FOR ONE WEEK THEN REPEAT WITH NEW RING       Contraceptives Protocol Passed - 11/18/2019  2:27 PM        Passed - Patient is not a current smoker if age is 35 or older        Passed - Recent (12 mo) or future (30 days) visit within the authorizing provider's specialty     Patient has had an office visit with the authorizing provider or a provider within the authorizing providers department within the previous 12 mos or has a future within next 30 days. See \"Patient Info\" tab in inbasket, or \"Choose Columns\" in Meds & Orders section of the refill encounter.              Passed - Medication is active on med list        Passed - No active pregnancy on record        Passed - No positive pregnancy test in past 12 months        Last Written Prescription Date:  10/28/18  Last Fill Quantity: 9,  # refills: 3   Last office visit: 4/1/2019 with prescribing provider:  Azul   Future Office Visit:      "

## 2019-12-16 ENCOUNTER — TELEPHONE (OUTPATIENT)
Dept: SURGERY | Facility: CLINIC | Age: 34
End: 2019-12-16

## 2019-12-16 NOTE — TELEPHONE ENCOUNTER
salomem for pt to call. There is a questionnaire in her mychart we would appreciate having filled out before her appointment tomorrow. It is under questionnaires when you click on the yellow health tab when signed into [employer].    Maria Fernanda Harper MA

## 2019-12-16 NOTE — PROGRESS NOTES
"NUTRITION POST OP APPOINTMENT  DATE OF VISIT: December 16, 2019    Tram Bal  1985  female  [id number]  34 year old     ASSESSMENT:    REASON FOR VISIT:  Tram is a 34 year old year old female presents today for 8 year PO nutrition follow-up appointment. Patient is accompanied by self.    DIAGNOSIS:  Status post laparoscopic band surgery.  Obesity Grade I BMI 30-34.9     ANTHROPOMETRICS:  Initial Weight: 213 lb  Height:  64\"  Current Weight:  183.7 lb   BMI: 31.78   kg/(m^2).    VITAMINS AND MINERALS:  1 Multivitamin with Mineral(women's formula) -3X per week    NUTRITION HISTORY:  Breakfast: skips most days   Lunch: eggs, sausage @ 11 a.m.  Supper: 3 oz chicken or salmon or 1/2 hamburger protein, green beans @ 8 pm  Snacks: cup of soup a work 0-1X per day  Fluids consumed: Water, decaf coffee+ flavored creamer  Consuming liquid calories: Yes  Protein intake: 40-50 grams/day  Tolerate regular texture food: Yes  Any foods not tolerated details: Yes  If any food not tolerated: bread  Portion size: 1 cup  Take 20-30 minutes to consume each meal: Yes   Eat protein foods first: Yes  Fluids and meals separate by at least 30 minutes: Yes  Chew foods thoroughly: Yes  Tolerating diet: Yes  Drinking high protein supplements: No  Consuming snacks per day: 0-1  Additional Information: Had fluid removed from band today. Works 4 pm- midnight as a . Patient  Want to get back on track with: weight loss, vitamins, eating and exercise.        PHYSICAL ACTIVITY:  Type: none      DIAGNOSIS:  Previous Nutrition Diagnosis: Altered gastrointestinal function related to alteration in gastrointestinal structure as evidenced by history of laparoscopic band surgery.- no change    Previous goals: (5/18/15)  Transition to decaf coffee-met  Start: 8496-4932 mg calcium with vitamin D (2-3 separate doses), 2000 International Units vitamin D, iron+ vitamin C-not met  Aim for a minimum of 60 g protein per day, read nutrition " labels for protein    Current Nutrition Diagnosis: Altered gastrointestinal function related to alteration in gastrointestinal structure as evidenced by history of laparoscopic band surgery.    INTERVENTION:   Nutrition Prescription: Eat 3 meals a day at regular intervals. Consume 60-90 grams of protein daily. Follow post-surgical vitamin and mineral protocol.  Assessed learning needs and learning preferences.    GOALS:  Eat 3 meals per day or have a protein drink (offered suggestions)  Aim to eat 60-90 g protein per day (written information provided)  Follow post-op vitamin/ mineral schedule for adjustable gastric band/ try using a pill organizer      Implementation: Discussed progress toward previous goals; reinforced importance of following bariatric lifestyle changes.    NUTRITION MONITORING AND EVALUATION:  Anticipated compliance: fair  Verbalized fair-good understanding.    Follow up:   1 month with PA-C (will verify insurance coverage for RD visit for 2020)  Patient to follow up in 12 months.    TIME SPENT WITH PATIENT:  25 minutes    Tomy Wagner RD, GERHARD  [facility]  [phone]

## 2019-12-17 ENCOUNTER — OFFICE VISIT (OUTPATIENT)
Dept: SURGERY | Facility: CLINIC | Age: 34
End: 2019-12-17
Payer: COMMERCIAL

## 2019-12-17 VITALS
HEIGHT: 64 IN | BODY MASS INDEX: 31.36 KG/M2 | HEART RATE: 74 BPM | OXYGEN SATURATION: 98 % | WEIGHT: 183.7 LBS | DIASTOLIC BLOOD PRESSURE: 82 MMHG | SYSTOLIC BLOOD PRESSURE: 118 MMHG

## 2019-12-17 DIAGNOSIS — E66.811 CLASS 1 OBESITY DUE TO EXCESS CALORIES WITHOUT SERIOUS COMORBIDITY WITH BODY MASS INDEX (BMI) OF 31.0 TO 31.9 IN ADULT: ICD-10-CM

## 2019-12-17 DIAGNOSIS — E55.9 VITAMIN D DEFICIENCY: ICD-10-CM

## 2019-12-17 DIAGNOSIS — E66.09 CLASS 1 OBESITY DUE TO EXCESS CALORIES WITHOUT SERIOUS COMORBIDITY WITH BODY MASS INDEX (BMI) OF 31.0 TO 31.9 IN ADULT: ICD-10-CM

## 2019-12-17 DIAGNOSIS — E66.811 CLASS 1 DRUG-INDUCED OBESITY WITHOUT SERIOUS COMORBIDITY WITH BODY MASS INDEX (BMI) OF 32.0 TO 32.9 IN ADULT: ICD-10-CM

## 2019-12-17 DIAGNOSIS — K91.2 POSTSURGICAL MALABSORPTION: ICD-10-CM

## 2019-12-17 DIAGNOSIS — Z46.51 FITTING AND ADJUSTMENT OF GASTRIC LAP BAND: Primary | ICD-10-CM

## 2019-12-17 DIAGNOSIS — Z98.84 BARIATRIC SURGERY STATUS: ICD-10-CM

## 2019-12-17 DIAGNOSIS — E66.1 CLASS 1 DRUG-INDUCED OBESITY WITHOUT SERIOUS COMORBIDITY WITH BODY MASS INDEX (BMI) OF 32.0 TO 32.9 IN ADULT: ICD-10-CM

## 2019-12-17 PROCEDURE — S2083 ADJUSTMENT GASTRIC BAND: HCPCS | Performed by: PHYSICIAN ASSISTANT

## 2019-12-17 PROCEDURE — 99214 OFFICE O/P EST MOD 30 MIN: CPT | Mod: 25 | Performed by: PHYSICIAN ASSISTANT

## 2019-12-17 PROCEDURE — 97803 MED NUTRITION INDIV SUBSEQ: CPT | Performed by: DIETITIAN, REGISTERED

## 2019-12-17 ASSESSMENT — MIFFLIN-ST. JEOR: SCORE: 1509.29

## 2019-12-17 NOTE — PATIENT INSTRUCTIONS
Have follow up labs drawn.    Check in at the [facility] on the 1st floor if you would like to have your blood tests drawn today.  You may also get your blood drawn at any [facility] lab.  Lab results will be released through [employer] once resulted. Please allow 5 business days for me to review them and send you a personal message through [employer] regarding the results.    Activity goal:  Return to gym 2-3 times a week.  Enjoys elliptical machine.    Motivation:  Write in the calender.  Put it in as a meeting.     Restart Postoperative Vitamins     1 Complete multivitamins with minerals at bedtime    Vitamin D 2000 Int. Units Daily     Calcium+D  6718-4815 mg in divided doses (Do not take at same time as multivitamin or iron)  600 mg twice daily.    Return to clinic in 1 month for band assessment and medical weight loss visit.

## 2019-12-17 NOTE — PROGRESS NOTES
BARIATRIC FOLLOW UP BAND VISIT     December 17, 2019    HISTORY OF PRESENT ILLNESS: Pt returns today for her follow-up appointment status post adjustable gastric band surgery.     Initial Weight: 213 lb (96.6 kg)   Current Weight: 183 lb 11.2 oz (83.3 kg)  Cumulative weight loss (lbs): 29.3  Last Visits Weight: 189 lb 9.6 oz (86 kg)     Patient is taking the following bariatric postoperative vitamins:  1 Complete multivitamins with minerals (at different times than calcium) (Taking a couple times a week)  No Vitamin D daily  No calcium    Pt did well with exercise during the summer while she was training for a warrior dash.  She pulled muscles during that and then let up on exercise following this.  IN the fall she has not been exercised.     In regards to the patient's band, the patient feels fluid needs to be removed from the band.    Pt would like to lose what she gained from when her band was emptied in 2017 due to too much restriction. Around 165 lbs or BMI of 28.     SOCIAL HISTORY:  Pt denies smoking.  Pt denies alcohol use.  Avoids NSAIDS.      REVIEW OF SYSTEMS:     GI:  Nausea-none  Vomiting-none  Diarrhea-none  Constipation-none  Dysphagia-none  Abdominal Pain-none  Heartburn-yes see Band, ROS     SKIN:  Intertriginous irritation-none     PSYCH:  Mood stable    :  Has regular monthly periods.     BAND ROS:  Hungry between meals:    No  Eating between meals:    No  Eat >1 cup of food at meals:    No  Not losing 1-2 lbs a week:    No  Not feeling sense of restriction:   No    Have pain when swallowing:    No  Have heartburn, vomiting or reflux:   Yes  Have night cough or hiccups:   No  Making poor food choices:    Yes- more crackers choices at night due to heartburn  Unable to eat chicken, steak and bread: No    Is pregnant      No  Will be traveling to remote areas   No  Will have surgery soon.   No      LABS/IMAGING/MEDICAL RECORDS REVIEW:   LABS:  Vitamin D Deficiency screening   Date Value Ref Range  "Status   10/26/2018 17 (L) 20 - 75 ug/L Final     Comment:     Season, race, dietary intake, and treatment affect the concentration of   25-hydroxy-Vitamin D. Values may decrease during winter months and increase   during summer months. Values 20-29 ug/L may indicate Vitamin D insufficiency   and values <20 ug/L may indicate Vitamin D deficiency.  Vitamin D determination is routinely performed by an immunoassay specific for   25 hydroxyvitamin D3.  If an individual is on vitamin D2 (ergocalciferol)   supplementation, please specify 25 OH vitamin D2 and D3 level determination by   LCMSMS test [id number].       Parathyroid Hormone Intact   Date Value Ref Range Status   03/31/2014 26 12 - 72 pg/mL Final     Vitamin B12   Date Value Ref Range Status   10/26/2018 386 193 - 986 pg/mL Final     Hemoglobin   Date Value Ref Range Status   10/26/2018 12.4 11.7 - 15.7 g/dL Final     Ferritin   Date Value Ref Range Status   03/14/2016 39 12 - 150 ng/mL Final     Iron   Date Value Ref Range Status   03/31/2014 87 35 - 180 ug/dL Final     Iron Binding Cap   Date Value Ref Range Status   03/31/2014 409 240 - 430 ug/dL Final     Iron Saturation Index   Date Value Ref Range Status   03/31/2014 21 15 - 46 % Final   07/09/2013 22 15 - 46 % Final   10/10/2011 24 15 - 46 % Final       PHYSICAL EXAMINATION:   /82   Pulse 74   Ht 5' 3.75\" (1.619 m)   Wt 183 lb 11.2 oz (83.3 kg)   SpO2 98%   BMI 31.78 kg/m      GENERAL: Alert and oriented x3. NAD  HEART: No murmurs, rubs or gallops, Regular rate and rhythm  LUNGS: Breathing unlabored, Lung sounds clear to auscultation bilaterally  ABDOMEN: soft; nontender; nondistended, incision well healed. No hernia  EXTREMITIES: No LE edema bilaterally, Gait normal  SKIN: No intertriginous irritation or rash      ASSESSMENT AND PLAN:    7 years Status post adjustable gastric band.     Obesity Body mass index is 31.78 kg/m .   Activity goal:  Return to gym 2-3 times a week.  Enjoys elliptical " machine.Motivation:  Write in the calender.  Put it in as a meeting.     Discussed pts goal to lose 15-20 lbs in 2020 to get down to BMI around 28.    Plan 1. Band adjusted to green zone, ramp up activity to 30 minutes most days of week, add medical weight management after new year and when pt has made lifestyle changes to assist with weight loss.     Post surgical malabsorption:   Ordered CBC, vitamin B12, vitamin D, PTH, ferritin, labs.   Follow food plan per dietitian recommendations.   Restart taking recommended post-op vitamins.    Vitamin D deficiency   Ordered vitamin D, PTH labs.   Restart Vitamin D 2000 Int. Units Daily and Calcium+D  6251-7794 mg in divided doses     Adjustment of gastric band  After evaluation, we have elected to adjust the gastric band. Risk, benefits, and alternatives were reviewed before a consent was signed. Pt wishes to proceed.      PROCEDURE: Adjustment of gastric band     PROCEDURE DETAILS: In the clinic exam room, the patient was placed in supine position on the exam table. The area over the access port was prepped with an alcohol swab, gloves were donned, and a Hurley needle and syringe were directed into the port under palpation guidance. A small amount of saline was aspirated to verify location, and 0.5 mls was removed from the port. Access needle was withdrawn and bandaid was applied. Patient sat up and drank 4 ounces of lukewarm water without difficulty. Pt should return to a liquid diet and advance as tolerated. Tight band warning signs were reviewed.  Pt left home in a stable and ambulatory condition.   Follow up: Return to clinic in 1 month for band assessment and medical weight loss visit.   I spent a total of 25 minutes face to face with Tram during today's office visit. Over 50% of this time was spent counseling the patient and/or coordinating care.

## 2019-12-30 ENCOUNTER — VIRTUAL VISIT (OUTPATIENT)
Dept: FAMILY MEDICINE | Facility: OTHER | Age: 34
End: 2019-12-30

## 2019-12-30 NOTE — PROGRESS NOTES
"Date: 2019 12:00:43  Clinician: Clifford Roberto  Clinician NPI: [id number]  Patient: Tram Bal  Patient : 1985  Patient Address: [address] [place] [address]  Patient Phone: [phone]  Visit Protocol: URI  Patient Summary:  Tram is a 34 year old ( : 1985 ) female who initiated a Visit for cold, sinus infection, or influenza. When asked the question \"Please sign me up to receive news, health information and promotions. \", Tram responded \"No\".    Tram states her symptoms started gradually 7-9 days ago.   Her symptoms consist of a headache, a sore throat, ear pain, enlarged lymph nodes, nasal congestion, malaise, rhinitis, a cough, and facial pain or pressure. Tram also feels feverish but was unable to measure her temperature.   Symptom details     Nasal secretions: The color of her mucus is yellow.    Cough: Tram coughs every 5-10 minutes and her cough is more bothersome at night. Phlegm comes into her throat when she coughs. She does not believe the phlegm causes the cough. The color of the phlegm is yellow.     Sore throat: Tram reports having moderate throat pain (4-6 on a 10 point pain scale), does not have exudate on her tonsils, and can swallow liquids. The lymph nodes in her neck are enlarged. A rash has not appeared on the skin since the sore throat started.     Facial pain or pressure: The facial pain or pressure feels worse when bending over or leaning forward.     Headache: She states the headache is moderate (4-6 on a 10 point pain scale).      Tram denies having chills, wheezing, teeth pain, and myalgias. She also denies double sickening (worsening symptoms after initial improvement), taking antibiotic medication for the symptoms, having recent facial or sinus surgery in the past 60 days, and having a sinus infection within the past year. She is not experiencing dyspnea.   Precipitating events  Tram is not sure if she has been exposed to " someone with strep throat. She has not recently been exposed to someone with influenza. Tram has been in close contact with the following high risk individuals: immunocompromised people, adults 65 or older, pregnant women, children under the age of 5, and people with asthma, heart disease or diabetes.   Pertinent medical history  Tram does not get yeast infections when she takes antibiotics.   Weight: 180 lbs   Tram does not smoke or use smokeless tobacco.   She denies pregnancy and denies breastfeeding. She has menstruated in the past month.   Weight: 180 lbs    MEDICATIONS: Vicks NyQuil Cold/Flu Liquicap oral, ALLERGIES: Famvir  Clinician Response:  Dear Tram,  Based on the information provided, you have acute bacterial sinusitis, also known as a sinus infection. Sinus infections are caused by bacteria or a virus and symptoms are almost always identical. The difference between the 2 types of infections is timing.  Sinus infections start as viral infections and symptoms improve on their own in about 7 days. If symptoms have not improved after 7 days or have even worsened, a bacterial infection may have developed.  Medication information  I am prescribing:     Amoxicillin 500 mg oral tablet. Take 1 tablet by mouth every 8 hours for 7 days. There are no refills with this prescription.   Yeast infections can be a common side effect of antibiotics. The most common symptom of a yeast infection is itchiness in and around the vagina. Other signs and symptoms include burning, redness, or a thick, white vaginal discharge that looks like cottage cheese and does not have a bad smell.  If you become pregnant during this course of treatment, stop taking the medication and contact your primary care provider.  Self care  The following tips will keep you as comfortable as possible while you recover:     Rest    Drink plenty of water and other liquids    Take a hot shower to loosen congestion    Use throat lozenges     Gargle with warm salt water (1/4 teaspoon of salt per 8 ounce glass of water)    Suck on frozen items such as popsicles or ice cubes    Drink hot tea with lemon and honey    Take a spoonful of honey to reduce your cough     When to seek care  Please be seen in a clinic or urgent care if any of the following occur:     Symptoms do not start to improve after 3 days of treatment    New symptoms develop, or symptoms become worse     It is possible to have an allergic reaction to an antibiotic even if you have not had one in the past. If you notice a new rash, significant swelling, or difficulty breathing, stop taking this medication immediately and go to a clinic or urgent care.  Call 911 or go to the emergency room if you feel that your throat is closing off, you suddenly develop a rash, you are unable to swallow fluids, you are drooling, or you are having difficulty breathing.   Diagnosis: Acute bacterial sinusitis  Diagnosis ICD: J01.90  Prescription: amoxicillin 500 mg oral tablet 21 tablet, 7 days supply. Take 1 tablet by mouth every 8 hours for 7 days. Refills: 0, Refill as needed: no, Allow substitutions: yes  Pharmacy: [id number] - [phone] - [address] [place] [address]

## 2020-01-14 DIAGNOSIS — E55.9 VITAMIN D DEFICIENCY: ICD-10-CM

## 2020-01-14 DIAGNOSIS — K91.2 POSTSURGICAL MALABSORPTION: ICD-10-CM

## 2020-01-14 DIAGNOSIS — Z98.84 BARIATRIC SURGERY STATUS: ICD-10-CM

## 2020-01-14 LAB
ERYTHROCYTE [DISTWIDTH] IN BLOOD BY AUTOMATED COUNT: 15.1 % (ref 10–15)
FERRITIN SERPL-MCNC: 9 NG/ML (ref 12–150)
HCT VFR BLD AUTO: 41.2 % (ref 35–47)
HGB BLD-MCNC: 12.9 G/DL (ref 11.7–15.7)
MCH RBC QN AUTO: 28.1 PG (ref 26.5–33)
MCHC RBC AUTO-ENTMCNC: 31.3 G/DL (ref 31.5–36.5)
MCV RBC AUTO: 90 FL (ref 78–100)
PLATELET # BLD AUTO: 404 10E9/L (ref 150–450)
PTH-INTACT SERPL-MCNC: 21 PG/ML (ref 18–80)
RBC # BLD AUTO: 4.59 10E12/L (ref 3.8–5.2)
VIT B12 SERPL-MCNC: 414 PG/ML (ref 193–986)
WBC # BLD AUTO: 8.3 10E9/L (ref 4–11)

## 2020-01-14 PROCEDURE — 82306 VITAMIN D 25 HYDROXY: CPT | Performed by: PHYSICIAN ASSISTANT

## 2020-01-14 PROCEDURE — 82607 VITAMIN B-12: CPT | Performed by: PHYSICIAN ASSISTANT

## 2020-01-14 PROCEDURE — 85027 COMPLETE CBC AUTOMATED: CPT | Performed by: PHYSICIAN ASSISTANT

## 2020-01-14 PROCEDURE — 36415 COLL VENOUS BLD VENIPUNCTURE: CPT | Performed by: PHYSICIAN ASSISTANT

## 2020-01-14 PROCEDURE — 83970 ASSAY OF PARATHORMONE: CPT | Performed by: PHYSICIAN ASSISTANT

## 2020-01-14 PROCEDURE — 82728 ASSAY OF FERRITIN: CPT | Performed by: PHYSICIAN ASSISTANT

## 2020-01-15 LAB — DEPRECATED CALCIDIOL+CALCIFEROL SERPL-MC: 13 UG/L (ref 20–75)

## 2020-01-16 ENCOUNTER — OFFICE VISIT (OUTPATIENT)
Dept: SURGERY | Facility: CLINIC | Age: 35
End: 2020-01-16
Payer: COMMERCIAL

## 2020-01-16 VITALS
BODY MASS INDEX: 31.91 KG/M2 | WEIGHT: 186.9 LBS | HEART RATE: 79 BPM | HEIGHT: 64 IN | SYSTOLIC BLOOD PRESSURE: 106 MMHG | OXYGEN SATURATION: 99 % | DIASTOLIC BLOOD PRESSURE: 72 MMHG

## 2020-01-16 DIAGNOSIS — Z98.84 BARIATRIC SURGERY STATUS: ICD-10-CM

## 2020-01-16 DIAGNOSIS — Z46.51 FITTING AND ADJUSTMENT OF GASTRIC LAP BAND: ICD-10-CM

## 2020-01-16 DIAGNOSIS — E66.811 CLASS 1 OBESITY DUE TO EXCESS CALORIES WITHOUT SERIOUS COMORBIDITY WITH BODY MASS INDEX (BMI) OF 32.0 TO 32.9 IN ADULT: Primary | ICD-10-CM

## 2020-01-16 DIAGNOSIS — E66.09 CLASS 1 OBESITY DUE TO EXCESS CALORIES WITHOUT SERIOUS COMORBIDITY WITH BODY MASS INDEX (BMI) OF 32.0 TO 32.9 IN ADULT: Primary | ICD-10-CM

## 2020-01-16 DIAGNOSIS — E61.1 IRON DEFICIENCY: ICD-10-CM

## 2020-01-16 DIAGNOSIS — E55.9 VITAMIN D DEFICIENCY: ICD-10-CM

## 2020-01-16 PROCEDURE — 99214 OFFICE O/P EST MOD 30 MIN: CPT | Performed by: PHYSICIAN ASSISTANT

## 2020-01-16 PROCEDURE — S2083 ADJUSTMENT GASTRIC BAND: HCPCS | Performed by: PHYSICIAN ASSISTANT

## 2020-01-16 RX ORDER — PHENTERMINE HYDROCHLORIDE 37.5 MG/1
0.5 TABLET ORAL EVERY MORNING
Qty: 15 TABLET | Refills: 1 | Status: SHIPPED | OUTPATIENT
Start: 2020-01-16 | End: 2020-03-05

## 2020-01-16 ASSESSMENT — MIFFLIN-ST. JEOR: SCORE: 1523.8

## 2020-01-16 NOTE — PROGRESS NOTES
Band Assessment/ Medical Weight Management Visit    Date: 2020  Name: Tram Bal  MR#: [id number]  : 1985    VITALS:          Weight: 186 lbs 14.4 oz         BMI: Body mass index is 32.33 kg/m .         Wt change since last visit (lbs): 3.2         Cumulative weight loss (lbs): 26.1    SUBJECTIVE:  Patient comes to the clinic today for band assessment.  In regards to the patient's band, the patient feels they need fluid added to their band. Took 0.5 ml out at last visit and she felt loose right away.      Pt has not restarted her vitamin D.    WEIGHT SELF ASSESSMENT:    2020   Are you satisfied with your weight or weight loss? No   Please check the boxes (1-3 boxes) which you think have been influencing your weight in the past month. The band is not in the right setting       CURRENT EXERCISE AND ACTIVITY:    2020   I am exercising 3x weekly or more.  Yes   Please select the statement that best describes your ability to exercise and be active in the past 4 weeks or since your last clinic visit: I am as active as I can possibly be   Has restarted going back to the gym about 3 times a week.  Is doing ellipictal and then random weights.  Is there for 1/2 hour to hour for last 2 weeks.     BAND ROS:    2020   I am hungry between meals? Yes   I am eating between meals? Yes   I am eating > 1 cup of food at meals? Yes   I am not losing 1-2 lbs a week? Yes   I do not feel a sense of restriction? Yes         2020   I have pain when swallowing foods or liquids. No   I have heart burn, vomiting, or reflux. No   I have night cough or hiccups. No   I am making poor food choices (smoothies, shakes, chips). Yes, snacking salty things.  Like french fries, chips.  Snacks worse later at night.  Between lunch and dinner or after dinner.  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     I am unable to eat chicken, steak, and bread. No         2020   Are you pregnant?  No   Will you be traveling to remote areas? No   Will you be having major surgery soon? No     Has never been on weight loss medication in the past.    ROS:    General  Fatigue:   Sleep Quality:  HEENT  Hx of glaucoma: none  Vision changes: none  Cardiovascular  Chest Pain with Exertion: none  Palpitations: none  Hx of heart disease: none  HTN:none  Pulmonary  Shortness of breath at rest: none  Shortness of breath with exertion: none  Snoring: none  Gastrointestinal  Heartburn: none  Constipation:noen  Psychiatric  Moods Stable:yes  History of alcohol/drug abuse:none  Hx of eating disorder: noen  Endocrine  Polydipsia:   Thyroid disease:none  Neurologic:  Hx of seizures: none  Hx of paresthesias:none  Headaches:none    History of kidney stones: none  History of kidney disease: none  Current birth control: Nuvaring, boyfriend has vasectomy.     LABS:  Vitamin D Deficiency screening   Date Value Ref Range Status   01/14/2020 13 (L) 20 - 75 ug/L Final     Comment:     Season, race, dietary intake, and treatment affect the concentration of   25-hydroxy-Vitamin D. Values may decrease during winter months and increase   during summer months. Values 20-29 ug/L may indicate Vitamin D insufficiency   and values <20 ug/L may indicate Vitamin D deficiency.  Vitamin D determination is routinely performed by an immunoassay specific for   25 hydroxyvitamin D3.  If an individual is on vitamin D2 (ergocalciferol)   supplementation, please specify 25 OH vitamin D2 and D3 level determination by   LCMSMS test [id number].       Parathyroid Hormone Intact   Date Value Ref Range Status   01/14/2020 21 18 - 80 pg/mL Final     Vitamin B12   Date Value Ref Range Status   01/14/2020 414 193 - 986 pg/mL Final     Hemoglobin   Date Value Ref Range Status   01/14/2020 12.9 11.7 - 15.7 g/dL Final     Ferritin   Date Value Ref Range Status   01/14/2020 9 (L) 12 - 150 ng/mL Final     Iron   Date Value Ref Range Status   03/31/2014 87 35 - 180 ug/dL  "Final     Iron Binding Cap   Date Value Ref Range Status   03/31/2014 409 240 - 430 ug/dL Final     Iron Saturation Index   Date Value Ref Range Status   03/31/2014 21 15 - 46 % Final   07/09/2013 22 15 - 46 % Final   10/10/2011 24 15 - 46 % Final     ASSESSMENT AND PLAN:      1. Class 1 obesity due to excess calories without serious comorbidity with body mass index (BMI) of 32.0 to 32.9 in adult  Reviewed the role of pharmacological agents in the treatment of obesity and the \"off-label\" use of medications in this practice. Discussed indications and contraindications. Stressed medications are meant to work together with lifestyle changes such as improvements in diet choices, portion control and establishing and maintaining regular activity.   Phentermine was prescribed. Risks/benefits/possible side effects stated. All questions were answered. Written information was given.   - phentermine (ADIPEX-P) 37.5 MG tablet; Take 0.5 tablets (18.75 mg) by mouth every morning For 7 days, may increase to full tablet if tolerating after if needed.  Dispense: 15 tablet; Refill: 1    2. Bariatric surgery status  S/P Gastric Band    3. Fitting and adjustment of gastric lap band  After evaluation, we have elected to adjust her gastric band. Risk, benefits, and alternatives were reviewed before a consent was signed. Tram wishes to proceed and will follow up in 4 weeks for subsequent assessment.    PROCEDURE: Adjustment of gastric band performed by Vanessa Severino PA-C    PROCEDURE DETAILS: In the clinic exam room, the patient was placed in supine position on the exam table. The area over the access port was prepped with an alcohol swab, gloves were donned, and a Hurley needle and syringe were directed into the port under palpation guidance. A small amount of saline was aspirated to verify location, and 0.25 mls was delivered into the port. Access needle was withdrawn and bandaid was applied. Patient sat up and drank 4 ounces of " lukewarm water without difficulty. She should return to a liquid diet and advance as tolerated. Tight band warning signs were reviewed.  She left home in a stable and ambulatory condition.   - Lap Band Adjustment - Clinic    4. Vitamin D deficiency  Get 5000 international unit(s) of Vitamin D. Take 2 Tablets for next 2 months and we will recheck Vitamin D labs.    - Vitamin D Deficiency; Future    5. Iron deficiency  Your Iron levels are low. Please start either;     A. Ferrous Sulfate 325mg per day as well as vitamin C 500mg per day to help iron absorption or  B. Vitron C one tablet per day (iron and vitamin C in one tablet)  If you are already on one of the above then please increase by one tablet per day (not more than 3).    Recheck iron in 2-4 months.  - FERRITIN; Future     This was a 25 minute visit with greater than 50% spent on counseling.

## 2020-01-16 NOTE — PROGRESS NOTES
Order for:  phentermine (ADIPEX-P) 37.5 MG tablet 15 tablet 1 1/16/2020  --   Sig - Route: Take 0.5 tablets (18.75 mg) by mouth every morning For 7 days, may increase to full tablet if tolerating after if needed. - Oral     Called to [facility]'s pharmacy in [place] on [address]  Spoke with Ninoska, pharmacist.  Cynthia Zapata, MS, RD, RN

## 2020-01-16 NOTE — PATIENT INSTRUCTIONS
MEDICATION STARTED AT THIS APPOINTMENT    We are starting Phentermine. Take one tablet in the morning.  Call the nurse at [phone] if you have any questions or concerns. (Do not stop taking it if you don't think it's working. For some people it works without them knowing it.)    Phentermine is being prescribed because you identified hunger as one of the main causes for your extra weight.      Our patients on Phentermine find that they:    >feel less hunger    >find it easier to push the plate away   >have an easier time eating less    For some of our patients, these feelings are very real and immediate. For other patients, the feelings are less obvious. They don't feel much of a change but find they've lost weight. Like all weight loss medications, Phentermine  works best when you help it work. This means:  1. Having less tempting high calorie (fattening) food around the house or office. (For people with strong cravings this is very important.)   2. Staying away from situations or people that may trigger your cravings .   3. Eating out only one time or less each week.  4. Eating your meals at a table with the TV or computer off.    Side-effects. Phentermine is generally well tolerated. The main side-effects we see are feelings of racing pulse or rapid heart beat. Some people can get an elevated blood pressure. Because of this we may have you come back within a week or so of starting the medication for a blood pressure check.        Get 5000 international unit(s) of Vitamin D. Take 2 Tablets for next 2 months and we will recheck Vitamin D labs.      Your Iron levels are low. Please start either;     A. Ferrous Sulfate 325mg per day as well as vitamin C 500mg per day to help iron absorption or  B. Vitron C one tablet per day (iron and vitamin C in one tablet)  If you are already on one of the above then please increase by one tablet per day (not more than 3).      Follow up in 1 month.

## 2020-02-10 ENCOUNTER — HEALTH MAINTENANCE LETTER (OUTPATIENT)
Age: 35
End: 2020-02-10

## 2020-02-27 DIAGNOSIS — E66.811 CLASS 1 OBESITY DUE TO EXCESS CALORIES WITHOUT SERIOUS COMORBIDITY WITH BODY MASS INDEX (BMI) OF 32.0 TO 32.9 IN ADULT: ICD-10-CM

## 2020-02-27 DIAGNOSIS — E66.09 CLASS 1 OBESITY DUE TO EXCESS CALORIES WITHOUT SERIOUS COMORBIDITY WITH BODY MASS INDEX (BMI) OF 32.0 TO 32.9 IN ADULT: ICD-10-CM

## 2020-02-27 RX ORDER — PHENTERMINE HYDROCHLORIDE 37.5 MG/1
TABLET ORAL
Qty: 15 TABLET | OUTPATIENT
Start: 2020-02-27

## 2020-02-27 NOTE — TELEPHONE ENCOUNTER
Called pharmacy. Informed that pt did fill both supplies of this rx.  Order states pt may increase dose to 1 full tab if needed.    Called pt to verify usage.  Pt states she did increase to 1 tab/day and is almost out; only has enough for tomorrow morning.  States med is effective and working well.  Will need refill by tomorrow.    Will forward to GABRIELLA Handley RN on 2/27/2020 at 10:35 AM

## 2020-02-28 NOTE — TELEPHONE ENCOUNTER
Called patient to inform her that she needs appointment for refill of Phentermine.  Patient N/Z.  LM for patient to call clinic.  Cynthia Zapata, MS, RD, RN

## 2020-02-28 NOTE — TELEPHONE ENCOUNTER
Reason for call:  Other   Patient called regarding (reason for call): call back  Additional comments: Patient states that she has appointment scheduled for Thursday 03/05/2020, but she will be out of her medication after today 02/28, wondering if she could get it refilled before she comes in. Please call to follow up. Thanks!    Phone number to reach patient:  Cell number on file:    Telephone Information:   Mobile [phone]       Best Time:  anytime    Can we leave a detailed message on this number?  YES    Travel screening: Not Applicable

## 2020-03-02 NOTE — TELEPHONE ENCOUNTER
Called pt back.  Updated that per PA she needs to be seen for refill.  Pt has appt for this week. No further needs at this time.  Angelina Handley RN on 3/2/2020 at 1:49 PM

## 2020-03-04 ENCOUNTER — TELEPHONE (OUTPATIENT)
Dept: SURGERY | Facility: CLINIC | Age: 35
End: 2020-03-04

## 2020-03-04 NOTE — TELEPHONE ENCOUNTER
lvm reminding pt to fill out questionnaires in [facility] prior to tomorrow appt. When in [facility], click the yellow health tab in the upper left corner, then questionnaires.    Maria Fernanda Harper MA

## 2020-03-05 ENCOUNTER — OFFICE VISIT (OUTPATIENT)
Dept: SURGERY | Facility: CLINIC | Age: 35
End: 2020-03-05
Payer: COMMERCIAL

## 2020-03-05 VITALS — WEIGHT: 183.7 LBS | HEIGHT: 64 IN | BODY MASS INDEX: 31.36 KG/M2

## 2020-03-05 DIAGNOSIS — K91.2 POSTSURGICAL MALABSORPTION: ICD-10-CM

## 2020-03-05 DIAGNOSIS — Z46.51 FITTING AND ADJUSTMENT OF GASTRIC LAP BAND: Primary | ICD-10-CM

## 2020-03-05 DIAGNOSIS — Z98.84 BARIATRIC SURGERY STATUS: ICD-10-CM

## 2020-03-05 DIAGNOSIS — E61.1 IRON DEFICIENCY: ICD-10-CM

## 2020-03-05 DIAGNOSIS — E66.3 OVERWEIGHT: ICD-10-CM

## 2020-03-05 DIAGNOSIS — E55.9 VITAMIN D DEFICIENCY: ICD-10-CM

## 2020-03-05 PROCEDURE — S2083 ADJUSTMENT GASTRIC BAND: HCPCS | Performed by: PHYSICIAN ASSISTANT

## 2020-03-05 PROCEDURE — 99214 OFFICE O/P EST MOD 30 MIN: CPT | Mod: 25 | Performed by: PHYSICIAN ASSISTANT

## 2020-03-05 RX ORDER — PHENTERMINE HYDROCHLORIDE 30 MG/1
30 CAPSULE ORAL EVERY MORNING
Qty: 30 CAPSULE | Refills: 1 | Status: SHIPPED | OUTPATIENT
Start: 2020-03-05 | End: 2020-10-09

## 2020-03-05 RX ORDER — TOPIRAMATE 25 MG/1
TABLET, FILM COATED ORAL
Qty: 60 TABLET | Refills: 1 | Status: SHIPPED | OUTPATIENT
Start: 2020-03-05 | End: 2020-10-09

## 2020-03-05 ASSESSMENT — MIFFLIN-ST. JEOR: SCORE: 1513.26

## 2020-03-05 NOTE — PATIENT INSTRUCTIONS
Get 5000 international unit(s) of Vitamin D.   Take 2 Tablets for next 2 months and we will recheck Vitamin D labs.     MEDICATION STARTED AT THIS APPOINTMENT  We are starting topiramate at bedtime.  Start one tab, 25 mg, for a week. Go up to 50 mg (2 tabs) for the next week. Stay at 2 tabs until you are seen again. Call the nurse at [phone] if you have any questions or concerns. (Do not stop taking it if you don't think it's working. For some people it works even though they do not feel much different.)    Topiramate (Topamax) is a medication that is used most often to treat migraine headaches or for seizures. It has also been found to help with weight loss. Although it's not currently FDA approved for weight loss, it has been used safely for a number of years to help people who are carrying extra weight.     Just how topiramate helps with weight loss has not been exactly determined. However it seems to work on areas of the brain to quiet down signals related to eating.      Topiramate may make you:    >feel less interest in eating in between meals   >think less about food and eating   >find it easier to push the plate away   >find giving up pop easier    >have an easier time eating less    For some of our patients, the pills work right away. They feel and think quite differently about food. Other patients don't feel much of a change but find in fact they have lost weight! Like all weight loss medications, topiramate works best when you help it work.  This means:    1) Have less tempting high calorie (fattening) food around the house or office    2) Have lower calorie food (fruits, vegetables,low fat meats and dairy) for snacks    3) Eat out only one time or less each week.   4) Eat your meals at a table with the TV or computer off.    Side-effects. Topiramate is generally well tolerated. The main side-effects we see are:   Tingling in hands,feet, or face (usually not very troublesome)   Mental confusion and  word finding trouble (about 10% of patients have this.)     Feeling sleepy or a bit dopey- this goes away very soon after starting.    One of the dangers of topiramate is the possibility of birth defects--if you get pregnant when you are on it, there is the risk that your baby will be born with a cleft lip or palate.  If you are on topiramate and of child bearing age, you need to be on a reliable form of birth control or refrain from sexual intercourse.     Please refer to the pharmacy insert for more information on side-effects. Since many pharmacists are not familiar with the use of topiramate in weight loss, calling the clinic will get you the most accurate information on the use of this medication for weight loss.

## 2020-03-05 NOTE — PROGRESS NOTES
FRANTZ Raman PA-C.  Call in order for Phentermine 30 mg capsule #30 1 RF.   Order called to [facility] and spoke with Ninoska, pharmacist.  Cynthia Zapata, MS, RD, RN

## 2020-03-05 NOTE — PROGRESS NOTES
Band Assessment Visit    Date: 3/5/2020  Name: Tram Bal  MR#: [id number]  : 1985    VITALS:          Weight: 183 lbs 11.2 oz         BMI: Body mass index is 31.53 kg/m .         Wt change since last visit (lbs): -3.2         Cumulative weight loss (lbs): 29.3    SUBJECTIVE:  Patient comes to the clinic today for band assessment and obesity medication management.  In regards to the patient's band, the patient feels they need fluid added to their band.     WEIGHT SELF ASSESSMENT:    3/5/2020   Are you satisfied with your weight or weight loss? No   Please check the boxes (1-3 boxes) which you think have been influencing your weight in the past month. The band is not in the right setting     Interval Hx.     Pt is currently taking 37.5 mg of Phentermine. Feels a little anxious when taking the medication.  Has a lot of energy.  Is able to go to the gym daily.  Does not feel she is losing a lot of weight. Is getting stronger.  Medication helps her decrease her thoughts about food in afternoon and evening.  Snacking is less during these times.   Has no other adverse symptoms.    CURRENT EXERCISE AND ACTIVITY:    3/5/2020   I am exercising 3x weekly or more.  Yes   Please select the statement that best describes your ability to exercise and be active in the past 4 weeks or since your last clinic visit: I am as active as I can possibly be       BAND ROS:    3/5/2020   I am hungry between meals? No   I am eating between meals? No   I am eating > 1 cup of food at meals? Yes   I am not losing 1-2 lbs a week? Yes   I do not feel a sense of restriction? Yes, not in the green zone         3/5/2020   I have pain when swallowing foods or liquids. No   I have heart burn, vomiting, or reflux. No   I have night cough or hiccups. No   I am making poor food choices (smoothies, shakes, chips). No   I am unable to eat chicken, steak, and bread. No         3/5/2020   Are you pregnant? No   Will you be traveling to remote  areas? No   Will you be having major surgery soon? No     Will you be traveling to remote areas? No   Will you be having major surgery soon? No       ROS:  General  Fatigue: none  Sleep Quality:good  HEENT  Hx of glaucoma: none  Vision changes: none  Cardiovascular  Chest Pain with Exertion: none  Palpitations: none  Hx of heart disease: none  HTN:none  Pulmonary  Shortness of breath at rest: none  Shortness of breath with exertion: none  Snoring: none  Gastrointestinal  Heartburn: none  Constipation:none  Psychiatric  Moods Stable:yes  History of alcohol/drug abuse:none  Hx of eating disorder: none  Endocrine  Polydipsia: none  Thyroid disease:none  Neurologic:  Hx of seizures: none  Hx of paresthesias:none  Headaches:none    History of kidney stones: none  History of kidney disease: none  Current birth control: Nuvaring, boyfriend has vasectomy.     LABS:  Vitamin D Deficiency screening   Date Value Ref Range Status   01/14/2020 13 (L) 20 - 75 ug/L Final     Comment:     Season, race, dietary intake, and treatment affect the concentration of   25-hydroxy-Vitamin D. Values may decrease during winter months and increase   during summer months. Values 20-29 ug/L may indicate Vitamin D insufficiency   and values <20 ug/L may indicate Vitamin D deficiency.  Vitamin D determination is routinely performed by an immunoassay specific for   25 hydroxyvitamin D3.  If an individual is on vitamin D2 (ergocalciferol)   supplementation, please specify 25 OH vitamin D2 and D3 level determination by   LCMSMS test [id number].       Parathyroid Hormone Intact   Date Value Ref Range Status   01/14/2020 21 18 - 80 pg/mL Final     Vitamin B12   Date Value Ref Range Status   01/14/2020 414 193 - 986 pg/mL Final     Hemoglobin   Date Value Ref Range Status   01/14/2020 12.9 11.7 - 15.7 g/dL Final     Ferritin   Date Value Ref Range Status   01/14/2020 9 (L) 12 - 150 ng/mL Final     Iron   Date Value Ref Range Status   03/31/2014 87 35 -  "180 ug/dL Final     Iron Binding Cap   Date Value Ref Range Status   03/31/2014 409 240 - 430 ug/dL Final     Iron Saturation Index   Date Value Ref Range Status   03/31/2014 21 15 - 46 % Final   07/09/2013 22 15 - 46 % Final   10/10/2011 24 15 - 46 % Final     LABS:  Vitamin D Deficiency screening   Date Value Ref Range Status   01/14/2020 13 (L) 20 - 75 ug/L Final     Comment:     Season, race, dietary intake, and treatment affect the concentration of   25-hydroxy-Vitamin D. Values may decrease during winter months and increase   during summer months. Values 20-29 ug/L may indicate Vitamin D insufficiency   and values <20 ug/L may indicate Vitamin D deficiency.  Vitamin D determination is routinely performed by an immunoassay specific for   25 hydroxyvitamin D3.  If an individual is on vitamin D2 (ergocalciferol)   supplementation, please specify 25 OH vitamin D2 and D3 level determination by   LCMSMS test [id number].       Parathyroid Hormone Intact   Date Value Ref Range Status   01/14/2020 21 18 - 80 pg/mL Final     Vitamin B12   Date Value Ref Range Status   01/14/2020 414 193 - 986 pg/mL Final     Hemoglobin   Date Value Ref Range Status   01/14/2020 12.9 11.7 - 15.7 g/dL Final     Ferritin   Date Value Ref Range Status   01/14/2020 9 (L) 12 - 150 ng/mL Final     Iron   Date Value Ref Range Status   03/31/2014 87 35 - 180 ug/dL Final     Iron Binding Cap   Date Value Ref Range Status   03/31/2014 409 240 - 430 ug/dL Final     Iron Saturation Index   Date Value Ref Range Status   03/31/2014 21 15 - 46 % Final   07/09/2013 22 15 - 46 % Final   10/10/2011 24 15 - 46 % Final     ASSESSMENT AND PLAN:      1. Overweight  Reviewed the role of pharmacological agents in the treatment of obesity and the \"off-label\" use of medications in this practice. Discussed indications and contraindications. Stressed medications are meant to work together with lifestyle changes such as improvements in diet choices, portion control and " establishing and maintaining regular activity.   We decreased her Phentermine dose to 30 mg and added and Topiramate 50 mg. Risks/benefits/possible side effects stated. All questions were answered. Written information was given.   - phentermine (ADIPEX-P) 30 MG capsule; Take 1 capsule (30 mg) by mouth every morning  Dispense: 30 capsule; Refill: 1  - Topiramate (TOPAMAX) 25 MG tablet; Take 1 tablet for 1 week, then increase to two tablets at night.  Dispense: 60 tablet; Refill: 1    2. Bariatric surgery status  S/P Gastric Band    3. Fitting and adjustment of gastric lap band  After evaluation, we have elected to adjust her gastric band. Risk, benefits, and alternatives were reviewed before a consent was signed. Tram wishes to proceed and will follow up in 4 weeks for subsequent assessment.    PROCEDURE: Adjustment of gastric band performed by Vanessa Severino PA-C    PROCEDURE DETAILS: In the clinic exam room, the patient was placed in supine position on the exam table. The area over the access port was prepped with an alcohol swab, gloves were donned, and a Hurley needle and syringe were directed into the port under palpation guidance. A small amount of saline was aspirated to verify location, and 0.25 mls was delivered into the port. Access needle was withdrawn and bandaid was applied. Patient sat up and drank 4 ounces of lukewarm water without difficulty. She should return to a liquid diet and advance as tolerated. Tight band warning signs were reviewed.  She left home in a stable and ambulatory condition.   - Lap Band Adjustment - Clinic    4. Vitamin D deficiency  Get 5000 international unit(s) of Vitamin D. Take 2 Tablets for next 2 months and we will recheck Vitamin D labs.    - Vitamin D Deficiency; Future  PTH; ; Future    5. Iron deficiency  Continue current iron supplement.  Recheck iron in 2-4 months.  - FERRITIN; Future     Follow up: Return to clinic in 4-6 weeks for band assessment and obesity  medication management.      This was a 25 minute visit with greater than 50% spent on counseling.

## 2020-03-15 ENCOUNTER — MYC MEDICAL ADVICE (OUTPATIENT)
Dept: SURGERY | Facility: CLINIC | Age: 35
End: 2020-03-15

## 2020-03-16 NOTE — TELEPHONE ENCOUNTER
Called pt to check on extent of symptoms.      No answer; left message asking pt to call back.  Also sent repeat [employer] message.  Angelina Handley RN on 3/16/2020 at 8:31 AM

## 2020-04-16 ENCOUNTER — E-VISIT (OUTPATIENT)
Dept: FAMILY MEDICINE | Facility: CLINIC | Age: 35
End: 2020-04-16
Payer: COMMERCIAL

## 2020-04-16 DIAGNOSIS — J06.9 VIRAL URI: Primary | ICD-10-CM

## 2020-04-16 PROCEDURE — 99207 ZZC NO BILLABLE SERVICE THIS VISIT: CPT | Performed by: NURSE PRACTITIONER

## 2020-10-09 ENCOUNTER — NURSE TRIAGE (OUTPATIENT)
Dept: NURSING | Facility: CLINIC | Age: 35
End: 2020-10-09

## 2020-10-09 ENCOUNTER — VIRTUAL VISIT (OUTPATIENT)
Dept: FAMILY MEDICINE | Facility: CLINIC | Age: 35
End: 2020-10-09
Payer: COMMERCIAL

## 2020-10-09 DIAGNOSIS — J06.9 VIRAL URI WITH COUGH: Primary | ICD-10-CM

## 2020-10-09 PROCEDURE — 99213 OFFICE O/P EST LOW 20 MIN: CPT | Mod: 95 | Performed by: NURSE PRACTITIONER

## 2020-10-09 RX ORDER — ETONOGESTREL AND ETHINYL ESTRADIOL VAGINAL RING .015; .12 MG/D; MG/D
RING VAGINAL
Qty: 9 EACH | Refills: 0 | Status: CANCELLED | OUTPATIENT
Start: 2020-10-09

## 2020-10-09 ASSESSMENT — PATIENT HEALTH QUESTIONNAIRE - PHQ9: SUM OF ALL RESPONSES TO PHQ QUESTIONS 1-9: 0

## 2020-10-09 NOTE — TELEPHONE ENCOUNTER
Pt calling to schedule her covid test, call transferred to scheduling.  Leanna Obando RN  [facility]    Additional Information    Negative: [1] Caller is not with the adult (patient) AND [2] reporting urgent symptoms    Negative: Lab result questions    Negative: Medication questions    Negative: Caller can't be reached by phone    Negative: Caller has already spoken to PCP or another triager    Negative: RN needs further essential information from caller in order to complete triage    Negative: Requesting regular office appointment    Negative: [1] Caller requesting NON-URGENT health information AND [2] PCP's office is the best resource    Health Information question, no triage required and triager able to answer question    Protocols used: INFORMATION ONLY CALL-A-AH

## 2020-10-09 NOTE — PATIENT INSTRUCTIONS
"Discharge Instructions for COVID-19 Patients  You have--or may have--COVID-19. Please follow the instructions listed below.   If you have a weakened immune system, discuss with your doctor any other actions you need to take.  How can I protect others?  If you have symptoms (fever, cough, body aches or trouble breathing):    Stay home and away from others (self-isolate) until:  ? At least 10 days have passed since your symptoms started, And   ? You've had no fever--and no medicine that reduces fever--for 1 full day (24 hours), And    ? Your other symptoms have resolved (gotten better).  If you don't show symptoms, but testing showed that you have COVID-19:    Stay home and away from others (self-isolate). Follow the tips under \"How do I self-isolate?\" below for 10 days (20 days if you have a weak immune system).    You don't need to be retested for COVID-19 before going back to school or work. As long as you're fever-free and feeling better, you can go back to school, work and other activities after waiting the 10 or 20 days.   How do I self-isolate?    Stay in your own room, even for meals. Use your own bathroom if you can.    Stay away from others in your home. No hugging, kissing or shaking hands. No visitors.    Don't go to work, school or anywhere else.    Clean \"high touch\" surfaces often (doorknobs, counters, handles). Use household cleaning spray or wipes. You'll find a full list of  on the EPA website: www.epa.gov/pesticide-registration/list-n-disinfectants-use-against-sars-cov-2.    Cover your mouth and nose with a mask or other face covering to avoid spreading germs.    Wash your hands and face often. Use soap and water.    Caregivers in these groups are at risk for severe illness due to COVID-19:  ? People 65 years and older  ? People who live in a nursing home or long-term care facility  ? People with chronic disease (lung, heart, cancer, diabetes, kidney, liver, immunologic)  ? People who have a " weakened immune system, including those who:    Are in cancer treatment    Take medicine that weakens the immune system, such as corticosteroids    Had a bone marrow or organ transplant    Have an immune deficiency    Have poorly controlled HIV or AIDS    Are obese (body mass index of 40 or higher)    Smoke regularly    Caregivers should wear gloves while washing dishes, handling laundry and cleaning bedrooms and bathrooms.    Use caution when washing and drying laundry: Don't shake dirty laundry and use the warmest water setting that you can.    For more tips on managing your health at home, go to www.cdc.gov/coronavirus/2019-ncov/downloads/10Things.pdf.  How can I take care of myself at home?  1. Get lots of rest. Drink extra fluids (unless a doctor has told you not to).    2. Take Tylenol (acetaminophen) for fever or pain. If you have liver or kidney problems, ask your family doctor if it's okay to take Tylenol.     Adults can take either:  ? 650 mg (two 325 mg pills) every 4 to 6 hours, or   ? 1,000 mg (two 500 mg pills) every 8 hours as needed.  ? Note: Don't take more than 3,000 mg in one day. Acetaminophen is found in many medicines (both prescribed and over-the-counter medicines). Read all labels to be sure you don't take too much.   For children, check the Tylenol bottle for the right dose. The dose is based on the child's age or weight.  3. If you have other health problems (like cancer, heart failure, an organ transplant or severe kidney disease): Call your specialty clinic if you don't feel better in the next 2 days.    4. Know when to call 911. Emergency warning signs include:  ? Trouble breathing or shortness of breath  ? Pain or pressure in the chest that doesn't go away  ? Feeling confused like you haven't felt before, or not being able to wake up  ? Bluish-colored lips or face    5. Your doctor may have prescribed a blood thinner medicine. Follow their instructions.  Where can I get more  information?    [facility] - About COVID-19: [email or web address]    CDC - What to Do If You're Sick: www.cdc.gov/coronavirus/2019-ncov/about/steps-when-sick.html    CDC - Ending Home Isolation: www.cdc.gov/coronavirus/2019-ncov/hcp/disposition-in-home-patients.html    CDC - Caring for Someone: www.cdc.gov/coronavirus/2019-ncov/if-you-are-sick/care-for-someone.html    [facility] - Interim Guidance for Hospital Discharge to Home: www.health.[facility].[place]./diseases/coronavirus/hcp/hospdischarge.pdf    [facility] clinical trials (COVID-19 research studies): clinicalaffairs.[facility]-clinical-trials    Below are the COVID-19 hotlines at the [place] Department of [facility] ([facility]). Interpreters are available.  ? For health questions: Call 337-693-1027 or 1-653.305.8322 (7 a.m. to 7 p.m.)  ? For questions about schools and childcare: Call 202-711-1407 or 1-960.545.5822 (7 a.m. to 7 p.m.)    For informational purposes only. Not to replace the advice of your health care provider. Clinically reviewed by the Infection Prevention Team. Copyright   2020 [employer]. All rights reserved. [employer] [id number] - REV 08/04/20.

## 2020-10-10 ENCOUNTER — APPOINTMENT (OUTPATIENT)
Dept: GENERAL RADIOLOGY | Facility: CLINIC | Age: 35
End: 2020-10-10
Attending: EMERGENCY MEDICINE
Payer: COMMERCIAL

## 2020-10-10 ENCOUNTER — HOSPITAL ENCOUNTER (EMERGENCY)
Facility: CLINIC | Age: 35
Discharge: HOME | End: 2020-10-10
Attending: EMERGENCY MEDICINE | Admitting: EMERGENCY MEDICINE
Payer: COMMERCIAL

## 2020-10-10 VITALS
BODY MASS INDEX: 30.9 KG/M2 | TEMPERATURE: 98 F | HEART RATE: 93 BPM | DIASTOLIC BLOOD PRESSURE: 86 MMHG | WEIGHT: 180 LBS | SYSTOLIC BLOOD PRESSURE: 126 MMHG | RESPIRATION RATE: 16 BRPM | OXYGEN SATURATION: 98 %

## 2020-10-10 DIAGNOSIS — J20.9 ACUTE BRONCHITIS, UNSPECIFIED ORGANISM: ICD-10-CM

## 2020-10-10 DIAGNOSIS — R05.9 COUGH: ICD-10-CM

## 2020-10-10 PROCEDURE — C9803 HOPD COVID-19 SPEC COLLECT: HCPCS

## 2020-10-10 PROCEDURE — 99214 OFFICE O/P EST MOD 30 MIN: CPT | Performed by: EMERGENCY MEDICINE

## 2020-10-10 PROCEDURE — 71045 X-RAY EXAM CHEST 1 VIEW: CPT

## 2020-10-10 PROCEDURE — U0003 INFECTIOUS AGENT DETECTION BY NUCLEIC ACID (DNA OR RNA); SEVERE ACUTE RESPIRATORY SYNDROME CORONAVIRUS 2 (SARS-COV-2) (CORONAVIRUS DISEASE [COVID-19]), AMPLIFIED PROBE TECHNIQUE, MAKING USE OF HIGH THROUGHPUT TECHNOLOGIES AS DESCRIBED BY CMS-2020-01-R: HCPCS | Performed by: EMERGENCY MEDICINE

## 2020-10-10 PROCEDURE — G0463 HOSPITAL OUTPT CLINIC VISIT: HCPCS | Mod: 25 | Performed by: EMERGENCY MEDICINE

## 2020-10-10 RX ORDER — AZITHROMYCIN 250 MG/1
TABLET, FILM COATED ORAL
Qty: 6 TABLET | Refills: 0 | Status: SHIPPED | OUTPATIENT
Start: 2020-10-10 | End: 2020-10-15

## 2020-10-10 NOTE — ED PROVIDER NOTES
"  History     Chief Complaint   Patient presents with     Cough     cough \" i think i have bronchitis\" ongoing since thursday. Nonsmoker.      HPI  Tram Bal is a 35 year old female with a past medical history significant for iron deficiency anemia history of bariatric surgery, anxiety disorder and depression who presents to the emergency department complaining of cough congestion and concern of bronchitis.  Patient states she has had symptoms for the past 3 days.  She has had a productive cough with green sputum.  She thinks she has had a low-grade fever and had some ear pain and nasal drainage.  She denies any chest pain or significant short of breath.  She is not any headaches or visual changes but had some generalized body aches.  She has been taking ibuprofen or Tylenol for pain.  She denies any nausea vomiting or diarrhea.  He has not had any urinary symptoms and denies any chance of pregnancy.    Allergies:  Allergies   Allergen Reactions     Famvir [Famciclovir] Hives       Problem List:    Patient Active Problem List    Diagnosis Date Noted     Iron deficiency 01/16/2020     Priority: Medium     Vitamin D deficiency 10/28/2018     Priority: Medium     Mild major depression (H) 10/28/2018     Priority: Medium     Class 1 obesity due to excess calories without serious comorbidity with body mass index (BMI) of 32.0 to 32.9 in adult 08/07/2018     Priority: Medium     Bariatric surgery status 01/10/2017     Priority: Medium     Overweight (BMI 25.0-29.9) 01/10/2017     Priority: Medium     Generalized anxiety disorder 03/29/2016     Priority: Medium     Routine general medical examination at a health care facility 02/20/2013     Priority: Medium     Encounter for routine gynecological examination 02/20/2013     Priority: Medium     Problem list name updated by automated process. Provider to review       Encounter for other general counseling or advice on contraception 02/20/2013     Priority: Medium     " Diagnosis updated by automated process. Provider to review and confirm.       Tobacco abuse, in remission 07/13/2011     Priority: Medium     LAP-BAND surgery status 05/16/2011     Priority: Medium     CARDIOVASCULAR SCREENING; LDL GOAL LESS THAN 160 10/31/2010     Priority: Medium     Displacement of lumbar intervertebral disc 05/15/2008     Priority: Medium     Carrier or suspected carrier of group B Streptococcus 08/20/2007     Priority: Medium     LABOR PROTOCOL       Encounter for supervision of other normal pregnancy 03/09/2007     Priority: Medium     Diagnosis updated by automated process. Provider to review and confirm.       Cervical high risk HPV (human papillomavirus) test positive 08/22/2005     Priority: Medium     7/04: LSIL, 10/04: [facility]- JARETH I  4/05: LSIL. 6/05: [facility] - JARETH II & III  8/05: LEEP - JARETH III  12/05: NIL pap  4/06: ASCUS, neg HPV  NIL paps: 10/06, 11/07, 12/08  1/10: ASCUS, neg HPV  NIL paps: 2/11, 4/10/12 pap NIL  2/20/13 pap NIL  3/31/14 pap NIL/neg HPV. Plan-- repeat pap/HPV in 1 year. If NIL/neg HPV again at that time, patient will be able to go to q 3 yr testing.  4/20/15 pap NIL/neg HPV. Plan: repeat pap and HPV testing in 3 years.  4/25/16: Pap - ASCUS, Neg HPV. Cotest in 1 year.   4/28/17:Pap: ASCUS, +High Risk HPV (Neg 16/18).  Plan [facility]-  5/22/17:[facility]- Benign.  Pap: ASCUS, +HR HPV (Not types 16/18). Plan cotest in 1 year.   10/26/18 NIL Pap, + HR HPV (Neg 16/18). Plan colp  4/1/19 [facility]: ECC - atypia, favor reactive change.  NIL pap, + HR HPV (not 16 or 18). Plan: cotest in 1 yr ([facility])  4/10/19 [place]. Liang. ([facility] [place]  4/3/20 Due to COVID restrictions - Routed to RN to review recommendations (rlm)  4/9/20 COVID 19 interim plan updated to: Plan unchanged. (angelita) CCT Tracking.         Screening examination for pulmonary tuberculosis 04/07/2005     Priority: Medium        Past Medical History:    Past Medical History:   Diagnosis Date     ASCUS favor benign 4/25/16     ASCUS  with positive high risk HPV cervical 04/28/2017     Cervical high risk HPV (human papillomavirus) test positive 10/26/2018     Depressive disorder 2016     Displacement of lumbar intervertebral disc 5/15/2008     Esophageal reflux      Hx of colposcopy with cervical biopsy 05/22/2017     Papanicolaou smear of cervix with low grade squamous intraepithelial lesion (LGSIL) 7/2004, 4/2005       Past Surgical History:    Past Surgical History:   Procedure Laterality Date     COLPOSCOPY,LOOP ELECTRD CERVIX EXCIS  08/11/2005    JARETH III     GI SURGERY  04/14/11    LAP BANDING for obesity, pre-DM     ZZC NONSPECIFIC PROCEDURE  1999    Oral surgery       Family History:    Family History   Problem Relation Age of Onset     Thyroid Disease Mother      Unknown/Adopted Mother      Hyperlipidemia Father      Hypertension Paternal Grandmother      Hyperlipidemia Paternal Grandmother      Diabetes Paternal Aunt      Hypertension Paternal Aunt      Lipids Paternal Aunt      Thyroid Disease Maternal Half-Brother      Unknown/Adopted No family hx of         unaware of maternal grandparents history as pt mom was adopted     Breast Cancer No family hx of      Cancer - colorectal No family hx of      Gynecology No family hx of         no ovarian cancer     Coronary Artery Disease No family hx of      Cerebrovascular Disease No family hx of      Thyroid Disease No family hx of      Osteoporosis No family hx of        Social History:  Marital Status:  Single [1]  Social History     Tobacco Use     Smoking status: Former Smoker     Packs/day: 0.50     Years: 3.00     Pack years: 1.50     Quit date: 5/1/2010     Years since quitting: 10.4     Smokeless tobacco: Never Used     Tobacco comment: quit smoking may 2010    Substance Use Topics     Alcohol use: Yes     Comment: occasionally     Drug use: No        Medications:         acyclovir (ZOVIRAX) 400 MG tablet       etonogestrel-ethinyl estradiol (NUVARING) 0.12-0.015 MG/24HR vaginal  ring       Multiple Vitamins-Iron TABS          Review of Systems   As per HPI.  Physical Exam   BP: 126/86  Pulse: 93  Temp: 98  F (36.7  C)  Resp: 16  Weight: 81.6 kg (180 lb)  SpO2: 98 %      Physical Exam  Vitals signs and nursing note reviewed.   Constitutional:       General: She is not in acute distress.     Appearance: Normal appearance. She is not ill-appearing, toxic-appearing or diaphoretic.   HENT:      Head: Normocephalic and atraumatic.      Ears:      Comments: TMs with fluid behind ears bilaterally no erythema or dullness or significant bulging present.  External canals without erythema edema.     Nose:      Comments: L nasal congestion.     Mouth/Throat:      Mouth: Mucous membranes are moist.      Pharynx: Oropharynx is clear. No oropharyngeal exudate or posterior oropharyngeal erythema.   Eyes:      Conjunctiva/sclera: Conjunctivae normal.      Pupils: Pupils are equal, round, and reactive to light.   Neck:      Musculoskeletal: Normal range of motion and neck supple.      Vascular: No carotid bruit.   Cardiovascular:      Rate and Rhythm: Normal rate and regular rhythm.      Pulses: Normal pulses.      Heart sounds: Normal heart sounds. No murmur.   Pulmonary:      Effort: Pulmonary effort is normal.      Breath sounds: No wheezing, rhonchi or rales.      Comments: Breath sounds slightly decreased at bases.  Chest:      Chest wall: No tenderness.   Musculoskeletal: Normal range of motion.   Lymphadenopathy:      Cervical: No cervical adenopathy.   Skin:     General: Skin is warm and dry.      Findings: No rash.   Neurological:      General: No focal deficit present.      Mental Status: She is alert.      Motor: No weakness.      Coordination: Coordination normal.   Psychiatric:         Mood and Affect: Mood normal.         ED Course        Procedures               Critical Care time:  none               No results found for this or any previous visit (from the past 24 hour(s)).    Medications -  No data to display  Results for orders placed or performed during the hospital encounter of 10/10/20   XR Chest Port 1 View    Narrative    XR CHEST PORT 1 VW 10/10/2020 2:17 PM    HISTORY: cough    COMPARISON: Chest x-ray 4/11/2011      Impression    IMPRESSION: The cardiac silhouette and pulmonary vasculature are  within normal limits. No focal pulmonary consolidations. No pleural  effusion or pneumothorax. A laparoscopic adjustable band has been  placed since 4/11/2011 and appears within normal limits.    TEODORO WESTON MD       Assessments & Plan (with Medical Decision Making) records were reviewed.  A chest x-ray was obtained and patient was given Covid swab.  Chest x-ray without obvious acute abnormality.  Patient's vital signs are stable saturations in the upper 90s I considered a PE but do not think patient has a PA as she is not tachycardic not tachypneic is satting well.  She is not short of breath significantly.  This appears more than likely to be a viral illness.  I have advised patient to try over-the-counter decongestants and cough medications for the next few days.  We will wait culture results if symptoms worsen including fevers worsening cough she should start the Z-Dylan and if any worsening symptoms should return for further evaluation and care.  Patient is comfortable going home at this time.  She understands she needs to quarantine until results of her Covid test have returned.     I have reviewed the nursing notes.    I have reviewed the findings, diagnosis, plan and need for follow up with the patient.       Discharge Medication List as of 10/10/2020  2:48 PM      START taking these medications    Details   azithromycin (ZITHROMAX Z-DYLAN) 250 MG tablet Two tablets on the first day, then one tablet daily for the next 4 days, Disp-6 tablet, R-0, Local Print             Final diagnoses:   Cough   Acute bronchitis, unspecified organism - probable       10/10/2020   [facility]  EMERGENCY DEPT     Tj Viveros MD  10/12/20 1257

## 2020-10-10 NOTE — ED AVS SNAPSHOT
[facility] Emergency Dept  [address] [address]  Phone: [phone]  Fax: [phone]                                    Tram Bal   MRN: [id number]    Department: [facility] Emergency Dept   Date of Visit: 10/10/2020           After Visit Summary Signature Page    I have received my discharge instructions, and my questions have been answered. I have discussed any challenges I see with this plan with the nurse or doctor.    ..........................................................................................................................................  Patient/Patient Representative Signature      ..........................................................................................................................................  Patient Representative Print Name and Relationship to Patient    ..................................................               ................................................  Date                                   Time    ..........................................................................................................................................  Reviewed by Signature/Title    ...................................................              ..............................................  Date                                               Time          22EPIC Rev 08/18

## 2020-10-10 NOTE — DISCHARGE INSTRUCTIONS
Return if symptoms worsen or new symptoms develop.  Isolate yourself until call bed test returns you will be called with results.  Drink plenty of fluids.  Take over-the-counter decongestant and cough medication.  If symptoms do not improve over the next few days began Z-Olu.  If any worsening cough shortness of breath fevers not controlled with ibuprofen Tylenol or other symptoms present please return for further evaluation and care.

## 2020-10-11 ENCOUNTER — TELEPHONE (OUTPATIENT)
Dept: EMERGENCY MEDICINE | Facility: CLINIC | Age: 35
End: 2020-10-11

## 2020-10-11 LAB
SARS-COV-2 RNA SPEC QL NAA+PROBE: ABNORMAL
SPECIMEN SOURCE: ABNORMAL

## 2020-10-11 NOTE — TELEPHONE ENCOUNTER
Coronavirus (COVID-19) Notification     Reason for call  Patient requesting results     Lab Result    Lab test 2019-nCoV rRt-PCR in process        RN Recommendations/Instructions per [facility]  Continue quarantee and following instructions until you receive the results     Please Contact your PCP clinic or return to the Emergency department if your:    Symptoms worsen or other concerning symptom's.     Patient informed that if test for COVID19 is POSITIVE,  you will receive a call typically within 48 hours from the test date (date lab collected).  If NEGATIVE result, you will receive a letter in the mail or [employer].      [RN/LPN Name]  Parish Diaz RN  [facility]  Emergency Dept Lab Result RN  Ph# [phone]

## 2020-10-12 ENCOUNTER — PATIENT OUTREACH (OUTPATIENT)
Dept: NURSING | Facility: CLINIC | Age: 35
End: 2020-10-12
Payer: COMMERCIAL

## 2020-10-12 DIAGNOSIS — U07.1 2019 NOVEL CORONAVIRUS DISEASE (COVID-19): Primary | ICD-10-CM

## 2020-10-12 ASSESSMENT — ACTIVITIES OF DAILY LIVING (ADL): DEPENDENT_IADLS:: INDEPENDENT

## 2020-10-12 NOTE — PROGRESS NOTES
Clinic Care Coordination Contact    Clinic Care Coordination Contact  OUTREACH    Referral Information:  Referral Source: ED Follow-Up    Primary Diagnosis: Respiratory Disorders - other    Chief Complaint   Patient presents with     Clinic Care Coordination - Post Hospital     Care Team        [place] Utilization: Patient identified for Care Coordination follow up due to:  Hospitalization:   NO  Emergency Department utilization risk:  YES Covid  Fall risk:   NO  Pain:  NO  Medication concerns:  NO  Social Determinants of Health risks:   NO    Clinic Utilization  Difficulty keeping appointments:: No  Compliance Concerns: No  No-Show Concerns: No  No PCP office visit in Past Year: No  Utilization    Last refreshed: 10/12/2020  1:23 PM: Hospital Admissions 0           Last refreshed: 10/12/2020  1:23 PM: ED Visits 1           Last refreshed: 10/12/2020  1:23 PM: No Show Count (past year) 0              Current as of: 10/12/2020  1:23 PM              Clinical Concerns:  Current Medical Concerns:  Patient diagnosed with Covid. Patient reports no new s/s just has a stuffy nose. Patient is isolating herself. No other concerns reported at this time.   Current Behavioral Concerns: none    Education Provided to patient: Encouraged follow up appointment with PCP.     Pain  Pain (GOAL):: No  Health Maintenance Reviewed: Due/Overdue   Health Maintenance Due   Topic Date Due     DEPRESSION ACTION PLAN  1985     PREVENTIVE CARE VISIT  10/26/2019     PAP FOLLOW-UP  04/01/2020     HPV FOLLOW-UP  04/01/2020     INFLUENZA VACCINE (1) 09/01/2020     DTAP/TDAP/TD IMMUNIZATION (2 - Td) 10/26/2020       Medication Management  Medication reconciliation status: Medications reviewed and reconciled.  Continue medications without change.       Functional Status:  Dependent ADLs:: Independent  Dependent IADLs:: Independent  Bed or wheelchair confined:: No  Mobility Status: Independent    Living Situation:  Current living arrangement::  I live in a private home  Type of residence:: Private home - stairs    Lifestyle & Psychosocial Needs:        Diet:: Regular  Inadequate nutrition (GOAL):: No  Tube Feeding: No  Inadequate activity/exercise (GOAL):: No  Significant changes in sleep pattern (GOAL): No  Transportation means:: Regular car     Buddhism or spiritual beliefs that impact treatment:: No  Mental health DX:: No  Mental health management concern (GOAL):: No  Informal Support system:: Family, Friends   Socioeconomic History     Marital status: Single     Spouse name: Not on file     Number of children: 2     Years of education: Not on file     Highest education level: Not on file   Occupational History     Employer: [employer]     Tobacco Use     Smoking status: Former Smoker     Packs/day: 0.50     Years: 3.00     Pack years: 1.50     Quit date: 5/1/2010     Years since quitting: 10.4     Smokeless tobacco: Never Used     Tobacco comment: quit smoking may 2010    Substance and Sexual Activity     Alcohol use: Yes     Comment: occasionally     Drug use: No     Sexual activity: Yes     Partners: Male     Birth control/protection: Condom, Inserts/Ring     Comment: Nuva ring               Resources and Interventions:  Current Resources:      Community Resources: None  Supplies used at home:: None  Equipment Currently Used at Home: none   )   )    Advance Care Plan/Directive  Advanced Care Plan/Directive Status: Not Applicable    Referrals Placed: None     Goals:   Goals        General    Medical (pt-stated)     Notes - Note created  10/12/2020  1:57 PM by Arun Vergara RN    Goal Statement: I will be safe through the COVID-19 outbreak  Date Goal set: 10-12-20  Barriers: current worldwide pandemic  Strengths: supportive family; access to virtual/telephonic care  Date to Achieve By: 12-30-20  Patient expressed understanding of goal: Yes  Action steps to achieve this goal:  1. I will have access to necessary supplies, including  groceries and prescriptions with support from family and neighbors running errands when possible.  2. I will maintain social distancing if I leave my house, practice careful handwashing and limit contact with public spaces/surfaces  3. I will be mindful of the health benefits to regular socialization with loved ones, family and friends and utilize virtual tools when able to continue relationships while also practicing social distancing (ex. Regular phone calls, virtual phone call, email, etc)                Outreach Frequency: weekly      Plan: 1) Patient will continue to follow treatment plan as directed and follow up with PCP with concerns ongoing.   2) Care Coordination to remain available for future needs. Follow up planned for next week unless otherwise notified.     Dario Vergara RN  Clinic Care Coordinator  [phone]

## 2020-10-12 NOTE — LETTER
[facility]  Complex Care Plan  About Me:    Patient Name:  Tram Bal    YOB: 1985  Age:         35 year old   Natasha MRN:    [id number] Telephone Information:  Home Phone [phone]   Mobile [phone]       Address:  [address] [facility] [address] Email address:  [email or web address]      Emergency Contact(s)    Name Relationship Lgl Grd Work Phone Home Phone Mobile Phone   1. GURWINDER OSBORNE Mother  none [phone]   2. KENAN BAZAN Friend  none [phone]           Primary language:  English     needed? No   [employer]:  [phone] op. 1  Other communication barriers: None  Preferred Method of Communication:  Mail  Current living arrangement: I live in a private home  Mobility Status/ Medical Equipment: Independent    Health Maintenance  Health Maintenance Reviewed: Due/Overdue   Health Maintenance Due   Topic Date Due     DEPRESSION ACTION PLAN  1985     PREVENTIVE CARE VISIT  10/26/2019     PAP FOLLOW-UP  04/01/2020     HPV FOLLOW-UP  04/01/2020     INFLUENZA VACCINE (1) 09/01/2020     DTAP/TDAP/TD IMMUNIZATION (2 - Td) 10/26/2020       My Access Plan  Medical Emergency 911   Primary Clinic Line [facility] - [phone]   24 Hour Appointment Line [phone] or  [phone] ([phone]) (toll-free)   24 Hour Nurse Line [phone] (toll-free)   Preferred Urgent Care [facility], [phone]   Preferred Hospital [place], [place]  [phone]   [id number] - [place], [place] - [address] AT [facility] [address]     Behavioral Health Crisis Line The National Suicide Prevention Lifeline at 1-769.770.9874 or 911             My Care Team Members  Patient Care Team       Relationship Specialty Notifications Start End    Trudi Guevara, DEON CNP PCP - General Nurse  Practitioner - Family  4/25/17     Phone: [phone] Fax: [phone]         [address] [place] [address]    Trudi Guevara APRN CNP Assigned PCP   4/5/20     Phone: [phone] Fax: [phone]         [address] [place] [address]    Arun Vergara, RN Lead Care Coordinator Primary Care - [facility]  10/12/20     Phone: [phone] Fax: [phone]         [address] [place] [address]            My Care Plans  Self Management and Treatment Plan  Goals and (Comments)  Goals        General    Medical (pt-stated)     Notes - Note created  10/12/2020  1:57 PM by Arun Vergara, RN    Goal Statement: I will be safe through the COVID-19 outbreak  Date Goal set: 10-12-20  Barriers: current worldwide pandemic  Strengths: supportive family; access to virtual/telephonic care  Date to Achieve By: 12-30-20  Patient expressed understanding of goal: Yes  Action steps to achieve this goal:  1. I will have access to necessary supplies, including groceries and prescriptions with support from family and neighbors running errands when possible.  2. I will maintain social distancing if I leave my house, practice careful handwashing and limit contact with public spaces/surfaces  3. I will be mindful of the health benefits to regular socialization with loved ones, family and friends and utilize virtual tools when able to continue relationships while also practicing social distancing (ex. Regular phone calls, virtual phone call, email, etc)               Action Plans on File:                       Advance Care Plans/Directives Type:        My Medical and Care Information  Problem List   Patient Active Problem List   Diagnosis     Screening examination for pulmonary tuberculosis     Cervical high risk HPV (human papillomavirus) test positive     Encounter for supervision of other normal pregnancy     Carrier or suspected carrier of group B Streptococcus     Displacement of lumbar intervertebral disc      CARDIOVASCULAR SCREENING; LDL GOAL LESS THAN 160     LAP-BAND surgery status     Tobacco abuse, in remission     Routine general medical examination at a health care facility     Encounter for routine gynecological examination     Encounter for other general counseling or advice on contraception     Generalized anxiety disorder     Bariatric surgery status     Overweight (BMI 25.0-29.9)     Class 1 obesity due to excess calories without serious comorbidity with body mass index (BMI) of 32.0 to 32.9 in adult     Vitamin D deficiency     Mild major depression (H)     Iron deficiency      Current Medications and Allergies:  See printed Medication Report.    Care Coordination Start Date: 10/12/2020   Frequency of Care Coordination: weekly   Form Last Updated: 10/12/2020

## 2020-10-12 NOTE — LETTER
[place] CARE COORDINATION  [place] [id number] [address] [address]  [phone]    October 12, 2020    Tram Bal  [address] [place] [id number]      Dear Tram,    I am a clinic care coordinator who works with DEON Torres CNP at [facility]. I wanted to thank you for spending the time to talk with me.  Below is a description of clinic care coordination and how I can further assist you.      The clinic care coordination team is made up of a registered nurse,  and community health worker who understand the health care system. The goal of clinic care coordination is to help you manage your health and improve access to the health care system in the most efficient manner. The team can assist you in meeting your health care goals by providing education, coordinating services, strengthening the communication among your providers and supporting you with any resource needs.    Please feel free to contact me at [phone] with any questions or concerns. We are focused on providing you with the highest-quality healthcare experience possible and that all starts with you.     Sincerely,     Dario Vergara RN  Clinic Care Coordinator    Discharge Instructions for COVID-19 Patients  You have--or may have--COVID-19. Please follow the instructions listed below.   If you have a weakened immune system, discuss with your doctor any other actions you need to take.  How can I protect others?  If you have symptoms (fever, cough, body aches or trouble breathing):    Stay home and away from others (self-isolate) until:  ? At least 10 days have passed since your symptoms started, And   ? You've had no fever--and no medicine that reduces fever--for 1 full day (24 hours), And    ? Your other symptoms have resolved (gotten better).  If you don't show symptoms, but testing showed that you have COVID-19:    Stay home and away from others (self-isolate). Follow the  "tips under \"How do I self-isolate?\" below for 10 days (20 days if you have a weak immune system).    You don't need to be retested for COVID-19 before going back to school or work. As long as you're fever-free and feeling better, you can go back to school, work and other activities after waiting the 10 or 20 days.   How do I self-isolate?    Stay in your own room, even for meals. Use your own bathroom if you can.    Stay away from others in your home. No hugging, kissing or shaking hands. No visitors.    Don't go to work, school or anywhere else.    Clean \"high touch\" surfaces often (doorknobs, counters, handles). Use household cleaning spray or wipes. You'll find a full list of  on the EPA website: www.epa.gov/pesticide-registration/list-n-disinfectants-use-against-sars-cov-2.    Cover your mouth and nose with a mask or other face covering to avoid spreading germs.    Wash your hands and face often. Use soap and water.    Caregivers in these groups are at risk for severe illness due to COVID-19:  ? People 65 years and older  ? People who live in a nursing home or long-term care facility  ? People with chronic disease (lung, heart, cancer, diabetes, kidney, liver, immunologic)  ? People who have a weakened immune system, including those who:    Are in cancer treatment    Take medicine that weakens the immune system, such as corticosteroids    Had a bone marrow or organ transplant    Have an immune deficiency    Have poorly controlled HIV or AIDS    Are obese (body mass index of 40 or higher)    Smoke regularly    Caregivers should wear gloves while washing dishes, handling laundry and cleaning bedrooms and bathrooms.    Use caution when washing and drying laundry: Don't shake dirty laundry and use the warmest water setting that you can.    For more tips on managing your health at home, go to www.cdc.gov/coronavirus/2019-ncov/downloads/10Things.pdf.  How can I take care of myself at home?  1. Get lots of " rest. Drink extra fluids (unless a doctor has told you not to).    2. Take Tylenol (acetaminophen) for fever or pain. If you have liver or kidney problems, ask your family doctor if it's okay to take Tylenol.     Adults can take either:  ? 650 mg (two 325 mg pills) every 4 to 6 hours, or   ? 1,000 mg (two 500 mg pills) every 8 hours as needed.  ? Note: Don't take more than 3,000 mg in one day. Acetaminophen is found in many medicines (both prescribed and over-the-counter medicines). Read all labels to be sure you don't take too much.   For children, check the Tylenol bottle for the right dose. The dose is based on the child's age or weight.  3. If you have other health problems (like cancer, heart failure, an organ transplant or severe kidney disease): Call your specialty clinic if you don't feel better in the next 2 days.    4. Know when to call 911. Emergency warning signs include:  ? Trouble breathing or shortness of breath  ? Pain or pressure in the chest that doesn't go away  ? Feeling confused like you haven't felt before, or not being able to wake up  ? Bluish-colored lips or face    5. Your doctor may have prescribed a blood thinner medicine. Follow their instructions.  Where can I get more information?    [facility] - About COVID-19: [email or web address]    CDC - What to Do If You're Sick: www.cdc.gov/coronavirus/2019-ncov/about/steps-when-sick.html    CDC - Ending Home Isolation: www.cdc.gov/coronavirus/2019-ncov/hcp/disposition-in-home-patients.html    CDC - Caring for Someone: www.cdc.gov/coronavirus/2019-ncov/if-you-are-sick/care-for-someone.html    [facility] - Interim Guidance for Hospital Discharge to Home: www.health.[facility].[place].us/diseases/coronavirus/hcp/hospdischarge.pdf    [facility] clinical trials (COVID-19 research studies): clinicalaffairs.[facility]-clinical-trials    Below are the COVID-19 hotlines at the [place] Department of Health ([facility]). Interpreters are  available.  ? For health questions: Call 746-893-7862 or 1-740.210.1228 (7 a.m. to 7 p.m.)  ? For questions about schools and childcare: Call 378-073-5012 or 1-722.684.7950 (7 a.m. to 7 p.m.)    For informational purposes only. Not to replace the advice of your health care provider. Clinically reviewed by the Infection Prevention Team. Copyright   2020 [facility]. All rights reserved. [employer]ift [id number] - REV 08/04/20.

## 2020-10-23 ENCOUNTER — PATIENT OUTREACH (OUTPATIENT)
Dept: CARE COORDINATION | Facility: CLINIC | Age: 35
End: 2020-10-23

## 2020-10-23 ASSESSMENT — ACTIVITIES OF DAILY LIVING (ADL): DEPENDENT_IADLS:: INDEPENDENT

## 2020-10-23 NOTE — PROGRESS NOTES
Clinic Care Coordination Contact  [facility]/Voicemail    Referral Source: ED Follow-Up  Clinical Data: Care Coordinator Outreach  Outreach attempted x 1.  Left message on patient's voicemail with call back information and requested return call.  Plan: Care Coordinator sent care coordination introduction letter on 10-12 via Mobile2Me. Care Coordinator will try to reach patient again in 3-5 business days.  Dario Vergara RN  Primary Care Clinic RN Care Coordinator  [facility]   [phone]

## 2020-10-30 DIAGNOSIS — Z30.015 ENCOUNTER FOR INITIAL PRESCRIPTION OF VAGINAL RING HORMONAL CONTRACEPTIVE: ICD-10-CM

## 2020-10-30 RX ORDER — ETONOGESTREL AND ETHINYL ESTRADIOL VAGINAL RING .015; .12 MG/D; MG/D
RING VAGINAL
Qty: 1 EACH | Refills: 0 | Status: SHIPPED | OUTPATIENT
Start: 2020-10-30 | End: 2020-11-23

## 2020-11-03 ENCOUNTER — PATIENT OUTREACH (OUTPATIENT)
Dept: CARE COORDINATION | Facility: CLINIC | Age: 35
End: 2020-11-03

## 2020-11-03 NOTE — PROGRESS NOTES
Clinic Care Coordination Contact  [facility]/Voicemail       Clinical Data: Care Coordinator Outreach  Outreach attempted x 2.  Left message on patient's voicemail with call back information and requested return call.  Plan: Care Coordinator sent care coordination introduction letter on 10-12 via Zurn. Care Coordinator will do no further outreaches at this time.  Dario Vergara RN  Primary Care Clinic RN Care Coordinator  [facility]   [phone]

## 2020-11-16 ENCOUNTER — HEALTH MAINTENANCE LETTER (OUTPATIENT)
Age: 35
End: 2020-11-16

## 2020-11-23 ENCOUNTER — OFFICE VISIT (OUTPATIENT)
Dept: FAMILY MEDICINE | Facility: CLINIC | Age: 35
End: 2020-11-23
Payer: COMMERCIAL

## 2020-11-23 VITALS
HEART RATE: 90 BPM | BODY MASS INDEX: 31.69 KG/M2 | DIASTOLIC BLOOD PRESSURE: 83 MMHG | OXYGEN SATURATION: 96 % | RESPIRATION RATE: 18 BRPM | WEIGHT: 185.6 LBS | TEMPERATURE: 98.5 F | HEIGHT: 64 IN | SYSTOLIC BLOOD PRESSURE: 128 MMHG

## 2020-11-23 DIAGNOSIS — Z00.00 ROUTINE GENERAL MEDICAL EXAMINATION AT A HEALTH CARE FACILITY: ICD-10-CM

## 2020-11-23 DIAGNOSIS — Z30.44 ENCOUNTER FOR SURVEILLANCE OF VAGINAL RING HORMONAL CONTRACEPTIVE DEVICE: ICD-10-CM

## 2020-11-23 DIAGNOSIS — Z12.4 SCREENING FOR CERVICAL CANCER: ICD-10-CM

## 2020-11-23 PROCEDURE — 99395 PREV VISIT EST AGE 18-39: CPT | Performed by: NURSE PRACTITIONER

## 2020-11-23 PROCEDURE — G0145 SCR C/V CYTO,THINLAYER,RESCR: HCPCS | Performed by: NURSE PRACTITIONER

## 2020-11-23 PROCEDURE — 87624 HPV HI-RISK TYP POOLED RSLT: CPT | Performed by: NURSE PRACTITIONER

## 2020-11-23 RX ORDER — ETONOGESTREL AND ETHINYL ESTRADIOL VAGINAL RING .015; .12 MG/D; MG/D
RING VAGINAL
Qty: 3 EACH | Refills: 3 | Status: SHIPPED | OUTPATIENT
Start: 2020-11-23 | End: 2020-11-30

## 2020-11-23 ASSESSMENT — ENCOUNTER SYMPTOMS
CONSTIPATION: 0
DYSURIA: 0
HEMATURIA: 0
BREAST MASS: 0
JOINT SWELLING: 0
MYALGIAS: 0
COUGH: 0
EYE PAIN: 0
SORE THROAT: 0
SHORTNESS OF BREATH: 0
HEADACHES: 1
ABDOMINAL PAIN: 0
HEMATOCHEZIA: 0
PALPITATIONS: 0
FREQUENCY: 0
WEAKNESS: 0
CHILLS: 0
PARESTHESIAS: 0
NERVOUS/ANXIOUS: 0
ARTHRALGIAS: 0
FEVER: 0
DIZZINESS: 0
NAUSEA: 0
DIARRHEA: 0
HEARTBURN: 1

## 2020-11-23 ASSESSMENT — MIFFLIN-ST. JEOR: SCORE: 1512.91

## 2020-11-23 NOTE — PROGRESS NOTES
SUBJECTIVE:   CC: Tram Bal is an 35 year old woman who presents for preventive health visit.     Patient has been advised of split billing requirements and indicates understanding: No  Healthy Habits:     Getting at least 3 servings of Calcium per day:  Yes    Bi-annual eye exam:  NO    Dental care twice a year:  Yes    Sleep apnea or symptoms of sleep apnea:  None    Diet:  Regular (no restrictions)    Frequency of exercise:  2-3 days/week    Duration of exercise:  15-30 minutes    Taking medications regularly:  No    Barriers to taking medications:  Problems remembering to take them    Medication side effects:  None    PHQ-2 Total Score: 1    Additional concerns today:  No    No personal or family history of blood clots, stroke or breast cancer.  Does not use tobacco.  Getting headaches with menstrual cycles now, that seemed to start when switched from brand to generic.      Today's PHQ-2 Score:   PHQ-2 ( 1999 Pfizer) 11/23/2020   Q1: Little interest or pleasure in doing things 0   Q2: Feeling down, depressed or hopeless 1   PHQ-2 Score 1   Q1: Little interest or pleasure in doing things Not at all   Q2: Feeling down, depressed or hopeless Several days   PHQ-2 Score 1       Abuse: Current or Past (Physical, Sexual or Emotional) - No  Do you feel safe in your environment? Yes    Social History     Tobacco Use     Smoking status: Former Smoker     Packs/day: 0.50     Years: 3.00     Pack years: 1.50     Quit date: 5/1/2010     Years since quitting: 10.5     Smokeless tobacco: Never Used     Tobacco comment: quit smoking may 2010    Substance Use Topics     Alcohol use: Yes     Comment: occasionally       Alcohol Use 11/23/2020   Prescreen: >3 drinks/day or >7 drinks/week? No   Prescreen: >3 drinks/day or >7 drinks/week? -     Reviewed orders with patient.  Reviewed health maintenance and updated orders accordingly - Yes  Labs reviewed in EPIC    Mammogram not appropriate for this patient based on  "age.    Pertinent mammograms are reviewed under the imaging tab.  History of abnormal Pap smear:   Last 3 Pap Results:     PAP / HPV Latest Ref Rng & Units 4/1/2019 10/26/2018 5/22/2017   PAP - NIL NIL ASC-US(A)   HPV 16 DNA NEG:Negative Negative Negative Negative   HPV 18 DNA NEG:Negative Negative Negative Negative   OTHER HR HPV NEG:Negative Positive(A) Positive(A) Positive(A)     Reviewed and updated as needed this visit by clinical staff  Tobacco  Allergies  Meds   Med Hx  Surg Hx  Fam Hx  Soc Hx        Reviewed and updated as needed this visit by Provider                  Review of Systems   Constitutional: Negative for chills and fever.   HENT: Negative for congestion, ear pain, hearing loss and sore throat.    Eyes: Negative for pain and visual disturbance.   Respiratory: Negative for cough and shortness of breath.    Cardiovascular: Negative for chest pain, palpitations and peripheral edema.   Gastrointestinal: Positive for heartburn. Negative for abdominal pain, constipation, diarrhea, hematochezia and nausea.   Breasts:  Negative for tenderness, breast mass and discharge.   Genitourinary: Negative for dysuria, frequency, genital sores, hematuria, pelvic pain, urgency, vaginal bleeding and vaginal discharge.   Musculoskeletal: Negative for arthralgias, joint swelling and myalgias.   Skin: Negative for rash.   Neurological: Positive for headaches. Negative for dizziness, weakness and paresthesias.   Psychiatric/Behavioral: Negative for mood changes. The patient is not nervous/anxious.      OBJECTIVE:   /83   Pulse 90   Temp 98.5  F (36.9  C) (Tympanic)   Resp 18   Ht 1.619 m (5' 3.75\")   Wt 84.2 kg (185 lb 9.6 oz)   LMP 10/28/2020 (Exact Date)   SpO2 96%   BMI 32.11 kg/m    Physical Exam  GENERAL: healthy, alert and no distress  EYES: Eyes grossly normal to inspection, PERRL and conjunctivae and sclerae normal  HENT: ear canals and TM's normal, nose and mouth without ulcers or " "lesions  NECK: no adenopathy, no asymmetry, masses, or scars and thyroid normal to palpation  RESP: lungs clear to auscultation - no rales, rhonchi or wheezes  BREAST: normal without masses, tenderness or nipple discharge, no palpable axillary masses or adenopathy and fibrous breast tissue  CV: regular rate and rhythm, normal S1 S2, no S3 or S4, no murmur, click or rub, no peripheral edema and peripheral pulses strong  ABDOMEN: soft, nontender, no hepatosplenomegaly, no masses and bowel sounds normal   (female): normal female external genitalia, normal urethral meatus, vaginal mucosa pink, moist, well rugated, and normal cervix/adnexa/uterus without masses or discharge  MS: no gross musculoskeletal defects noted, no edema  SKIN: no suspicious lesions or rashes  NEURO: Normal strength and tone, mentation intact and speech normal  PSYCH: mentation appears normal, affect normal/bright    Diagnostic Test Results:  Labs reviewed in Epic    ASSESSMENT/PLAN:   1. Routine general medical examination at a health care facility      2. Encounter for surveillance of vaginal ring hormonal contraceptive device  Headaches on off weeks since switching brands, recommend brand only and will consider switching birth control if not resolving.   - etonogestrel-ethinyl estradiol (NUVARING) 0.12-0.015 MG/24HR vaginal ring; INSERT 1 RING VAGINALLY EVERY 21 DAYS THEN REMOVE FOR 1 WEEK, THEN REPEAT WITH NEW RING  Dispense: 3 each; Refill: 3    3. Screening for cervical cancer    - Pap imaged thin layer screen with HPV - recommended age 30 - 65 years (select HPV order below)  - HPV High Risk Types DNA Cervical    Patient has been advised of split billing requirements and indicates understanding: No  COUNSELING:  Reviewed preventive health counseling, as reflected in patient instructions    Estimated body mass index is 32.11 kg/m  as calculated from the following:    Height as of this encounter: 1.619 m (5' 3.75\").    Weight as of this " encounter: 84.2 kg (185 lb 9.6 oz).    Weight management plan: Discussed healthy diet and exercise guidelines    She reports that she quit smoking about 10 years ago. She has a 1.50 pack-year smoking history. She has never used smokeless tobacco.      Counseling Resources:  ATP IV Guidelines  Pooled Cohorts Equation Calculator  Breast Cancer Risk Calculator  BRCA-Related Cancer Risk Assessment: FHS-7 Tool  FRAX Risk Assessment  ICSI Preventive Guidelines  Dietary Guidelines for Americans, 2010  USDA's MyPlate  ASA Prophylaxis  Lung CA Screening    DEON Torres CNP  [facility]

## 2020-11-23 NOTE — NURSING NOTE
"Initial /83   Pulse 90   Temp 98.5  F (36.9  C) (Tympanic)   Resp 18   Ht 1.619 m (5' 3.75\")   Wt 84.2 kg (185 lb 9.6 oz)   LMP 10/28/2020 (Exact Date)   SpO2 96%   BMI 32.11 kg/m   Estimated body mass index is 32.11 kg/m  as calculated from the following:    Height as of this encounter: 1.619 m (5' 3.75\").    Weight as of this encounter: 84.2 kg (185 lb 9.6 oz). .      "

## 2020-11-27 LAB
COPATH REPORT: NORMAL
PAP: NORMAL

## 2020-11-30 ENCOUNTER — TELEPHONE (OUTPATIENT)
Dept: FAMILY MEDICINE | Facility: CLINIC | Age: 35
End: 2020-11-30

## 2020-11-30 DIAGNOSIS — Z30.44 ENCOUNTER FOR SURVEILLANCE OF VAGINAL RING HORMONAL CONTRACEPTIVE DEVICE: ICD-10-CM

## 2020-11-30 RX ORDER — ETONOGESTREL AND ETHINYL ESTRADIOL VAGINAL RING .015; .12 MG/D; MG/D
RING VAGINAL
Qty: 3 EACH | Refills: 3 | Status: SHIPPED | OUTPATIENT
Start: 2020-11-30 | End: 2021-10-11

## 2020-11-30 NOTE — TELEPHONE ENCOUNTER
Prior Authorization Not Needed per Insurance    Medication: etonogestrel-ethinyl estradiol (NUVARING) 0.12-0.015 MG/24HR vaginal ring  Insurance Company: [employer] - Phone [phone] Fax [phone]  Expected CoPay:      Pharmacy Filling the Rx: [id number] - [place], [place] - [address] [place] [address] AT [facility] & [address]  Pharmacy Notified: Yes  Patient Notified: Yes **Instructed pharmacy to notify patient when script is ready to /ship.**    Script sent on 11/30/20--states NO CARLEE. Pharmacy was able to process generic.

## 2020-11-30 NOTE — TELEPHONE ENCOUNTER
Prior Authorization Retail Medication Request    Medication/Dose: nuvaring  ICD code (if different than what is on RX):  Encounter for surveillance of vaginal ring hormonal contraceptive device [Z30.44]   Previously Tried and Failed:  Pt stable on this med  Rationale:       Insurance Name:  [phone]  Insurance ID:  [id number]      Pharmacy Information (if different than what is on RX)  Name:  [facility]  Phone:

## 2020-12-01 ENCOUNTER — PATIENT OUTREACH (OUTPATIENT)
Dept: FAMILY MEDICINE | Facility: CLINIC | Age: 35
End: 2020-12-01

## 2020-12-01 DIAGNOSIS — R87.810 CERVICAL HIGH RISK HPV (HUMAN PAPILLOMAVIRUS) TEST POSITIVE: ICD-10-CM

## 2020-12-01 NOTE — TELEPHONE ENCOUNTER
7/04: LSIL, 10/04: [facility]- JARETH I  4/05: LSIL. 6/05: [facility] - JARETH II & III  8/05: LEEP - JARETH III  12/05: NIL pap  4/06: ASCUS, neg HPV  NIL paps: 10/06, 11/07, 12/08  1/10: ASCUS, neg HPV  NIL paps: 2/11, 4/10/12 pap NIL  2/20/13 pap NIL  3/31/14 pap NIL/neg HPV. Plan-- repeat pap/HPV in 1 year. If NIL/neg HPV again at that time, patient will be able to go to  3 yr testing.  4/20/15 pap NIL/neg HPV. Plan: repeat pap and HPV testing in 3 years.  4/25/16: Pap - ASCUS, Neg HPV. Cotest in 1 year.   4/28/17:Pap: ASCUS, +High Risk HPV (Neg 16/18).  Plan [facility]-  5/22/17:[facility]romwell- Benign.  Pap: ASCUS, +HR HPV (Not types 16/18). Plan cotest in 1 year.   10/26/18 NIL Pap, + HR HPV (Neg 16/18). Plan colp  4/1/19 [facility]: ECC - atypia, favor reactive change.  NIL pap, + HR HPV (not 16 or 18). Plan: cotest in 1 yr   4/10/19 Pt [place] by [place]. Liang. ([facility]  4/3/20 Due to COVID restrictions - Routed to RN to review recommendations (rlm)  4/9/20 COVID 19 interim plan updated to: Plan unchanged. (angelita) CCT Tracking.  11/23/20 NIL Pap, + HR HPV (neg 16/18). [facility] due by 2/23/2021

## 2021-02-23 ENCOUNTER — VIRTUAL VISIT (OUTPATIENT)
Dept: FAMILY MEDICINE | Facility: CLINIC | Age: 36
End: 2021-02-23
Payer: COMMERCIAL

## 2021-02-23 DIAGNOSIS — L08.9 PIERCING OF RIGHT EYEBROW WITH INFECTION, INITIAL ENCOUNTER: Primary | ICD-10-CM

## 2021-02-23 DIAGNOSIS — S01.131A PIERCING OF RIGHT EYEBROW WITH INFECTION, INITIAL ENCOUNTER: Primary | ICD-10-CM

## 2021-02-23 PROCEDURE — 99213 OFFICE O/P EST LOW 20 MIN: CPT | Mod: 95 | Performed by: NURSE PRACTITIONER

## 2021-02-23 RX ORDER — CEPHALEXIN 500 MG/1
500 CAPSULE ORAL 2 TIMES DAILY
Qty: 14 CAPSULE | Refills: 0 | Status: SHIPPED | OUTPATIENT
Start: 2021-02-23 | End: 2021-03-02

## 2021-02-23 NOTE — PATIENT INSTRUCTIONS
Piercing of right eyebrow with infection, initial encounter  Acute, pulled on piercing 2 days ago with increased redness, swelling and drainage starting last evening. No fevers. Discussed keeping area clean with warm soapy water or saline solution and over the counter antibiotic. Will also start an oral antibiotic to cover. Advised if worsening or not improving to return to clinic.   - cephALEXin (KEFLEX) 500 MG capsule; Take 1 capsule (500 mg) by mouth 2 times daily for 7 days

## 2021-02-23 NOTE — PROGRESS NOTES
Tram is a 35 year old who is being evaluated via a billable video visit.      How would you like to obtain your AVS? MyChart  If the video visit is dropped, the invitation should be resent by: Text to cell phone: [phone]  Will anyone else be joining your video visit? No      Video Start Time: 4:15 PM - 4:23 PM     Assessment & Plan     Piercing of right eyebrow with infection, initial encounter  Acute, pulled on piercing 2 days ago with increased redness, swelling and drainage starting last evening. No fevers. Discussed keeping area clean with warm soapy water or saline solution and over the counter antibiotic. Will also start an oral antibiotic to cover. Advised if worsening or not improving to return to clinic.   - cephALEXin (KEFLEX) 500 MG capsule; Take 1 capsule (500 mg) by mouth 2 times daily for 7 days             See Patient Instructions    Return in about 1 week (around 3/2/2021), or if symptoms worsen or fail to improve.    Mckenna Loving, DEON CNP  [facility]    Subjective   Tram is a 35 year old who presents for the following health issues     HPI       Concern - infection  Onset: last night started to drain  A couple days ago got her facial piercing caught in her sweater and it pulled  Had the piercing 4 years ago  Description:  Whitish drainage as of last night and redness around and from piercing  Intensity: mild  Progression of Symptoms:  worsening  Accompanying Signs & Symptoms: 0  Previous history of similar problem: 0  Precipitating factors:        Worsened by: 0  Alleviating factors:        Improved by: 0  Therapies tried and outcome: cleaning with saline solution and antibiotic     No fever       Review of Systems   Constitutional, HEENT, cardiovascular, pulmonary, gi and gu systems are negative, except as otherwise noted.      Objective           Vitals:  No vitals were obtained today due to virtual visit.    Physical Exam   GENERAL: Healthy, alert and no  distress  EYES: Eyes grossly normal to inspection.  No discharge or erythema, or obvious scleral/conjunctival abnormalities.  RESP: No audible wheeze, cough, or visible cyanosis.  No visible retractions or increased work of breathing.    SKIN: visible erythema and swelling over right eyebrow/face piercing  NEURO: Cranial nerves grossly intact.  Mentation and speech appropriate for age.  PSYCH: Mentation appears normal, affect normal/bright, judgement and insight intact, normal speech and appearance well-groomed.    Diagnostic Test Results:  none              Video-Visit Details    Type of service:  Video Visit    Video End Time:4:23 PM    Originating Location (pt. Location): Home    Distant Location (provider location):  [facility]     Platform used for Video Visit: [employer]

## 2021-02-25 ENCOUNTER — PATIENT OUTREACH (OUTPATIENT)
Dept: FAMILY MEDICINE | Facility: CLINIC | Age: 36
End: 2021-02-25

## 2021-02-25 DIAGNOSIS — R87.810 CERVICAL HIGH RISK HPV (HUMAN PAPILLOMAVIRUS) TEST POSITIVE: ICD-10-CM

## 2021-06-23 NOTE — LETTER
[facility]  [address] [place] [id number]  [phone]    April 28, 2017        Tram HURTADO Valeriano  [address] [facility] [address]          To whom it may concern:    Please allow Tram to have a therapeutic pet for medical purposes.    Please contact me for questions or concerns.        Sincerely,        Trudi Guevara CNP   Propranolol Counseling:  I discussed with the patient the risks of propranolol including but not limited to low heart rate, low blood pressure, low blood sugar, restlessness and increased cold sensitivity. They should call the office if they experience any of these side effects.

## 2021-06-29 ENCOUNTER — OFFICE VISIT (OUTPATIENT)
Dept: FAMILY MEDICINE | Facility: CLINIC | Age: 36
End: 2021-06-29
Payer: COMMERCIAL

## 2021-06-29 VITALS
OXYGEN SATURATION: 98 % | TEMPERATURE: 98.4 F | BODY MASS INDEX: 30.56 KG/M2 | RESPIRATION RATE: 16 BRPM | HEART RATE: 79 BPM | DIASTOLIC BLOOD PRESSURE: 80 MMHG | HEIGHT: 64 IN | WEIGHT: 179 LBS | SYSTOLIC BLOOD PRESSURE: 120 MMHG

## 2021-06-29 DIAGNOSIS — F32.0 MILD MAJOR DEPRESSION (H): ICD-10-CM

## 2021-06-29 DIAGNOSIS — R87.810 CERVICAL HIGH RISK HUMAN PAPILLOMAVIRUS (HPV) DNA TEST POSITIVE: Primary | ICD-10-CM

## 2021-06-29 PROCEDURE — 88305 TISSUE EXAM BY PATHOLOGIST: CPT | Performed by: PATHOLOGY

## 2021-06-29 PROCEDURE — 99207 PR NO CHARGE LOS: CPT | Performed by: FAMILY MEDICINE

## 2021-06-29 PROCEDURE — 57456 ENDOCERV CURETTAGE W/SCOPE: CPT | Performed by: FAMILY MEDICINE

## 2021-06-29 ASSESSMENT — ANXIETY QUESTIONNAIRES
5. BEING SO RESTLESS THAT IT IS HARD TO SIT STILL: NOT AT ALL
1. FEELING NERVOUS, ANXIOUS, OR ON EDGE: SEVERAL DAYS
GAD7 TOTAL SCORE: 4
GAD7 TOTAL SCORE: 4
7. FEELING AFRAID AS IF SOMETHING AWFUL MIGHT HAPPEN: NOT AT ALL
GAD7 TOTAL SCORE: 4
6. BECOMING EASILY ANNOYED OR IRRITABLE: SEVERAL DAYS
7. FEELING AFRAID AS IF SOMETHING AWFUL MIGHT HAPPEN: NOT AT ALL
4. TROUBLE RELAXING: SEVERAL DAYS
3. WORRYING TOO MUCH ABOUT DIFFERENT THINGS: SEVERAL DAYS
2. NOT BEING ABLE TO STOP OR CONTROL WORRYING: NOT AT ALL

## 2021-06-29 ASSESSMENT — PATIENT HEALTH QUESTIONNAIRE - PHQ9
SUM OF ALL RESPONSES TO PHQ QUESTIONS 1-9: 3
10. IF YOU CHECKED OFF ANY PROBLEMS, HOW DIFFICULT HAVE THESE PROBLEMS MADE IT FOR YOU TO DO YOUR WORK, TAKE CARE OF THINGS AT HOME, OR GET ALONG WITH OTHER PEOPLE: SOMEWHAT DIFFICULT
SUM OF ALL RESPONSES TO PHQ QUESTIONS 1-9: 3

## 2021-06-29 ASSESSMENT — MIFFLIN-ST. JEOR: SCORE: 1482.97

## 2021-06-29 NOTE — PROGRESS NOTES
Referring Physician:  Sally Guevara  Reason for Colposcopy:  HPV  [phone] (home)   none (work)  Marital status:  single  Number of pregnancies:  2         Children:   2  Patient's last menstrual period was 05/11/2021.  Abnormal bleeding?No  Abnormal discharge? No  Age at first intercourse:  14  Number of sexual partners (lifetime):  10  Types of contraception (lifetime):  nuva ring, oral, depo  Smoker:  No  Allergies   Allergen Reactions     Famvir [Famciclovir] Hives     Current Outpatient Medications   Medication Sig Dispense Refill     etonogestrel-ethinyl estradiol (NUVARING) 0.12-0.015 MG/24HR vaginal ring INSERT ONE RING VAGINALLY EVERY 21 DAYS THEN REMOVE FOR 1 WEEK THEN REPEAT WITH NEW RING 3 each 3     Multiple Vitamins-Iron TABS Take by mouth daily        acyclovir (ZOVIRAX) 400 MG tablet TAKE 1 TABLET(400 MG) BY MOUTH THREE TIMES DAILY (Patient not taking: Reported on 11/23/2020) 30 tablet 0         HX of previous cryocautery?  YES - Date: 15 years ago  Previous Abnormal Paps?  YES - Date: yearly  Personal Hx of Cancer? NO  Family Hx of Cancer?NO  KELSY Exposure?  NO  Hx of sexual abuse? No  Previous Hysterectomy?  No  Other Gyn Surgery?    Hx of Veneral Disease?NO  Do you desire testing for any of these diseases?  Yes  HX of genital warts? No  Partner(s) with warts?: No  Visible warts now?  No  Previously treated?  No  If yes, how?      Answers for HPI/ROS submitted by the patient on 6/29/2021   If you checked off any problems, how difficult have these problems made it for you to do your work, take care of things at home, or get along with other people?: Somewhat difficult  PHQ9 TOTAL SCORE: 3  CLARA 7 TOTAL SCORE: 4    GYN: Colposcopy/LEEP    Date/Time: 7/11/2021 1:18 PM  Performed by: Batool Liang MD  Authorized by: Batool Liang MD     Consent:     Consent obtained:  Written    Consent given by:  Patient    Procedural risks discussed:  Bleeding, possible continued pain, failure  rate, infection and repeat procedure    Patient questions answered: yes      Patient agrees, verbalizes understanding, and wants to proceed: yes      Educational handouts given: yes      Instructions and paperwork completed: yes    Pre-procedure:     Premeds:  Ibuprofen    Prepped with: acetic acid    Indication:     Indication:  HPV    HPV Indications:  Other high risk  Procedure:     Procedure: Colposcopy w/ endocervical curettage      Under satisfactory analgesia the patient was prepped and draped in the dorsal lithotomy position: yes      Bolivar speculum was placed in the vagina: yes      Under colposcopic examination the transition zone was seen in entirety: yes      Intracervical block was performed: no      Endocervix was curetted using a Kevorkian curette: yes      Tampon inserted: no      Monsel's solution was applied: no      Biopsy(s): no      Specimen to pathology: yes    Post-procedure:     Findings: Negative      Impression: Normal appearing cervix      Patient tolerance of procedure:  Patient tolerated the procedure well with no immediate complications

## 2021-06-29 NOTE — NURSING NOTE
"Chief Complaint   Patient presents with     Colposcopy     pos HPV       Initial /80 (BP Location: Right arm)   Pulse 79   Temp 98.4  F (36.9  C) (Tympanic)   Resp 16   Ht 1.619 m (5' 3.75\")   Wt 81.2 kg (179 lb)   LMP 05/11/2021   SpO2 98%   BMI 30.97 kg/m   Estimated body mass index is 30.97 kg/m  as calculated from the following:    Height as of this encounter: 1.619 m (5' 3.75\").    Weight as of this encounter: 81.2 kg (179 lb).    Patient presents to the clinic using No DME    Health Maintenance that is potentially due pending provider review:  NONE    n/a    Is there anyone who you would like to be able to receive your results? No  If yes have patient fill out THERESE    "

## 2021-06-30 LAB
C TRACH DNA SPEC QL NAA+PROBE: ABNORMAL
N GONORRHOEA DNA SPEC QL NAA+PROBE: ABNORMAL
SPECIMEN SOURCE: ABNORMAL
SPECIMEN SOURCE: ABNORMAL

## 2021-06-30 ASSESSMENT — ANXIETY QUESTIONNAIRES: GAD7 TOTAL SCORE: 4

## 2021-06-30 ASSESSMENT — PATIENT HEALTH QUESTIONNAIRE - PHQ9: SUM OF ALL RESPONSES TO PHQ QUESTIONS 1-9: 3

## 2021-07-01 ENCOUNTER — PATIENT OUTREACH (OUTPATIENT)
Dept: FAMILY MEDICINE | Facility: CLINIC | Age: 36
End: 2021-07-01

## 2021-07-01 LAB — COPATH REPORT: NORMAL

## 2021-07-28 ENCOUNTER — LAB (OUTPATIENT)
Dept: LAB | Facility: CLINIC | Age: 36
End: 2021-07-28
Payer: COMMERCIAL

## 2021-07-28 DIAGNOSIS — R87.810 CERVICAL HIGH RISK HUMAN PAPILLOMAVIRUS (HPV) DNA TEST POSITIVE: ICD-10-CM

## 2021-07-28 PROCEDURE — 87491 CHLMYD TRACH DNA AMP PROBE: CPT

## 2021-07-29 LAB — C TRACH DNA SPEC QL NAA+PROBE: NEGATIVE

## 2021-09-01 ENCOUNTER — E-VISIT (OUTPATIENT)
Dept: FAMILY MEDICINE | Facility: CLINIC | Age: 36
End: 2021-09-01
Payer: COMMERCIAL

## 2021-09-01 DIAGNOSIS — B96.89 ACUTE BACTERIAL SINUSITIS: Primary | ICD-10-CM

## 2021-09-01 DIAGNOSIS — J01.90 ACUTE BACTERIAL SINUSITIS: Primary | ICD-10-CM

## 2021-09-01 PROCEDURE — 99421 OL DIG E/M SVC 5-10 MIN: CPT | Performed by: PHYSICIAN ASSISTANT

## 2021-09-01 NOTE — PATIENT INSTRUCTIONS
Dear Tram Bal    After reviewing your responses, I've been able to diagnose you with?a sinus infection caused by bacteria.?     Based on your responses and diagnosis, I have prescribed antibiotics to treat your symptoms. I have sent this to your pharmacy.?     It is also important to stay well hydrated, get lots of rest and take over-the-counter decongestants,?tylenol?or ibuprofen if you?are able to?take those medications per your primary care provider to help relieve discomfort.?     It is important that you take?all of?your prescribed medication even if your symptoms are improving after a few doses.? Taking?all of?your medicine helps prevent the symptoms from returning.?     If your symptoms worsen, you develop severe headache, vomiting, high fever (>102), or are not improving in 7 days, please contact your primary care provider for an appointment or visit any of our convenient Walk-in Care or Urgent Care Centers to be seen which can be found on our website?here.?     Thanks again for choosing?us?as your health care partner,?   ?  Marnie Nova PA-C?     Sinusitis (Antibiotic Treatment)    The sinuses are air-filled spaces within the bones of the face. They connect to the inside of the nose. Sinusitis is an inflammation of the tissue that lines the sinuses. Sinusitis can occur during a cold. It can also happen due to allergies to pollens and other particles in the air. Sinusitis can cause symptoms of sinus congestion and a feeling of fullness. A sinus infection causes fever, headache, and facial pain. There is often green or yellow fluid draining from the nose or into the back of the throat (post-nasal drip). You have been given antibiotics to treat this condition.   Home care    Take the full course of antibiotics as instructed. Don't stop taking them, even when you feel better.    Drink plenty of water, hot tea, and other liquids as directed by the healthcare provider. This may help thin nasal  mucus. It also may help your sinuses drain fluids.    Heat may help soothe painful areas of your face. Use a towel soaked in hot water. Or,  the shower and direct the warm spray onto your face. Using a vaporizer along with a menthol rub at night may also help soothe symptoms.     An expectorant with guaifenesin may help thin nasal mucus and help your sinuses drain fluids. Talk with your provider or pharmacists before taking an over-the-counter (OTC) medicine if you have any questions about it or its side effects..    You can use an OTC decongestant, unless a similar medicine was prescribed to you. Nasal sprays work the fastest. Use one that contains phenylephrine or oxymetazoline. First blow your nose gently. Then use the spray. Don't use these medicines more often than directed on the label. If you do, your symptoms may get worse. You may also take pills that contain pseudoephedrine. Don t use products that combine multiple medicines. This is because side effects may be increased. Read labels. You can also ask the pharmacist for help. (People with high blood pressure should not use decongestants. They can raise blood pressure.) Talk with your provider or pharmacist if you have any questions about the medicine..    OTC antihistamines may help if allergies contributed to your sinusitis. Talk with your provider or pharmacist if you have any questions about the medicine..    Don't use nasal rinses or irrigation during an acute sinus infection, unless your healthcare provider tells you to. Rinsing may spread the infection to other areas in your sinuses.    Use acetaminophen or ibuprofen to control pain, unless another pain medicine was prescribed to you. If you have chronic liver or kidney disease or ever had a stomach ulcer, talk with your healthcare provider before using these medicines. Never give aspirin to anyone under age 18 who is ill with a fever. It may cause severe liver damage.    Don't smoke. This  can make symptoms worse.    Follow-up care  Follow up with your healthcare provider, or as advised.   When to seek medical advice  Call your healthcare provider if any of these occur:     Facial pain or headache that gets worse    Stiff neck    Unusual drowsiness or confusion    Swelling of your forehead or eyelids    Symptoms don't go away in 10 days    Vision problems, such as blurred or double vision    Fever of 100.4 F (38 C) or higher, or as directed by your healthcare provider  Call 911  Call 911 if any of these occur:     Seizure    Trouble breathing    Feeling dizzy or faint    Fingernails, skin or lips look blue, purple , or gray  Prevention  Here are steps you can take to help prevent an infection:     Keep good hand washing habits.    Don t have close contact with people who have sore throats, colds, or other upper respiratory infections.    Don t smoke, and stay away from secondhand smoke.    Stay up to date with of your vaccines.  [employer] last reviewed this educational content on 12/1/2019 2000-2021 [employer]. All rights reserved. This information is not intended as a substitute for professional medical care. Always follow your healthcare professional's instructions.

## 2021-09-18 ENCOUNTER — HEALTH MAINTENANCE LETTER (OUTPATIENT)
Age: 36
End: 2021-09-18

## 2021-10-06 ENCOUNTER — VIRTUAL VISIT (OUTPATIENT)
Dept: FAMILY MEDICINE | Facility: CLINIC | Age: 36
End: 2021-10-06
Payer: COMMERCIAL

## 2021-10-06 DIAGNOSIS — Z11.3 ROUTINE SCREENING FOR STI (SEXUALLY TRANSMITTED INFECTION): Primary | ICD-10-CM

## 2021-10-06 PROCEDURE — 99213 OFFICE O/P EST LOW 20 MIN: CPT | Mod: 95 | Performed by: NURSE PRACTITIONER

## 2021-10-06 NOTE — PROGRESS NOTES
Tram is a 36 year old who is being evaluated via a billable video visit.      How would you like to obtain your AVS? MyChart  If the video visit is dropped, the invitation should be resent by: Text to cell phone: [phone]  Will anyone else be joining your video visit? No    Video Start Time: 2:19 PM    Assessment & Plan     Routine screening for STI (sexually transmitted infection)  Currently asymptomatic.  Plan to do labs for further evaluation.  Safe sexual practices discussed.  - Treponema Abs w Reflex to RPR and Titer; Future  - HIV Antigen Antibody Combo; Future  - Hepatitis C Screen Reflex to HCV RNA Quant and Genotype; Future  - Neisseria gonorrhoeae PCR; Future  - Chlamydia trachomatis PCR; Future      Return in about 1 week (around 10/13/2021) for Lab Work.    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      Objective           Vitals:  No vitals were obtained today due to virtual visit.    Physical Exam   GENERAL: Healthy, alert and no distress  EYES: Eyes grossly normal to inspection.  No discharge or erythema, or obvious scleral/conjunctival abnormalities.  RESP: No audible wheeze, cough, or visible cyanosis.  No visible retractions or increased work of breathing.    SKIN: Visible skin clear. No significant rash, abnormal pigmentation or lesions.  NEURO: Cranial nerves grossly intact.  Mentation and speech appropriate for age.  PSYCH: Mentation appears normal, affect normal/bright, judgement and insight intact, normal  speech and appearance well-groomed.          Video-Visit Details    Type of service:  Video Visit    Video End Time:2:30    Originating Location (pt. Location): Home    Distant Location (provider location):  [facility]     Platform used for Video Visit: Leo

## 2021-10-11 DIAGNOSIS — Z30.44 ENCOUNTER FOR SURVEILLANCE OF VAGINAL RING HORMONAL CONTRACEPTIVE DEVICE: ICD-10-CM

## 2021-10-11 RX ORDER — ETONOGESTREL AND ETHINYL ESTRADIOL VAGINAL RING .015; .12 MG/D; MG/D
RING VAGINAL
Qty: 3 EACH | Refills: 3 | Status: SHIPPED | OUTPATIENT
Start: 2021-10-11 | End: 2022-12-23

## 2021-10-19 PROBLEM — F32.9 MAJOR DEPRESSION: Status: ACTIVE | Noted: 2018-10-28

## 2021-10-20 ENCOUNTER — LAB (OUTPATIENT)
Dept: LAB | Facility: CLINIC | Age: 36
End: 2021-10-20
Payer: COMMERCIAL

## 2021-10-20 DIAGNOSIS — Z11.3 ROUTINE SCREENING FOR STI (SEXUALLY TRANSMITTED INFECTION): ICD-10-CM

## 2021-10-20 LAB
HCV AB SERPL QL IA: NONREACTIVE
HIV 1+2 AB+HIV1 P24 AG SERPL QL IA: NONREACTIVE
T PALLIDUM AB SER QL: NONREACTIVE

## 2021-10-20 PROCEDURE — 87591 N.GONORRHOEAE DNA AMP PROB: CPT

## 2021-10-20 PROCEDURE — 87389 HIV-1 AG W/HIV-1&-2 AB AG IA: CPT

## 2021-10-20 PROCEDURE — 87491 CHLMYD TRACH DNA AMP PROBE: CPT

## 2021-10-20 PROCEDURE — 86780 TREPONEMA PALLIDUM: CPT

## 2021-10-20 PROCEDURE — 86803 HEPATITIS C AB TEST: CPT

## 2021-10-20 PROCEDURE — 36415 COLL VENOUS BLD VENIPUNCTURE: CPT

## 2021-10-21 LAB
C TRACH DNA SPEC QL NAA+PROBE: NEGATIVE
N GONORRHOEA DNA SPEC QL NAA+PROBE: NEGATIVE

## 2022-01-08 ENCOUNTER — HEALTH MAINTENANCE LETTER (OUTPATIENT)
Age: 37
End: 2022-01-08

## 2022-03-07 ENCOUNTER — TELEPHONE (OUTPATIENT)
Dept: SURGERY | Facility: CLINIC | Age: 37
End: 2022-03-07
Payer: COMMERCIAL

## 2022-03-07 NOTE — TELEPHONE ENCOUNTER
Per our medical record, patient had band 4/2011.  Last seen in clinic 3/5/2020 and was to RTC in 4-6 weeks for band assessment.    Returned patient call.  Patient states has moved from [place] to [place].  Has had no reason to be seen.  States weight is stable at 179# - same as what it was 2 years ago.     Reports since this weekend, anytime she eats or drinks anything, it hurts right away.  This lasts maybe couple of seconds  It occurs every time she takes a bite.    Has been taking TUMS and started taking Prevacid/lansoprazole in am the past couple of days since the pain started.  No relief received from the antacid.    Reports has been refluxing on/off for about a year.  Has taken lansoprazole for that too.   It helps with the reflux.    Nothing that she can think of that got stuck that may have precipitated the event.     Getting fluids in 48 ounces daily but not drinking as much as she usually does because it hurts when she drinks.    Is still able to eat solids at this time - maybe 1/2 cup per meal, less than usual.    Weight - same at 179# because this has only been going on for a few days.    Patient agreed that would benefit from some fluid taken out.    Will be seen at 1500 tomorrow.  Cynthia Zapata, MS, RD, RN

## 2022-03-07 NOTE — TELEPHONE ENCOUNTER
Pt called and stated that she is having some pain while eating. She would like to talk with a nurse about what to do.    Please call [phone]

## 2022-03-08 ENCOUNTER — OFFICE VISIT (OUTPATIENT)
Dept: SURGERY | Facility: CLINIC | Age: 37
End: 2022-03-08
Payer: COMMERCIAL

## 2022-03-08 VITALS
WEIGHT: 177.9 LBS | BODY MASS INDEX: 30.37 KG/M2 | DIASTOLIC BLOOD PRESSURE: 74 MMHG | SYSTOLIC BLOOD PRESSURE: 105 MMHG | HEART RATE: 82 BPM | OXYGEN SATURATION: 100 % | HEIGHT: 64 IN

## 2022-03-08 DIAGNOSIS — K21.9 GERD WITHOUT ESOPHAGITIS: ICD-10-CM

## 2022-03-08 DIAGNOSIS — Z46.51 FITTING AND ADJUSTMENT OF GASTRIC LAP BAND: ICD-10-CM

## 2022-03-08 DIAGNOSIS — Z98.84 LAP-BAND SURGERY STATUS: Primary | ICD-10-CM

## 2022-03-08 DIAGNOSIS — Z98.84 BARIATRIC SURGERY STATUS: ICD-10-CM

## 2022-03-08 PROCEDURE — 99207 PR NO CHARGE LOS: CPT | Performed by: PHYSICIAN ASSISTANT

## 2022-03-08 PROCEDURE — S2083 ADJUSTMENT GASTRIC BAND: HCPCS | Performed by: PHYSICIAN ASSISTANT

## 2022-03-08 RX ORDER — OMEPRAZOLE 40 MG/1
40 CAPSULE, DELAYED RELEASE ORAL DAILY
Qty: 45 CAPSULE | Refills: 0 | Status: SHIPPED | OUTPATIENT
Start: 2022-03-08 | End: 2022-04-26

## 2022-03-08 NOTE — PROGRESS NOTES
Band Assessment Visit      2022        Date: 3/8/2022  Name: Tram Bal  MR#: [id number]      : 1985      VITALS:          Weight: 177 lbs 14.4 oz         BMI: Body mass index is 30.78 kg/m .         Wt change since last visit (lbs): -5.8         Cumulative weight loss (lbs): 35.1       SUBJECTIVE:  Patient comes to the clinic today for band assessment.  In regards to the patient's band, the patient feels they need fluid removed from their band. Patient was seen last 3/5/2020. For the past 4 days has had pain with eating and drinking. It is a  stabbing pain that only last seconds after each bite.  Also has had reflux for more than a year. Worse in the morning. Has been taking lansoprazole as needed. This is at least monthly if not more often.  Admits to drinking 1-2 times weekly and will have 1-2 at a time.  Denies NSAID use or tobacco use. Minimal caffeine. Patient had similar pain 2017.       WEIGHT SELF ASSESSMENT:    3/8/2022   Are you satisfied with your weight or weight loss? No   Please check the boxes (1-3 boxes) which you think have been influencing your weight in the past month. The band is not in the right setting       CURRENT EXERCISE AND ACTIVITY:    3/8/2022   I am exercising 3x weekly or more.  No   Please select the statement that best describes your ability to exercise and be active in the past 4 weeks or since your last clinic visit: I should be more active but I just have not gotten around to it       BAND ROS:    3/8/2022   I am hungry between meals? Yes   I am eating between meals? Yes   I am eating > 1 cup of food at meals? No   I am not losing 1-2 lbs a week? Yes   I do not feel a sense of restriction? Yes         3/8/2022   I have pain when swallowing foods or liquids. Yes   I have heart burn, vomiting, or reflux. Yes - Had had reflux for about a year   I have night cough or hiccups. No   I am making poor food choices (smoothies, shakes, chips). No   I am unable to  eat chicken, steak, and bread. No         3/8/2022   Are you pregnant? No   Will you be traveling to remote areas? No   Will you be having major surgery soon? No       ASSESSMENT:    1.  S/P adjustable band surgery  2.  Malnutrition following GI Surgery      PLAN: After evaluation, we have elected to adjust her gastric band. Risk, benefits, and alternatives were reviewed before a consent was signed. Tram wishes to proceed and will follow up in prn for subsequent assessment.      PROCEDURE: Adjustment of gastric band performed by Nikita Winter PA-C      PROCEDURE DETAILS: In the clinic exam room, the patient was placed in supine position on the exam table. The area over the access port was prepped with an alcohol swab, gloves were donned, and a Hurley needle and syringe were directed into the port under palpation guidance. A small amount of saline was aspirated to verify location 0.4 mls was removed from the band.  This was tried a second time to make sure no other fluid was in band but this was it  Access needle was withdrawn and bandaid was applied. Patient sat up and drank 4 ounces of lukewarm water without difficulty. She should return to a liquid diet and advance as tolerated.     Discussed patient avoiding all behaviors that can cause irritation including alcohol. Ordered UGI without KUB to evaluate placement of the band and possibly any narrowing.  RX for omeprazole 40 mg  #45 sent to pharmacy. Patient advised if having severe pain to go to ED.     No charge due to procedure    Nikita Winter MS, PA-C

## 2022-03-08 NOTE — PROGRESS NOTES
Assisted with scheduling xr upper GI without KUB. Friday 11th at 2:00 PM. 1:45 check in. Do not eat, drink, chew gum, tobacco for 8 hours prior. Will call pt in AM to update. Will call scheduling tomorrow AM at 8:00 AM to see if they are able to get her in tomorrow.  Debby Kumar RN on 3/8/2022 at 4:32 PM

## 2022-03-09 ENCOUNTER — HOSPITAL ENCOUNTER (OUTPATIENT)
Dept: GENERAL RADIOLOGY | Facility: CLINIC | Age: 37
Discharge: HOME | End: 2022-03-09
Attending: PHYSICIAN ASSISTANT | Admitting: PHYSICIAN ASSISTANT
Payer: COMMERCIAL

## 2022-03-09 DIAGNOSIS — Z98.84 LAP-BAND SURGERY STATUS: ICD-10-CM

## 2022-03-09 PROCEDURE — 74240 X-RAY XM UPR GI TRC 1CNTRST: CPT

## 2022-03-11 ENCOUNTER — TELEPHONE (OUTPATIENT)
Dept: SURGERY | Facility: CLINIC | Age: 37
End: 2022-03-11
Payer: COMMERCIAL

## 2022-03-11 NOTE — TELEPHONE ENCOUNTER
Called pt per Nikita QUIROZ.  1) see how pt is doing  2) upper GI OK  3) taking omeprezole? Helping?  4) vomiting?  5) follow-up with MKD if doing better in 6-7 weeks. Otherwise sooner with RF.    No answer  Left VM to call clinic, number provided  Debby Kumar RN on 3/11/2022 at 3:28 PM      Pt returned call at 3:34 PM.  Pt reports she is doing a little better compared to Tuesday in the clinic but she is still experiencing sharp pain when eating anything that goes away afterwards. Pain is not a bad with drinking.    Informed pt upper GI looked OK and this was discussed between Nikita QUIROZ and .     Pt reports she is not taking Omeprazole. She is currently taking Lanzoprazole ([facility] brand) unsure of dose, 1x per day, was taking when she saw Nikita on Tuesday. She was not sure if she was suppose to take both? She is going to  Omeprazole today from pharmacy since she only has 2 tablets of lanzoprazole left.    Pt reports she is not vomiting.    Assisted with scheduling follow-up appt in 6-7 weeks with MKGLADIS. We can get her in sooner if she needs.    Pt wondered what the next steps were? She said the provider who did the upper GI said they may scope to look for ulcers?     Will forward to Nikita QUIROZ.    Debby Kumar RN on 3/11/2022 at 4:00 PM

## 2022-03-11 NOTE — TELEPHONE ENCOUNTER
Per Nikita QUIROZ.  Possible ulcer or irritation. Treatment is the PPI and avoid and irritants NSAIDS, alcohol, tobacco, caffeine. Can take 6-8 weeks to resolve.    Called pt to update on above. No answer  Left VM to call clinic. Will also send  msg.  Debby Kumar RN on 3/11/2022 at 4:07 PM

## 2022-03-28 ENCOUNTER — OFFICE VISIT (OUTPATIENT)
Dept: FAMILY MEDICINE | Facility: CLINIC | Age: 37
End: 2022-03-28
Payer: COMMERCIAL

## 2022-03-28 VITALS
HEART RATE: 83 BPM | BODY MASS INDEX: 31.66 KG/M2 | SYSTOLIC BLOOD PRESSURE: 127 MMHG | TEMPERATURE: 98.3 F | OXYGEN SATURATION: 98 % | DIASTOLIC BLOOD PRESSURE: 87 MMHG | WEIGHT: 183 LBS

## 2022-03-28 DIAGNOSIS — N94.9 VAGINAL LUMP: Primary | ICD-10-CM

## 2022-03-28 PROCEDURE — 99213 OFFICE O/P EST LOW 20 MIN: CPT | Performed by: NURSE PRACTITIONER

## 2022-03-28 ASSESSMENT — PAIN SCALES - GENERAL: PAINLEVEL: NO PAIN (0)

## 2022-03-28 NOTE — PATIENT INSTRUCTIONS
I think this is most likely an ingrown hair.  I would warm pack this area twice daily.  If not resolving in the next few weeks, let me know and we will have you see ob/gyn.  Sooner if worsening.

## 2022-04-13 ENCOUNTER — VIRTUAL VISIT (OUTPATIENT)
Dept: FAMILY MEDICINE | Facility: CLINIC | Age: 37
End: 2022-04-13
Payer: COMMERCIAL

## 2022-04-13 DIAGNOSIS — Z12.4 CERVICAL CANCER SCREENING: Primary | ICD-10-CM

## 2022-04-13 NOTE — PROGRESS NOTES
After discussion virtual appointment not appropriate, needs to be evaluated in person.  Appointment scheduled, if needed should follow up sooner.     DEON Torres CNP

## 2022-04-20 ENCOUNTER — OFFICE VISIT (OUTPATIENT)
Dept: FAMILY MEDICINE | Facility: CLINIC | Age: 37
End: 2022-04-20
Payer: COMMERCIAL

## 2022-04-20 VITALS
OXYGEN SATURATION: 99 % | RESPIRATION RATE: 22 BRPM | DIASTOLIC BLOOD PRESSURE: 88 MMHG | WEIGHT: 178 LBS | BODY MASS INDEX: 30.39 KG/M2 | HEART RATE: 88 BPM | SYSTOLIC BLOOD PRESSURE: 128 MMHG | HEIGHT: 64 IN | TEMPERATURE: 98.4 F

## 2022-04-20 DIAGNOSIS — F32.0 MILD MAJOR DEPRESSION (H): Primary | ICD-10-CM

## 2022-04-20 DIAGNOSIS — F41.1 GENERALIZED ANXIETY DISORDER: ICD-10-CM

## 2022-04-20 DIAGNOSIS — R00.2 PALPITATIONS: ICD-10-CM

## 2022-04-20 DIAGNOSIS — E61.1 IRON DEFICIENCY: ICD-10-CM

## 2022-04-20 DIAGNOSIS — E55.9 VITAMIN D DEFICIENCY: ICD-10-CM

## 2022-04-20 LAB
ALBUMIN SERPL-MCNC: 3.1 G/DL (ref 3.4–5)
ALP SERPL-CCNC: 71 U/L (ref 40–150)
ALT SERPL W P-5'-P-CCNC: 30 U/L (ref 0–50)
ANION GAP SERPL CALCULATED.3IONS-SCNC: 5 MMOL/L (ref 3–14)
AST SERPL W P-5'-P-CCNC: 16 U/L (ref 0–45)
BILIRUB SERPL-MCNC: 0.4 MG/DL (ref 0.2–1.3)
BUN SERPL-MCNC: 10 MG/DL (ref 7–30)
CALCIUM SERPL-MCNC: 9 MG/DL (ref 8.5–10.1)
CHLORIDE BLD-SCNC: 108 MMOL/L (ref 94–109)
CO2 SERPL-SCNC: 26 MMOL/L (ref 20–32)
CREAT SERPL-MCNC: 0.55 MG/DL (ref 0.52–1.04)
ERYTHROCYTE [DISTWIDTH] IN BLOOD BY AUTOMATED COUNT: 13.2 % (ref 10–15)
FERRITIN SERPL-MCNC: 18 NG/ML (ref 12–150)
GFR SERPL CREATININE-BSD FRML MDRD: >90 ML/MIN/1.73M2
GLUCOSE BLD-MCNC: 107 MG/DL (ref 70–99)
HCT VFR BLD AUTO: 39.2 % (ref 35–47)
HGB BLD-MCNC: 12.7 G/DL (ref 11.7–15.7)
MAGNESIUM SERPL-MCNC: 1.9 MG/DL (ref 1.6–2.3)
MCH RBC QN AUTO: 28.2 PG (ref 26.5–33)
MCHC RBC AUTO-ENTMCNC: 32.4 G/DL (ref 31.5–36.5)
MCV RBC AUTO: 87 FL (ref 78–100)
PLATELET # BLD AUTO: 466 10E3/UL (ref 150–450)
POTASSIUM BLD-SCNC: 4.1 MMOL/L (ref 3.4–5.3)
PROT SERPL-MCNC: 7.1 G/DL (ref 6.8–8.8)
RBC # BLD AUTO: 4.5 10E6/UL (ref 3.8–5.2)
SODIUM SERPL-SCNC: 139 MMOL/L (ref 133–144)
T4 FREE SERPL-MCNC: 1.25 NG/DL (ref 0.76–1.46)
TSH SERPL DL<=0.005 MIU/L-ACNC: <0.01 MU/L (ref 0.4–4)
VIT B12 SERPL-MCNC: 294 PG/ML (ref 193–986)
WBC # BLD AUTO: 6.8 10E3/UL (ref 4–11)

## 2022-04-20 PROCEDURE — 85027 COMPLETE CBC AUTOMATED: CPT | Performed by: NURSE PRACTITIONER

## 2022-04-20 PROCEDURE — 82728 ASSAY OF FERRITIN: CPT | Performed by: NURSE PRACTITIONER

## 2022-04-20 PROCEDURE — 80053 COMPREHEN METABOLIC PANEL: CPT | Performed by: NURSE PRACTITIONER

## 2022-04-20 PROCEDURE — 93000 ELECTROCARDIOGRAM COMPLETE: CPT | Performed by: NURSE PRACTITIONER

## 2022-04-20 PROCEDURE — 99214 OFFICE O/P EST MOD 30 MIN: CPT | Performed by: NURSE PRACTITIONER

## 2022-04-20 PROCEDURE — 83735 ASSAY OF MAGNESIUM: CPT | Performed by: NURSE PRACTITIONER

## 2022-04-20 PROCEDURE — 84439 ASSAY OF FREE THYROXINE: CPT | Performed by: NURSE PRACTITIONER

## 2022-04-20 PROCEDURE — 36415 COLL VENOUS BLD VENIPUNCTURE: CPT | Performed by: NURSE PRACTITIONER

## 2022-04-20 PROCEDURE — 82306 VITAMIN D 25 HYDROXY: CPT | Performed by: NURSE PRACTITIONER

## 2022-04-20 PROCEDURE — 84443 ASSAY THYROID STIM HORMONE: CPT | Performed by: NURSE PRACTITIONER

## 2022-04-20 PROCEDURE — 82607 VITAMIN B-12: CPT | Performed by: NURSE PRACTITIONER

## 2022-04-20 RX ORDER — FLUOXETINE 10 MG/1
CAPSULE ORAL
Qty: 60 CAPSULE | Refills: 1 | Status: SHIPPED | OUTPATIENT
Start: 2022-04-20 | End: 2022-06-21

## 2022-04-20 ASSESSMENT — ANXIETY QUESTIONNAIRES
3. WORRYING TOO MUCH ABOUT DIFFERENT THINGS: NEARLY EVERY DAY
5. BEING SO RESTLESS THAT IT IS HARD TO SIT STILL: NEARLY EVERY DAY
GAD7 TOTAL SCORE: 17
GAD7 TOTAL SCORE: 17
2. NOT BEING ABLE TO STOP OR CONTROL WORRYING: MORE THAN HALF THE DAYS
4. TROUBLE RELAXING: NEARLY EVERY DAY
7. FEELING AFRAID AS IF SOMETHING AWFUL MIGHT HAPPEN: MORE THAN HALF THE DAYS
7. FEELING AFRAID AS IF SOMETHING AWFUL MIGHT HAPPEN: MORE THAN HALF THE DAYS
1. FEELING NERVOUS, ANXIOUS, OR ON EDGE: NEARLY EVERY DAY
6. BECOMING EASILY ANNOYED OR IRRITABLE: SEVERAL DAYS
GAD7 TOTAL SCORE: 17

## 2022-04-20 ASSESSMENT — PATIENT HEALTH QUESTIONNAIRE - PHQ9
SUM OF ALL RESPONSES TO PHQ QUESTIONS 1-9: 18
SUM OF ALL RESPONSES TO PHQ QUESTIONS 1-9: 18
10. IF YOU CHECKED OFF ANY PROBLEMS, HOW DIFFICULT HAVE THESE PROBLEMS MADE IT FOR YOU TO DO YOUR WORK, TAKE CARE OF THINGS AT HOME, OR GET ALONG WITH OTHER PEOPLE: VERY DIFFICULT

## 2022-04-20 ASSESSMENT — PAIN SCALES - GENERAL: PAINLEVEL: NO PAIN (0)

## 2022-04-20 NOTE — PATIENT INSTRUCTIONS
Start the fluoxetine take 10 mg by mouth for 1-2 weeks then increase to 20 mg daily    Please call [phone] to schedule your event monitor    Labs today-we will notify you with those results    Recheck in 1-2 months with your physical

## 2022-04-20 NOTE — PROGRESS NOTES
Assessment & Plan     Mild major depression (H)  Not controlled.  Plan to start fluoxetine for symptoms.  Potential side effects discussed including but not limited to increased risk of self harm or suicide.  Recheck in 1-2 months, sooner if needed.   - FLUoxetine (PROZAC) 10 MG capsule; Start with 10 mg by mouth for 1-2 weeks then increase to 20 mg daily    Generalized anxiety disorder  Not controlled. Per above.   - FLUoxetine (PROZAC) 10 MG capsule; Start with 10 mg by mouth for 1-2 weeks then increase to 20 mg daily    Palpitations  No acute distress.  EKG completed today which was normal per DEON Torres CNP.  Labs and Zio patch pending for further evaluation.    - EKG 12-lead complete w/read - Clinics  - TSH with free T4 reflex; Future  - Comprehensive metabolic panel (BMP + Alb, Alk Phos, ALT, AST, Total. Bili, TP); Future  - CBC with platelets; Future  - Vitamin D Deficiency; Future  - Vitamin B12; Future  - Magnesium; Future  - Adult Leadless EKG Monitor 3 to 7 Days; Future  - Magnesium  - Vitamin B12  - Vitamin D Deficiency  - CBC with platelets  - Comprehensive metabolic panel (BMP + Alb, Alk Phos, ALT, AST, Total. Bili, TP)  - TSH with free T4 reflex    Vitamin D deficiency  Labs pending  - Vitamin D Deficiency; Future  - Vitamin D Deficiency    Iron deficiency  Labs pending  - Ferritin; Future  - Ferritin    Return in about 1 week (around 4/27/2022), or if symptoms worsen or fail to improve.    DEON Torres CNP  [facility]    Gael Ugalde is a 36 year old who presents for the following health issues     Heart Problem    History of Present Illness       Mental Health Follow-up:  Patient presents to follow-up on Depression & Anxiety.Patient's depression since last visit has been:  Medium  The patient is not having other symptoms associated with depression.  Patient's anxiety since last visit has been:  Worse  The patient is having other symptoms  "associated with anxiety.  Any significant life events: No  Patient is feeling anxious or having panic attacks.  Patient has no concerns about alcohol or drug use.       Today's PHQ-9         PHQ-9 Total Score: 18  PHQ-9 Q9 Thoughts of better off dead/self-harm past 2 weeks :   Not at all    How difficult have these problems made it for you to do your work, take care of things at home, or get along with other people: Very difficult    Today's CLARA-7 Score: 17    Vascular Disease:  She presents for follow up of vascular disease.  She never takes nitroglycerin. She is not taking daily aspirin.    She eats 2-3 servings of fruits and vegetables daily.She consumes 0 sweetened beverage(s) daily.She exercises with enough effort to increase her heart rate 9 or less minutes per day.  She exercises with enough effort to increase her heart rate 3 or less days per week.   She is taking medications regularly.     Racing heart-feels like it is going to pound out of chest  No known triggers  Two weeks now  Last night laying bed and rate was 107  Waking up from sleep    Review of Systems   Constitutional, HEENT, cardiovascular, pulmonary, gi and gu systems are negative, except as otherwise noted.      Objective    /88 (BP Location: Right arm, Patient Position: Sitting, Cuff Size: Adult Regular)   Pulse 88   Temp 98.4  F (36.9  C) (Tympanic)   Resp 22   Ht 1.619 m (5' 3.75\")   Wt 80.7 kg (178 lb)   LMP  (LMP Unknown)   SpO2 99%   Breastfeeding No   BMI 30.79 kg/m    Body mass index is 30.79 kg/m .  Physical Exam   GENERAL: healthy, alert and no distress  NECK: no adenopathy, no asymmetry, masses, or scars and thyroid normal to palpation  RESP: lungs clear to auscultation - no rales, rhonchi or wheezes  CV: regular rate and rhythm, normal S1 S2, no S3 or S4, no murmur  PSYCH: mentation appears normal, affect normal/bright    EKG - Reviewed and interpreted by me appears normal, NSR, normal axis, normal intervals, no acute " ST/T changes c/w ischemia, no LVH by voltage criteria

## 2022-04-21 DIAGNOSIS — R00.0 TACHYCARDIA: ICD-10-CM

## 2022-04-21 DIAGNOSIS — R79.89 LOW SERUM THYROID STIMULATING HORMONE (TSH): Primary | ICD-10-CM

## 2022-04-21 LAB — DEPRECATED CALCIDIOL+CALCIFEROL SERPL-MC: 29 UG/L (ref 20–75)

## 2022-04-21 RX ORDER — PROPRANOLOL HYDROCHLORIDE 20 MG/1
20 TABLET ORAL 3 TIMES DAILY
Qty: 90 TABLET | Refills: 3 | Status: SHIPPED | OUTPATIENT
Start: 2022-04-21 | End: 2022-06-07

## 2022-04-21 ASSESSMENT — ANXIETY QUESTIONNAIRES: GAD7 TOTAL SCORE: 17

## 2022-04-22 ENCOUNTER — MYC MEDICAL ADVICE (OUTPATIENT)
Dept: FAMILY MEDICINE | Facility: CLINIC | Age: 37
End: 2022-04-22
Payer: COMMERCIAL

## 2022-04-22 DIAGNOSIS — R79.89 LOW SERUM THYROID STIMULATING HORMONE (TSH): Primary | ICD-10-CM

## 2022-04-22 PROCEDURE — 99207 E-CONSULT TO ENDOCRINOLOGY (ADULT OUTPT PROVIDER TO SPECIALIST WRITTEN QUESTION & RESPONSE): CPT | Performed by: NURSE PRACTITIONER

## 2022-04-25 ENCOUNTER — TELEPHONE (OUTPATIENT)
Dept: SURGERY | Facility: CLINIC | Age: 37
End: 2022-04-25
Payer: COMMERCIAL

## 2022-04-25 NOTE — TELEPHONE ENCOUNTER
Medication; Omeprazole 40 mg capsules    Last Written Prescription Date:  03/08/2022  Last Fill Quantity: 45,  # refills: 0   Last office visit: 3/8/2022 with prescribing provider:  daisy edmonds   Future Office Visit:  05/17/2022        [id number] - [place], [place] - [address] [place] [address] [address] [facility] [address] [place] [address]

## 2022-04-25 NOTE — TELEPHONE ENCOUNTER
Called patient.  Scheduled for tomorrow 1200 to discuss next steps and need for omeprazole.  Cynthia Zapata, MS, RD, RN

## 2022-04-26 ENCOUNTER — VIRTUAL VISIT (OUTPATIENT)
Dept: SURGERY | Facility: CLINIC | Age: 37
End: 2022-04-26
Payer: COMMERCIAL

## 2022-04-26 ENCOUNTER — E-CONSULT (OUTPATIENT)
Dept: ENDOCRINOLOGY | Facility: CLINIC | Age: 37
End: 2022-04-26

## 2022-04-26 VITALS — BODY MASS INDEX: 30.39 KG/M2 | HEIGHT: 64 IN | WEIGHT: 178 LBS

## 2022-04-26 DIAGNOSIS — Z98.84 LAP-BAND SURGERY STATUS: ICD-10-CM

## 2022-04-26 DIAGNOSIS — K21.9 GERD WITHOUT ESOPHAGITIS: ICD-10-CM

## 2022-04-26 PROCEDURE — 99212 OFFICE O/P EST SF 10 MIN: CPT | Mod: 95 | Performed by: PHYSICIAN ASSISTANT

## 2022-04-26 PROCEDURE — 99207 PR NO BILLABLE SERVICE THIS VISIT: CPT

## 2022-04-26 RX ORDER — OMEPRAZOLE 40 MG/1
40 CAPSULE, DELAYED RELEASE ORAL DAILY
Qty: 90 CAPSULE | Refills: 0 | Status: SHIPPED | OUTPATIENT
Start: 2022-04-26 | End: 2022-11-09

## 2022-04-26 NOTE — PROGRESS NOTES
"Tram is a 36 year old who is being evaluated via a billable video visit.      If the video visit is dropped, the invitation should be resent by: Text to cell phone: [phone]  Will anyone else be joining your video visit? No      Video-Visit Details    Type of service:  Video Visit    Video Start Time: 12:07    Video End Time: 12:13    17 minutes spent on the date of the encounter doing chart review, review of test results, patient visit and documentation       Originating Location (pt. Location): Home    Distant Location (provider location):  [facility] [place]     Platform used for Video Visit: [email or web address]            VIRTUAL BARIATRIC FOLLOW UP BAND VISIT         April 26, 2022       HISTORY OF PRESENT ILLNESS: Pt returns today for her follow-up appointment status post adjustable gastric band surgery. She was seen last month for concerns about reflux and pain with eating and drinking. Has since had UGI that was WNL. Reports she is feeling much better now. No heartburn or abdominal pain. Continues to take the omeprazole 40 mg capsules every morning. Prior to that was taking lansoprazole for a few months. Has just a few of the omeprazole left.   Has reduced her alcohol use and is now drinking couple times monthly. Having 2-3 beverages at a time.  No NSAID or nicotine use.  No caffeine.     All her fluid was removed at the last visit and she feels she has good portion sizes.  Weight has remained stable.       BARIATRIC METRICS:  Current Weight: 178 lb (80.7 kg) (pt reported)  Body mass index is 30.79 kg/m .   Wt change since last visit (lbs): 0  Cumulative weight loss (lbs): 35        REVIEW OF SYSTEMS:     GI:  Nausea- No  Vomiting- No  Abdominal Pain- No  Heartburn- No         LABS/IMAGING/MEDICAL RECORDS REVIEW: [facility]      PHYSICAL EXAMINATION:   Ht 5' 3.75\" (1.619 m)   Wt 178 lb (80.7 kg)   LMP  (LMP Unknown)   BMI 30.79 kg/m    GENERAL: Healthy, alert and no distress  EYES: " Eyes grossly normal to inspection.  No discharge or erythema, or obvious scleral/conjunctival abnormalities.  RESP: No audible wheeze, cough, or visible cyanosis.  No visible retractions or increased work of breathing.    SKIN: Visible skin clear. No significant rash, abnormal pigmentation or lesions.  NEURO: Cranial nerves grossly intact.  Mentation and speech appropriate for age.  PSYCH: Mentation appears normal, affect normal/bright, judgement and insight intact, normal speech and appearance well-groomed.      ASSESSMENT AND PLAN:    Status Post Lap-Band surgery status  GERD without esophagitis        Will send over 3 month supply of omeprazole 40 mg capsules. Ask that patient try a few days without to see if she still needs them.  Happy with having no fluid in her band.      Will follow up for annual or prn      Nikita Winter MS, GABRIELLA

## 2022-04-27 ENCOUNTER — MYC MEDICAL ADVICE (OUTPATIENT)
Dept: FAMILY MEDICINE | Facility: CLINIC | Age: 37
End: 2022-04-27
Payer: COMMERCIAL

## 2022-04-27 DIAGNOSIS — R79.89 LOW SERUM THYROID STIMULATING HORMONE (TSH): Primary | ICD-10-CM

## 2022-04-27 NOTE — PROGRESS NOTES
ALL SMARTFIELDS MUST BE COMPLETED FOR PATIENT CARE AND BILLING    4/26/2022     E-Consult has been denied due to: Doesn't meet criteria for E-Consult - Did not include a specific clinical question, or no question provided.    Interprofessional consultation requested by:  Trudi Guevara APRN CNP      Clinical Question/Purpose: MY CLINICAL QUESTION IS: low TSH with tachycardia and palpations    Patient assessment and information reviewed:   ENDO THYROID LABS-[facility] Latest Ref Rng & Units 4/20/2022 10/26/2018   TSH 0.40 - 4.00 mU/L <0.01 (L) 1.20   FREE T4 0.76 - 1.46 ng/dL 1.25      [facility] Latest Ref Rng & Units 3/14/2016 8/9/2010   TSH 0.40 - 4.00 mU/L 1.43 1.01   FREE T4 0.76 - 1.46 ng/dL       [facility] Latest Ref Rng & Units 4/19/2010   TSH 0.40 - 4.00 mU/L 1.34   FREE T4 0.76 - 1.46 ng/dL        Recommendations:   Repeat the labs including TSH, free T4, total T3, thyroid stimulating immunoglobulin .   Let me know if the results are abnormal.     The recommendations provided in this E-Consult are based on a review of clinical data pertinent to the clinical question presented, without a review of the patient's complete medical record or, the benefit of a comprehensive in-person or virtual patient evaluation. This consultation should not replace the clinical judgement and evaluation of the provider ordering this E-Consult. Any new clinical issues, or changes in patient status since the filing of this E-Consult will need to be taken into account when assessing these recommendations. Please contact me if you have further questions.    My total time spent reviewing clinical information and formulating assessment was 5 minutes.    Report sent automatically to requesting provider once signed.     Jacquelyn López MD

## 2022-04-29 ENCOUNTER — LAB (OUTPATIENT)
Dept: LAB | Facility: CLINIC | Age: 37
End: 2022-04-29

## 2022-04-29 DIAGNOSIS — R79.89 LOW SERUM THYROID STIMULATING HORMONE (TSH): ICD-10-CM

## 2022-04-29 LAB
T3 SERPL-MCNC: 192 NG/DL (ref 60–181)
T4 FREE SERPL-MCNC: 1.25 NG/DL (ref 0.76–1.46)
TSH SERPL DL<=0.005 MIU/L-ACNC: 0.01 MU/L (ref 0.4–4)

## 2022-04-29 PROCEDURE — 99000 SPECIMEN HANDLING OFFICE-LAB: CPT

## 2022-04-29 PROCEDURE — 84480 ASSAY TRIIODOTHYRONINE (T3): CPT

## 2022-04-29 PROCEDURE — 36415 COLL VENOUS BLD VENIPUNCTURE: CPT

## 2022-04-29 PROCEDURE — 84439 ASSAY OF FREE THYROXINE: CPT

## 2022-04-29 PROCEDURE — 84443 ASSAY THYROID STIM HORMONE: CPT

## 2022-04-29 PROCEDURE — 84445 ASSAY OF TSI GLOBULIN: CPT | Mod: 90

## 2022-05-05 LAB — TSI SER-ACNC: 1.4 TSI INDEX

## 2022-05-06 ENCOUNTER — TELEPHONE (OUTPATIENT)
Dept: ENDOCRINOLOGY | Facility: CLINIC | Age: 37
End: 2022-05-06

## 2022-05-06 NOTE — TELEPHONE ENCOUNTER
To Trudi CHAVARRIA CNP: Please submit official  referral to endocrine.    To schedulers : please schedule with consult service (or open new/THADDEUS) within 2weeks This could be a virtual visit.      Jacquelyn López MD  Endocrine triage

## 2022-05-06 NOTE — TELEPHONE ENCOUNTER
----- Message from DEON Torres CNP sent at 5/6/2022  7:50 AM CDT -----  Hi Dr. López,     I sent an E Consult on this patient and you recommended additional labs, which have been completed and were abnormal.     Thanks,     DEON Torres CNP

## 2022-05-16 ENCOUNTER — MYC MEDICAL ADVICE (OUTPATIENT)
Dept: FAMILY MEDICINE | Facility: CLINIC | Age: 37
End: 2022-05-16

## 2022-05-17 ENCOUNTER — E-VISIT (OUTPATIENT)
Dept: FAMILY MEDICINE | Facility: CLINIC | Age: 37
End: 2022-05-17
Payer: COMMERCIAL

## 2022-05-17 DIAGNOSIS — M54.6 ACUTE THORACIC BACK PAIN, UNSPECIFIED BACK PAIN LATERALITY: Primary | ICD-10-CM

## 2022-05-17 PROCEDURE — 99421 OL DIG E/M SVC 5-10 MIN: CPT | Performed by: NURSE PRACTITIONER

## 2022-05-17 RX ORDER — KETOROLAC TROMETHAMINE 10 MG/1
10 TABLET, FILM COATED ORAL EVERY 6 HOURS PRN
Qty: 20 TABLET | Refills: 0 | Status: SHIPPED | OUTPATIENT
Start: 2022-05-17 | End: 2022-10-10

## 2022-05-17 NOTE — PATIENT INSTRUCTIONS
Thank you for choosing us for your care. I have placed an order for a prescription so that you can start treatment. View your full visit summary for details by clicking on the link below. Your pharmacist will able to address any questions you may have about the medication.      If you're not feeling better within 2-3 weeks please schedule an appointment.  You can schedule an appointment right here in [facility], or call [phone]  If the visit is for the same symptoms as your eVisit, we'll refund the cost of your eVisit if seen within seven days.

## 2022-05-20 RX ORDER — CYCLOBENZAPRINE HCL 10 MG
5-10 TABLET ORAL 3 TIMES DAILY PRN
Qty: 20 TABLET | Refills: 1 | Status: SHIPPED | OUTPATIENT
Start: 2022-05-20 | End: 2023-07-25

## 2022-06-01 NOTE — TELEPHONE ENCOUNTER
RECORDS RECEIVED FROM: internal    DATE RECEIVED: 6/7/22    NOTES (FOR ALL VISITS) STATUS DETAILS   OFFICE NOTES from referring provider internal  Trudi Guevara APRN CNP   OFFICE NOTES from other specialist     ED NOTES     OPERATIVE REPORT  (thyroid, pituitary, adrenal, parathyroid)     MEDICATION LIST internal     IMAGING        XR (Chest) internal  10/10/20     LABS     DIABETES: HBGA1C, CREATININE, FASTING LIPIDS, MICROALBUMIN URINE, POTASSIUM, TSH, T4    THYROID: TSH, T4, CBC, THYRODLONULIN, TOTAL T3, FREE T4, CALCITONIN, CEA internal  T3- 4.29.22  T4-4.29.22  TSH-4.29.22  Cbc- 4.20.22  Vitamin D- 4.20.22

## 2022-06-07 ENCOUNTER — VIRTUAL VISIT (OUTPATIENT)
Dept: ENDOCRINOLOGY | Facility: CLINIC | Age: 37
End: 2022-06-07
Attending: NURSE PRACTITIONER
Payer: MEDICAID

## 2022-06-07 ENCOUNTER — TELEPHONE (OUTPATIENT)
Dept: ENDOCRINOLOGY | Facility: CLINIC | Age: 37
End: 2022-06-07

## 2022-06-07 ENCOUNTER — PRE VISIT (OUTPATIENT)
Dept: ENDOCRINOLOGY | Facility: CLINIC | Age: 37
End: 2022-06-07

## 2022-06-07 DIAGNOSIS — E05.00 GRAVES DISEASE: Primary | ICD-10-CM

## 2022-06-07 DIAGNOSIS — R79.89 LOW SERUM THYROID STIMULATING HORMONE (TSH): ICD-10-CM

## 2022-06-07 DIAGNOSIS — R00.0 TACHYCARDIA: ICD-10-CM

## 2022-06-07 PROCEDURE — 99204 OFFICE O/P NEW MOD 45 MIN: CPT | Mod: GT | Performed by: INTERNAL MEDICINE

## 2022-06-07 RX ORDER — PROPRANOLOL HCL 60 MG
60 CAPSULE, EXTENDED RELEASE 24HR ORAL DAILY
Qty: 30 CAPSULE | Refills: 11 | Status: SHIPPED | OUTPATIENT
Start: 2022-06-07 | End: 2022-07-21

## 2022-06-07 NOTE — PATIENT INSTRUCTIONS
Get blood work done    Stop the propranolol 20 mg thrice daily    Start propranolol  60 mg extended release tablet, take 1 tab daily    Based on your labs we will get back to you on whether to start methimazole    If started on methimazole we will check blood test in 4-6 weeks on regular basis for first 1 year

## 2022-06-07 NOTE — NURSING NOTE
Pt states there are no changes to their allergy and medication list since last reviewed on 4/18/22. Vanessa Rodrigues on 6/7/2022 at 8:56 AM

## 2022-06-07 NOTE — TELEPHONE ENCOUNTER
Attempted to reach patient to schedule follow up in the Endocrinology Clinic. No answer, LM on VM to call office back.    Schedule follow up in 4-6 weeks with Dr Gilbert or Dr Roman or any available provider in person. Vanessa Rodrigues on 6/7/2022 at 12:59 PM

## 2022-06-07 NOTE — LETTER
6/7/2022       RE: Tram Bal  [address] [facility] [address]     Dear Colleague,    Thank you for referring your patient, Tram aBl, to the [facility] ENDOCRINOLOGY [facility] [place] [facility]. Please see a copy of my visit note below.    Tram Bal  is being evaluated via a billable video visit.      How would you like to obtain your AVS? [employer]  For the video visit, send the invitation by: Text to cell phone: [phone]  Will anyone else be joining your video visit? No        Endocrinology Clinic New Consult Video viist  Time visit started: 9 AM  Time visit  Ended: 9: 37 AM  Location of patient: home  Location of provider: [place]  Platform: Juany       Tram Bal MRN:[id number] YOB: 1985  Primary care provider: Trudi Guevara     Reason for Endocrine consult: Low TSH and tachycardia    HPI:  Tram Bal is a 36 year old female with  PMH of generalized anxiety disorder, depression, status post adjustable gastric band surgery and GERD is referred to our clinic for suppressed TSH.    She was evaluated in 4/2022 by her PCP for palpitations and was found to have suppressed TSh < 0.01, free T4: 1.25, tot T3: 192 with TSI: 1.4. This is consistent with Grave's disease. She was started on oral propranolol 20 mg TID which has helped with her palpitations partially    She has anxiety at baseline that has gotten worse, has palpitations, denies any dyspnea on exertion, swelling of feet, positive for sweating and heat intolerance, has difficulty falling asleep or staying asleep for 2 months. Denies any tremor,bowel changes, unintentional weight loss, she is on nuva ring so she has regular periods. No plans for pregnancy.    She reports having seasonal allergies in the past but does have more itchiness recently and blurring of vision.  Denies eye redness, swelling,pain or double vision    Denies any Lumps or  bumps in neck or difficulty breathing    Any OTC medications, biotin supplements. Take multivitamin with iron    FH: no family history of thyroid, diabetes or autoimmune disorders    SH: No smoking history, social alcohol consumption, no illicit drug use. Single, no plans for kids.      ROS:  All 12 systems were reviewed and negative except as mentioned in HPI    Past Medical/Surgical History:  Past Medical History:   Diagnosis Date     Abnormal Pap smear of cervix 2004    2005, 2006, 2010, 2016, 2017     Cervical high risk HPV (human papillomavirus) test positive 2017 2018, 2020     Depressive disorder 2016     Displacement of lumbar intervertebral disc 05/15/2008     Esophageal reflux      Hx of colposcopy with cervical biopsy 05/22/2017 5/22/17:[place]- Benign.      Past Surgical History:   Procedure Laterality Date     COLPOSCOPY,LOOP ELECTRD CERVIX EXCIS  08/11/2005    JARETH III     GI SURGERY  04/14/11    LAP BANDING for obesity, pre-DM     ZZC NONSPECIFIC PROCEDURE  1999    Oral surgery       Allergies:  Allergies   Allergen Reactions     Famvir [Famciclovir] Hives       PTA Meds:  Prior to Admission medications    Medication Sig Last Dose Taking? Auth Provider   cyclobenzaprine (FLEXERIL) 10 MG tablet Take 0.5-1 tablets (5-10 mg) by mouth 3 times daily as needed for muscle spasms   Trudi Guevara APRN CNP   etonogestrel-ethinyl estradiol (NUVARING) 0.12-0.015 MG/24HR vaginal ring INSERT ONE RING VAGINALLY EVERY 21 DAYS THEN REMOVE FOR ONE WEEK THEN REPEAT WITH NEW RING   Trudi Guevara APRN CNP   FLUoxetine (PROZAC) 10 MG capsule Start with 10 mg by mouth for 1-2 weeks then increase to 20 mg daily   Trudi Guevara APRN CNP   ketorolac (TORADOL) 10 MG tablet Take 1 tablet (10 mg) by mouth every 6 hours as needed for moderate pain -take with food   Trudi Guevara APRN CNP   Multiple Vitamins-Iron TABS Take by mouth daily    Reported, Patient   omeprazole (PRILOSEC) 40 MG DR capsule Take 1 capsule (40  mg) by mouth daily   Nikita Winter PA-C   propranolol (INDERAL) 20 MG tablet Take 1 tablet (20 mg) by mouth 3 times daily   Trudi Guevara APRN CNP        Current heard:   Current Outpatient Medications   Medication     cyclobenzaprine (FLEXERIL) 10 MG tablet     etonogestrel-ethinyl estradiol (NUVARING) 0.12-0.015 MG/24HR vaginal ring     FLUoxetine (PROZAC) 10 MG capsule     ketorolac (TORADOL) 10 MG tablet     Multiple Vitamins-Iron TABS     omeprazole (PRILOSEC) 40 MG DR capsule     propranolol (INDERAL) 20 MG tablet     No current facility-administered medications for this visit.       Family History:  Family History   Problem Relation Age of Onset     Thyroid Disease Mother      Unknown/Adopted Mother      Hyperlipidemia Father      Hypertension Paternal Grandmother      Hyperlipidemia Paternal Grandmother      Diabetes Paternal Aunt      Hypertension Paternal Aunt      Lipids Paternal Aunt      Thyroid Disease Maternal Half-Brother      Unknown/Adopted No family hx of         unaware of maternal grandparents history as pt mom was adopted     Breast Cancer No family hx of      Cancer - colorectal No family hx of      Gynecology No family hx of         no ovarian cancer     Coronary Artery Disease No family hx of      Cerebrovascular Disease No family hx of      Thyroid Disease No family hx of      Osteoporosis No family hx of        Social History:  Social History     Tobacco Use     Smoking status: Former Smoker     Packs/day: 0.00     Years: 3.00     Pack years: 0.00     Quit date: 2010     Years since quittin.1     Smokeless tobacco: Never Used     Tobacco comment: quit smoking may 2010    Substance Use Topics     Alcohol use: Yes     Comment: occasionally         Physical examination:  General appearance: seated comfortably in the chair during assessment. Not in any acute distress  HEENT: No obvious proptosis  Lungs: Speaking full sentences  Neurological: conscious and oriented. Speech:  normal.   Extremities: No tremor is noted over her outstretched hands  Psychiatric: normal mood and affect. Normal judgment    Endocrine Labs:  ENDO THYROID LABS-[facility] Latest Ref Rng & Units 4/29/2022 4/20/2022   TSH 0.40 - 4.00 mU/L 0.01 (L) <0.01 (L)   T3 60 - 181 ng/dL 192 (H)    FREE T4 0.76 - 1.46 ng/dL 1.25 1.25   THYROID STIM IMMUNOG <=1.3 TSI index 1.4 (H)      ENDO THYROID LABS-[facility] Latest Ref Rng & Units 10/26/2018   TSH 0.40 - 4.00 mU/L 1.20   T3 60 - 181 ng/dL    FREE T4 0.76 - 1.46 ng/dL    THYROID STIM IMMUNOG <=1.3 TSI index           Assessment and Plan:     Tram Bal is a 36 year old female with  PMH of generalized anxiety disorder, depression, status post adjustable gastric band surgery and GERD is referred to our clinic for suppressed TSH.    #  Graves' disease, TSI positive, mild hyperthyroidism.   She is on propranolol 20 mg 3 times daily with partial improvement in palpitations, has heat intolerance, sweating and difficulty falling asleep with worsening of her anxiety.  She has no plans for pregnancy.  Does have itching of the eye which she thinks is related to seasonal allergies    -We will recheck TSH, free T4, total T3  -We discussed that 30% of Graves' disease can remain hyperthyroid throughout the life, 30% can become euthyroid, 30% can become hypothyroid  -We discussed treatment options including antithyroid medications like methimazole, radioactive iodine therapy and thyroidectomy.  Discussed the rationale risks and benefits for each.  Patient wants to start with oral medications.  -Changed propranolol from 20 mg 3 times daily to 60 mg extended release once daily  -Based on her labs if her TSH is suppressed with elevated free T4 or total T3 will initiate her on methimazole discussed the side effects like neutropenia and liver dysfunction.    Return to clinic in 4 to 6 weeks in person    The patient was seen, examined and discussed with Dr. Jessie Gilbert,  MD.  Endocrinology fellow     I have seen and examined the patient, reviewed and edited the fellow's note, and agree with the plan of care.  JAVED De La Paz

## 2022-06-07 NOTE — PROGRESS NOTES
Tram Bal  is being evaluated via a billable video visit.      How would you like to obtain your AVS? [employer]  For the video visit, send the invitation by: Text to cell phone: [phone]  Will anyone else be joining your video visit? No        Endocrinology Clinic New Consult Video viist  Time visit started: 9 AM  Time visit  Ended: 9: 37 AM  Location of patient: home  Location of provider: [place]  Platform: Photobucket       Tram Bal MRN:[id number] YOB: 1985  Primary care provider: Trudi Guevara     Reason for Endocrine consult: Low TSH and tachycardia    HPI:  Tram Bal is a 36 year old female with  PMH of generalized anxiety disorder, depression, status post adjustable gastric band surgery and GERD is referred to our clinic for suppressed TSH.    She was evaluated in 4/2022 by her PCP for palpitations and was found to have suppressed TSh < 0.01, free T4: 1.25, tot T3: 192 with TSI: 1.4. This is consistent with Grave's disease. She was started on oral propranolol 20 mg TID which has helped with her palpitations partially    She has anxiety at baseline that has gotten worse, has palpitations, denies any dyspnea on exertion, swelling of feet, positive for sweating and heat intolerance, has difficulty falling asleep or staying asleep for 2 months. Denies any tremor,bowel changes, unintentional weight loss, she is on nuva ring so she has regular periods. No plans for pregnancy.    She reports having seasonal allergies in the past but does have more itchiness recently and blurring of vision.  Denies eye redness, swelling,pain or double vision    Denies any Lumps or bumps in neck or difficulty breathing    Any OTC medications, biotin supplements. Take multivitamin with iron    FH: no family history of thyroid, diabetes or autoimmune disorders    SH: No smoking history, social alcohol consumption, no illicit drug use. Single, no plans for kids.      ROS:  All 12 systems were  reviewed and negative except as mentioned in HPI    Past Medical/Surgical History:  Past Medical History:   Diagnosis Date     Abnormal Pap smear of cervix 2004    2005, 2006, 2010, 2016, 2017     Cervical high risk HPV (human papillomavirus) test positive 2017 2018, 2020     Depressive disorder 2016     Displacement of lumbar intervertebral disc 05/15/2008     Esophageal reflux      Hx of colposcopy with cervical biopsy 05/22/2017 5/22/17:[place]- Benign.      Past Surgical History:   Procedure Laterality Date     COLPOSCOPY,LOOP ELECTRD CERVIX EXCIS  08/11/2005    JARETH III     GI SURGERY  04/14/11    LAP BANDING for obesity, pre-DM     ZZC NONSPECIFIC PROCEDURE  1999    Oral surgery       Allergies:  Allergies   Allergen Reactions     Famvir [Famciclovir] Hives       PTA Meds:  Prior to Admission medications    Medication Sig Last Dose Taking? Auth Provider   cyclobenzaprine (FLEXERIL) 10 MG tablet Take 0.5-1 tablets (5-10 mg) by mouth 3 times daily as needed for muscle spasms   Trudi Guevara APRN CNP   etonogestrel-ethinyl estradiol (NUVARING) 0.12-0.015 MG/24HR vaginal ring INSERT ONE RING VAGINALLY EVERY 21 DAYS THEN REMOVE FOR ONE WEEK THEN REPEAT WITH NEW RING   Trudi Guevara APRN CNP   FLUoxetine (PROZAC) 10 MG capsule Start with 10 mg by mouth for 1-2 weeks then increase to 20 mg daily   Trudi Guevara APRN CNP   ketorolac (TORADOL) 10 MG tablet Take 1 tablet (10 mg) by mouth every 6 hours as needed for moderate pain -take with food   Trudi Guevara APRN CNP   Multiple Vitamins-Iron TABS Take by mouth daily    Reported, Patient   omeprazole (PRILOSEC) 40 MG DR capsule Take 1 capsule (40 mg) by mouth daily   Nikita Winter PA-C   propranolol (INDERAL) 20 MG tablet Take 1 tablet (20 mg) by mouth 3 times daily   Trudi Guevara APRN CNP        Current heard:   Current Outpatient Medications   Medication     cyclobenzaprine (FLEXERIL) 10 MG tablet     etonogestrel-ethinyl estradiol  (NUVARING) 0.12-0.015 MG/24HR vaginal ring     FLUoxetine (PROZAC) 10 MG capsule     ketorolac (TORADOL) 10 MG tablet     Multiple Vitamins-Iron TABS     omeprazole (PRILOSEC) 40 MG DR capsule     propranolol (INDERAL) 20 MG tablet     No current facility-administered medications for this visit.       Family History:  Family History   Problem Relation Age of Onset     Thyroid Disease Mother      Unknown/Adopted Mother      Hyperlipidemia Father      Hypertension Paternal Grandmother      Hyperlipidemia Paternal Grandmother      Diabetes Paternal Aunt      Hypertension Paternal Aunt      Lipids Paternal Aunt      Thyroid Disease Maternal Half-Brother      Unknown/Adopted No family hx of         unaware of maternal grandparents history as pt mom was adopted     Breast Cancer No family hx of      Cancer - colorectal No family hx of      Gynecology No family hx of         no ovarian cancer     Coronary Artery Disease No family hx of      Cerebrovascular Disease No family hx of      Thyroid Disease No family hx of      Osteoporosis No family hx of        Social History:  Social History     Tobacco Use     Smoking status: Former Smoker     Packs/day: 0.00     Years: 3.00     Pack years: 0.00     Quit date: 2010     Years since quittin.1     Smokeless tobacco: Never Used     Tobacco comment: quit smoking may 2010    Substance Use Topics     Alcohol use: Yes     Comment: occasionally         Physical examination:  General appearance: seated comfortably in the chair during assessment. Not in any acute distress  HEENT: No obvious proptosis  Lungs: Speaking full sentences  Neurological: conscious and oriented. Speech: normal.   Extremities: No tremor is noted over her outstretched hands  Psychiatric: normal mood and affect. Normal judgment    Endocrine Labs:  ENDO THYROID LABS-[facility] Latest Ref Rng & Units 2022   TSH 0.40 - 4.00 mU/L 0.01 (L) <0.01 (L)   T3 60 - 181 ng/dL 192 (H)    FREE T4 0.76 - 1.46  ng/dL 1.25 1.25   THYROID STIM IMMUNOG <=1.3 TSI index 1.4 (H)      ENDO THYROID LABS-[facility] Latest Ref Rng & Units 10/26/2018   TSH 0.40 - 4.00 mU/L 1.20   T3 60 - 181 ng/dL    FREE T4 0.76 - 1.46 ng/dL    THYROID STIM IMMUNOG <=1.3 TSI index           Assessment and Plan:     Tram Bal is a 36 year old female with  PMH of generalized anxiety disorder, depression, status post adjustable gastric band surgery and GERD is referred to our clinic for suppressed TSH.    #  Graves' disease, TSI positive, mild hyperthyroidism.   She is on propranolol 20 mg 3 times daily with partial improvement in palpitations, has heat intolerance, sweating and difficulty falling asleep with worsening of her anxiety.  She has no plans for pregnancy.  Does have itching of the eye which she thinks is related to seasonal allergies    -We will recheck TSH, free T4, total T3  -We discussed that 30% of Graves' disease can remain hyperthyroid throughout the life, 30% can become euthyroid, 30% can become hypothyroid  -We discussed treatment options including antithyroid medications like methimazole, radioactive iodine therapy and thyroidectomy.  Discussed the rationale risks and benefits for each.  Patient wants to start with oral medications.  -Changed propranolol from 20 mg 3 times daily to 60 mg extended release once daily  -Based on her labs if her TSH is suppressed with elevated free T4 or total T3 will initiate her on methimazole discussed the side effects like neutropenia and liver dysfunction.    Return to clinic in 4 to 6 weeks in person    The patient was seen, examined and discussed with Dr. Jessie Gilbert MD.  Endocrinology fellow     I have seen and examined the patient, reviewed and edited the fellow's note, and agree with the plan of care.  JAVED De La Paz

## 2022-06-08 ENCOUNTER — LAB (OUTPATIENT)
Dept: LAB | Facility: CLINIC | Age: 37
End: 2022-06-08
Payer: MEDICAID

## 2022-06-08 DIAGNOSIS — E05.00 GRAVES DISEASE: ICD-10-CM

## 2022-06-08 LAB
T3 SERPL-MCNC: 190 NG/DL (ref 60–181)
T4 FREE SERPL-MCNC: 1.25 NG/DL (ref 0.76–1.46)
TSH SERPL DL<=0.005 MIU/L-ACNC: <0.01 MU/L (ref 0.4–4)

## 2022-06-08 PROCEDURE — 84443 ASSAY THYROID STIM HORMONE: CPT

## 2022-06-08 PROCEDURE — 84480 ASSAY TRIIODOTHYRONINE (T3): CPT

## 2022-06-08 PROCEDURE — 84439 ASSAY OF FREE THYROXINE: CPT

## 2022-06-08 PROCEDURE — 36415 COLL VENOUS BLD VENIPUNCTURE: CPT

## 2022-06-09 ENCOUNTER — TELEPHONE (OUTPATIENT)
Dept: ENDOCRINOLOGY | Facility: CLINIC | Age: 37
End: 2022-06-09
Payer: MEDICAID

## 2022-06-09 DIAGNOSIS — E05.00 GRAVES DISEASE: Primary | ICD-10-CM

## 2022-06-09 RX ORDER — METHIMAZOLE 5 MG/1
5 TABLET ORAL DAILY
Qty: 30 TABLET | Refills: 3 | Status: SHIPPED | OUTPATIENT
Start: 2022-06-09 | End: 2022-07-21

## 2022-06-09 NOTE — TELEPHONE ENCOUNTER
TSH undetectable with free T 4: 1.25 and tot t3: 190, TSI positive    We will start her on methimazole 5 mg daily, repeat TFT, cbc, lft in 4 weeks - routed to me     Has an appt with Dr Shanks on 7/21/2022    Spoke with patient over the phone     D/w Dr Jessie Gilbert MD.  Endocrinology fellow

## 2022-06-14 ENCOUNTER — PATIENT OUTREACH (OUTPATIENT)
Dept: FAMILY MEDICINE | Facility: CLINIC | Age: 37
End: 2022-06-14
Payer: MEDICAID

## 2022-06-16 DIAGNOSIS — F32.0 MILD MAJOR DEPRESSION (H): ICD-10-CM

## 2022-06-16 DIAGNOSIS — F41.1 GENERALIZED ANXIETY DISORDER: ICD-10-CM

## 2022-06-21 RX ORDER — FLUOXETINE 10 MG/1
CAPSULE ORAL
Qty: 60 CAPSULE | Refills: 1 | Status: SHIPPED | OUTPATIENT
Start: 2022-06-21 | End: 2022-08-22

## 2022-06-21 NOTE — TELEPHONE ENCOUNTER
"Requested Prescriptions   Pending Prescriptions Disp Refills     FLUoxetine (PROZAC) 10 MG capsule [Pharmacy Med Name: FLUOXETINE 10MG CAPSULES] 60 capsule 1     Sig: TAKE ONE CAPSULE BY MOUTH DAILY FOR 1-2 WEEKS THEN INCREASE TO TWO CAPSULES       SSRIs Protocol Failed - 6/16/2022  3:31 AM        Failed - PHQ-9 score less than 5 in past 6 months     Please review last PHQ-9 score.           Passed - Medication is active on med list        Passed - Patient is age 18 or older        Passed - No active pregnancy on record        Passed - No positive pregnancy test in last 12 months        Passed - Recent (6 mo) or future (30 days) visit within the authorizing provider's specialty     Patient had office visit in the last 6 months or has a visit in the next 30 days with authorizing provider or within the authorizing provider's specialty.  See \"Patient Info\" tab in inbasket, or \"Choose Columns\" in Meds & Orders section of the refill encounter.                 "

## 2022-06-22 ENCOUNTER — MYC MEDICAL ADVICE (OUTPATIENT)
Dept: ENDOCRINOLOGY | Facility: CLINIC | Age: 37
End: 2022-06-22

## 2022-06-22 ENCOUNTER — LAB (OUTPATIENT)
Dept: LAB | Facility: CLINIC | Age: 37
End: 2022-06-22
Payer: MEDICAID

## 2022-06-22 DIAGNOSIS — E05.00 GRAVES DISEASE: Primary | ICD-10-CM

## 2022-06-22 DIAGNOSIS — E05.00 GRAVES DISEASE: ICD-10-CM

## 2022-06-22 LAB
BASOPHILS # BLD AUTO: 0 10E3/UL (ref 0–0.2)
BASOPHILS NFR BLD AUTO: 0 %
EOSINOPHIL # BLD AUTO: 0.2 10E3/UL (ref 0–0.7)
EOSINOPHIL NFR BLD AUTO: 2 %
ERYTHROCYTE [DISTWIDTH] IN BLOOD BY AUTOMATED COUNT: 13.6 % (ref 10–15)
HCT VFR BLD AUTO: 40.1 % (ref 35–47)
HGB BLD-MCNC: 13.2 G/DL (ref 11.7–15.7)
IMM GRANULOCYTES # BLD: 0 10E3/UL
IMM GRANULOCYTES NFR BLD: 0 %
LYMPHOCYTES # BLD AUTO: 2.4 10E3/UL (ref 0.8–5.3)
LYMPHOCYTES NFR BLD AUTO: 23 %
MCH RBC QN AUTO: 27.6 PG (ref 26.5–33)
MCHC RBC AUTO-ENTMCNC: 32.9 G/DL (ref 31.5–36.5)
MCV RBC AUTO: 84 FL (ref 78–100)
MONOCYTES # BLD AUTO: 0.9 10E3/UL (ref 0–1.3)
MONOCYTES NFR BLD AUTO: 8 %
NEUTROPHILS # BLD AUTO: 6.9 10E3/UL (ref 1.6–8.3)
NEUTROPHILS NFR BLD AUTO: 66 %
PLATELET # BLD AUTO: 461 10E3/UL (ref 150–450)
RBC # BLD AUTO: 4.79 10E6/UL (ref 3.8–5.2)
WBC # BLD AUTO: 10.5 10E3/UL (ref 4–11)

## 2022-06-22 PROCEDURE — 85025 COMPLETE CBC W/AUTO DIFF WBC: CPT

## 2022-06-22 PROCEDURE — 36415 COLL VENOUS BLD VENIPUNCTURE: CPT

## 2022-06-22 NOTE — TELEPHONE ENCOUNTER
Spoke w/ Pt: confirms understanding to draw CBC only per Dr Roman.   Sagrario Fallon, RN on 6/22/2022 at 10:39 AM          FW: Medication side effect  Received: Today  Maricel Roman MD Miller, Sagrario URIBE RN  I have placed the CBC order.   Thyroid labs are not due yet.  Please advise patient to just get the CBC done.  Thyroid hormone labs are to be done next month.   Thank you

## 2022-06-22 NOTE — TELEPHONE ENCOUNTER
Spoke w/ Pt: Confirms understanding to hold methimazole until instructed by provider to restart. Confirms she will have  Labs drawn today. Orders in place.   Provider notified.   Sagrario Fallon RN on 6/22/2022 at 8:54 AM     RE    Medication side effect    Tram Bal, Maricel Szymanski MD 2 hours ago (6:47 AM)       I have developed a sore throat and was told if I did to stop medication Methimazole. I am not sure if it s due to the medication or if I have allergies as I am congested as well. Do I stop medication??

## 2022-06-23 ENCOUNTER — MYC MEDICAL ADVICE (OUTPATIENT)
Dept: ENDOCRINOLOGY | Facility: CLINIC | Age: 37
End: 2022-06-23

## 2022-07-11 ENCOUNTER — LAB (OUTPATIENT)
Dept: LAB | Facility: CLINIC | Age: 37
End: 2022-07-11
Payer: COMMERCIAL

## 2022-07-11 DIAGNOSIS — E05.00 GRAVES DISEASE: ICD-10-CM

## 2022-07-11 LAB
ALBUMIN SERPL-MCNC: 3.2 G/DL (ref 3.4–5)
ALP SERPL-CCNC: 80 U/L (ref 40–150)
ALT SERPL W P-5'-P-CCNC: 30 U/L (ref 0–50)
AST SERPL W P-5'-P-CCNC: 14 U/L (ref 0–45)
BASOPHILS # BLD AUTO: 0.1 10E3/UL (ref 0–0.2)
BASOPHILS NFR BLD AUTO: 1 %
BILIRUB DIRECT SERPL-MCNC: 0.1 MG/DL (ref 0–0.2)
BILIRUB SERPL-MCNC: 0.5 MG/DL (ref 0.2–1.3)
EOSINOPHIL # BLD AUTO: 0.1 10E3/UL (ref 0–0.7)
EOSINOPHIL NFR BLD AUTO: 2 %
ERYTHROCYTE [DISTWIDTH] IN BLOOD BY AUTOMATED COUNT: 14.4 % (ref 10–15)
HCT VFR BLD AUTO: 39.4 % (ref 35–47)
HGB BLD-MCNC: 13 G/DL (ref 11.7–15.7)
IMM GRANULOCYTES # BLD: 0 10E3/UL
IMM GRANULOCYTES NFR BLD: 0 %
LYMPHOCYTES # BLD AUTO: 2 10E3/UL (ref 0.8–5.3)
LYMPHOCYTES NFR BLD AUTO: 26 %
MCH RBC QN AUTO: 27.8 PG (ref 26.5–33)
MCHC RBC AUTO-ENTMCNC: 33 G/DL (ref 31.5–36.5)
MCV RBC AUTO: 84 FL (ref 78–100)
MONOCYTES # BLD AUTO: 0.6 10E3/UL (ref 0–1.3)
MONOCYTES NFR BLD AUTO: 8 %
NEUTROPHILS # BLD AUTO: 4.9 10E3/UL (ref 1.6–8.3)
NEUTROPHILS NFR BLD AUTO: 63 %
NRBC # BLD AUTO: 0 10E3/UL
NRBC BLD AUTO-RTO: 0 /100
PLATELET # BLD AUTO: 441 10E3/UL (ref 150–450)
PROT SERPL-MCNC: 7.2 G/DL (ref 6.8–8.8)
RBC # BLD AUTO: 4.67 10E6/UL (ref 3.8–5.2)
T3 SERPL-MCNC: 145 NG/DL (ref 85–202)
T4 FREE SERPL-MCNC: 0.92 NG/DL (ref 0.76–1.46)
TSH SERPL DL<=0.005 MIU/L-ACNC: 0.01 MU/L (ref 0.4–4)
WBC # BLD AUTO: 7.8 10E3/UL (ref 4–11)

## 2022-07-11 PROCEDURE — 36415 COLL VENOUS BLD VENIPUNCTURE: CPT

## 2022-07-11 PROCEDURE — 85025 COMPLETE CBC W/AUTO DIFF WBC: CPT

## 2022-07-11 PROCEDURE — 84480 ASSAY TRIIODOTHYRONINE (T3): CPT

## 2022-07-11 PROCEDURE — 84439 ASSAY OF FREE THYROXINE: CPT

## 2022-07-11 PROCEDURE — 84443 ASSAY THYROID STIM HORMONE: CPT

## 2022-07-11 PROCEDURE — 80076 HEPATIC FUNCTION PANEL: CPT

## 2022-07-21 ENCOUNTER — OFFICE VISIT (OUTPATIENT)
Dept: ENDOCRINOLOGY | Facility: CLINIC | Age: 37
End: 2022-07-21
Payer: COMMERCIAL

## 2022-07-21 VITALS
WEIGHT: 179.4 LBS | SYSTOLIC BLOOD PRESSURE: 127 MMHG | HEIGHT: 63 IN | OXYGEN SATURATION: 100 % | HEART RATE: 79 BPM | DIASTOLIC BLOOD PRESSURE: 87 MMHG | BODY MASS INDEX: 31.79 KG/M2

## 2022-07-21 DIAGNOSIS — E05.00 GRAVES DISEASE: Primary | ICD-10-CM

## 2022-07-21 PROCEDURE — 99213 OFFICE O/P EST LOW 20 MIN: CPT | Performed by: INTERNAL MEDICINE

## 2022-07-21 RX ORDER — METHIMAZOLE 5 MG/1
5 TABLET ORAL DAILY
Qty: 90 TABLET | Refills: 3 | Status: SHIPPED | OUTPATIENT
Start: 2022-07-21 | End: 2023-07-25

## 2022-07-21 ASSESSMENT — PAIN SCALES - GENERAL: PAINLEVEL: NO PAIN (0)

## 2022-07-21 NOTE — PROGRESS NOTES
Tram Bal is a 37 year old yo female who presents today for evaluation of Grave's Disease. She was previously seen by Dr Gilbert/Jessie on 6/7 as urgent THADDEUS visit. She is here for interval follow-up. She aslo has PMHx of generalized anxiety disorder and GERD    Chief Complaint   Patient presents with     Thyroid Problem       1) Grave's Disease  Diagnosis April 2022 experiencing palpations without any other hyperthyroid symptoms, found to have TSH < 0.01, free T4: 1.25, TT3: 192 with TSI: 1.4. She was initially started on propranolol 20mg TID which helped improved symptoms. She was started on methimazole 5mg at visit in June, which she has been taking now for 6 weeks.  Treatment Methimazole 5mg daily    INTERVAL HISTORY:  Does note 1-2 months prior to symptom development had contrasted CT with evaluation of stomach (gastric bypass)  Palpations have improved, notes some insomnia  Denies signs/symptoms of hypo/hyperthyroidism including hot/cold intolerance, diarrhea/constipation, major changes in hair skin or nails, tremor, palpations, or fatigue.  Denies any difficulty breathing, difficulty swallowing, shortness of breath, enlargement of neck/thyroid, family history of thyroid cancer, exposure to radiation, hoarseness or weight loss.            Active diagnoses this visit:  Graves disease     ROS: 10 point ROS neg other than the symptoms noted above in the HPI.      Medical, surgical, social, and family histories, medications and allergies reviewed and updated.  Past Medical History:   Diagnosis Date     Abnormal Pap smear of cervix 2004    2005, 2006, 2010, 2016, 2017     Cervical high risk HPV (human papillomavirus) test positive 2017 2018, 2020     Depressive disorder 2016     Displacement of lumbar intervertebral disc 05/15/2008     Esophageal reflux      Hx of colposcopy with cervical biopsy 05/22/2017 5/22/17:[facility]- Benign.        Past Surgical History:   Procedure Laterality Date      "COLPOSCOPY,LOOP ELECTRD CERVIX EXCIS  2005    JARETH III     GI SURGERY  11    LAP BANDING for obesity, pre-DM     ZZC NONSPECIFIC PROCEDURE      Oral surgery       Allergies:  Famvir [famciclovir]    Social History     Tobacco Use     Smoking status: Former Smoker     Packs/day: 0.00     Years: 3.00     Pack years: 0.00     Quit date: 2010     Years since quittin.2     Smokeless tobacco: Never Used     Tobacco comment: quit smoking may 2010    Substance Use Topics     Alcohol use: Yes     Comment: occasionally       Family History   Problem Relation Age of Onset     Thyroid Disease Mother      Unknown/Adopted Mother      Hyperlipidemia Father      Hypertension Paternal Grandmother      Hyperlipidemia Paternal Grandmother      Diabetes Paternal Aunt      Hypertension Paternal Aunt      Lipids Paternal Aunt      Thyroid Disease Maternal Half-Brother      Unknown/Adopted No family hx of         unaware of maternal grandparents history as pt mom was adopted     Breast Cancer No family hx of      Cancer - colorectal No family hx of      Gynecology No family hx of         no ovarian cancer     Coronary Artery Disease No family hx of      Cerebrovascular Disease No family hx of      Thyroid Disease No family hx of      Osteoporosis No family hx of          Objective:  /87 (BP Location: Left arm, Patient Position: Sitting, Cuff Size: Adult Regular)   Pulse 79   Ht 1.6 m (5' 3\")   Wt 81.4 kg (179 lb 6.4 oz)   SpO2 100%   BMI 31.78 kg/m      Exam:  Constitutional: healthy, alert, no acute distress  Head: Normocephalic. No masses, lesions, no exophthalmos/proptosis  ENT: mildly enlarged homogenous thyroid, no palpable nodules, no cervical lymph nodes  Respiratory: nonlabored  Gastrointestinal: Abdomen soft, non-tender.  Musculoskeletal: extremities normal- no gross deformities noted, gait normal and normal muscle tone  Skin: no suspicious lesions or rashes  Neurologic: Gait normal. sensation " grossly intact  Psychiatric: mentation appears normal, calm      Lab Results   Component Value Date/Time    TSH 0.01 (L) 07/11/2022 01:52 PM    TSH 1.20 10/26/2018 02:44 PM    T4 0.92 07/11/2022 01:52 PM    PTHI 21 01/14/2020 01:32 PM     Last Comprehensive Metabolic Panel:  Sodium   Date Value Ref Range Status   04/20/2022 139 133 - 144 mmol/L Final   10/26/2018 141 133 - 144 mmol/L Final     Potassium   Date Value Ref Range Status   04/20/2022 4.1 3.4 - 5.3 mmol/L Final   10/26/2018 4.1 3.4 - 5.3 mmol/L Final     Chloride   Date Value Ref Range Status   04/20/2022 108 94 - 109 mmol/L Final   10/26/2018 107 94 - 109 mmol/L Final     Carbon Dioxide   Date Value Ref Range Status   10/26/2018 24 20 - 32 mmol/L Final     Carbon Dioxide (CO2)   Date Value Ref Range Status   04/20/2022 26 20 - 32 mmol/L Final     Anion Gap   Date Value Ref Range Status   04/20/2022 5 3 - 14 mmol/L Final   10/26/2018 10 3 - 14 mmol/L Final     Glucose   Date Value Ref Range Status   04/20/2022 107 (H) 70 - 99 mg/dL Final   10/26/2018 86 70 - 99 mg/dL Final     Urea Nitrogen   Date Value Ref Range Status   04/20/2022 10 7 - 30 mg/dL Final   10/26/2018 10 7 - 30 mg/dL Final     Creatinine   Date Value Ref Range Status   04/20/2022 0.55 0.52 - 1.04 mg/dL Final   10/26/2018 0.68 0.52 - 1.04 mg/dL Final     GFR Estimate   Date Value Ref Range Status   04/20/2022 >90 >60 mL/min/1.73m2 Final     Comment:     Effective December 21, 2021 eGFRcr in adults is calculated using the 2021 CKD-EPI creatinine equation which includes age and gender (Torito will al., NEJM, DOI: 10.1056/UBWRei2387651)   10/26/2018 >90 >60 mL/min/1.7m2 Final     Comment:     Non  GFR Calc     Calcium   Date Value Ref Range Status   04/20/2022 9.0 8.5 - 10.1 mg/dL Final   10/26/2018 8.4 (L) 8.5 - 10.1 mg/dL Final           ASSESSMENT / PLAN:  No diagnosis found.      1) Grave's Disease  - TFTs overall improving, TSH likely lagging behind but is now detectable  -  Will continue current dose methimazole, recheck 4-6 weeks following this  - Start to taper propranolol-- currently on 60mg XR daily, will take every other day for a week and stop  - Discussed overall pronosis of grave's disease-- plan to recheck at 6 months and 12 months and decide if can trial off medication or if would need definitive therapy.           Orders Placed This Encounter   Procedures     T3 total     T4 free     TSH     CBC with platelets     Comprehensive metabolic panel         RTC 6 months    A total of 22 minutes were spent today 07/21/22 on this visit including documentation, chart review with greater than 50% of time spent on direct counseling.

## 2022-07-21 NOTE — PATIENT INSTRUCTIONS
Propranolol every other day for one week  Then stop    Continue Methimazole 5mg daily    Labs Mid August  Labs February (6 months)-- schedule follow-up visit (virtual)  Labs July (one year)

## 2022-07-21 NOTE — NURSING NOTE
"Chief Complaint   Patient presents with     Thyroid Problem     Vital signs:      BP: 127/87 Pulse: 79     SpO2: 100 %     Height: 160 cm (5' 3\") Weight: 81.4 kg (179 lb 6.4 oz)  Estimated body mass index is 31.78 kg/m  as calculated from the following:    Height as of this encounter: 1.6 m (5' 3\").    Weight as of this encounter: 81.4 kg (179 lb 6.4 oz).          "

## 2022-07-21 NOTE — LETTER
7/21/2022       RE: Tram Bal  [address] [facility] [address]     Dear Colleague,    Thank you for referring your patient, Tram Bal, to the [facility] [place] at [facility]. Please see a copy of my visit note below.    Tram Bal is a 37 year old yo female who presents today for evaluation of Grave's Disease. She was previously seen by Dr Gilbert/Jessie on 6/7 as urgent THADDEUS visit. She is here for interval follow-up. She aslo has PMHx of generalized anxiety disorder and GERD    Chief Complaint   Patient presents with     Thyroid Problem       1) Grave's Disease  Diagnosis April 2022 experiencing palpations without any other hyperthyroid symptoms, found to have TSH < 0.01, free T4: 1.25, TT3: 192 with TSI: 1.4. She was initially started on propranolol 20mg TID which helped improved symptoms. She was started on methimazole 5mg at visit in June, which she has been taking now for 6 weeks.  Treatment Methimazole 5mg daily    INTERVAL HISTORY:  Does note 1-2 months prior to symptom development had contrasted CT with evaluation of stomach (gastric bypass)  Palpations have improved, notes some insomnia  Denies signs/symptoms of hypo/hyperthyroidism including hot/cold intolerance, diarrhea/constipation, major changes in hair skin or nails, tremor, palpations, or fatigue.  Denies any difficulty breathing, difficulty swallowing, shortness of breath, enlargement of neck/thyroid, family history of thyroid cancer, exposure to radiation, hoarseness or weight loss.            Active diagnoses this visit:  Graves disease     ROS: 10 point ROS neg other than the symptoms noted above in the HPI.      Medical, surgical, social, and family histories, medications and allergies reviewed and updated.  Past Medical History:   Diagnosis Date     Abnormal Pap smear of cervix 2004    2005, 2006, 2010, 2016, 2017     Cervical high risk HPV  "(human papillomavirus) test positive 2017     Depressive disorder 2016     Displacement of lumbar intervertebral disc 05/15/2008     Esophageal reflux      Hx of colposcopy with cervical biopsy 2017:[facility]- Benign.        Past Surgical History:   Procedure Laterality Date     COLPOSCOPY,LOOP ELECTRD CERVIX EXCIS  2005    JARETH III     GI SURGERY  10    LAP BANDING for obesity, pre-DM     ZZC NONSPECIFIC PROCEDURE      Oral surgery       Allergies:  Famvir [famciclovir]    Social History     Tobacco Use     Smoking status: Former Smoker     Packs/day: 0.00     Years: 3.00     Pack years: 0.00     Quit date: 2010     Years since quittin.2     Smokeless tobacco: Never Used     Tobacco comment: quit smoking may 2010    Substance Use Topics     Alcohol use: Yes     Comment: occasionally       Family History   Problem Relation Age of Onset     Thyroid Disease Mother      Unknown/Adopted Mother      Hyperlipidemia Father      Hypertension Paternal Grandmother      Hyperlipidemia Paternal Grandmother      Diabetes Paternal Aunt      Hypertension Paternal Aunt      Lipids Paternal Aunt      Thyroid Disease Maternal Half-Brother      Unknown/Adopted No family hx of         unaware of maternal grandparents history as pt mom was adopted     Breast Cancer No family hx of      Cancer - colorectal No family hx of      Gynecology No family hx of         no ovarian cancer     Coronary Artery Disease No family hx of      Cerebrovascular Disease No family hx of      Thyroid Disease No family hx of      Osteoporosis No family hx of          Objective:  /87 (BP Location: Left arm, Patient Position: Sitting, Cuff Size: Adult Regular)   Pulse 79   Ht 1.6 m (5' 3\")   Wt 81.4 kg (179 lb 6.4 oz)   SpO2 100%   BMI 31.78 kg/m      Exam:  Constitutional: healthy, alert, no acute distress  Head: Normocephalic. No masses, lesions, no exophthalmos/proptosis  ENT: mildly enlarged " homogenous thyroid, no palpable nodules, no cervical lymph nodes  Respiratory: nonlabored  Gastrointestinal: Abdomen soft, non-tender.  Musculoskeletal: extremities normal- no gross deformities noted, gait normal and normal muscle tone  Skin: no suspicious lesions or rashes  Neurologic: Gait normal. sensation grossly intact  Psychiatric: mentation appears normal, calm      Lab Results   Component Value Date/Time    TSH 0.01 (L) 07/11/2022 01:52 PM    TSH 1.20 10/26/2018 02:44 PM    T4 0.92 07/11/2022 01:52 PM    PTHI 21 01/14/2020 01:32 PM     Last Comprehensive Metabolic Panel:  Sodium   Date Value Ref Range Status   04/20/2022 139 133 - 144 mmol/L Final   10/26/2018 141 133 - 144 mmol/L Final     Potassium   Date Value Ref Range Status   04/20/2022 4.1 3.4 - 5.3 mmol/L Final   10/26/2018 4.1 3.4 - 5.3 mmol/L Final     Chloride   Date Value Ref Range Status   04/20/2022 108 94 - 109 mmol/L Final   10/26/2018 107 94 - 109 mmol/L Final     Carbon Dioxide   Date Value Ref Range Status   10/26/2018 24 20 - 32 mmol/L Final     Carbon Dioxide (CO2)   Date Value Ref Range Status   04/20/2022 26 20 - 32 mmol/L Final     Anion Gap   Date Value Ref Range Status   04/20/2022 5 3 - 14 mmol/L Final   10/26/2018 10 3 - 14 mmol/L Final     Glucose   Date Value Ref Range Status   04/20/2022 107 (H) 70 - 99 mg/dL Final   10/26/2018 86 70 - 99 mg/dL Final     Urea Nitrogen   Date Value Ref Range Status   04/20/2022 10 7 - 30 mg/dL Final   10/26/2018 10 7 - 30 mg/dL Final     Creatinine   Date Value Ref Range Status   04/20/2022 0.55 0.52 - 1.04 mg/dL Final   10/26/2018 0.68 0.52 - 1.04 mg/dL Final     GFR Estimate   Date Value Ref Range Status   04/20/2022 >90 >60 mL/min/1.73m2 Final     Comment:     Effective December 21, 2021 eGFRcr in adults is calculated using the 2021 CKD-EPI creatinine equation which includes age and gender (Torito et al., NEJM, DOI: 10.1056/RYACjr2098696)   10/26/2018 >90 >60 mL/min/1.7m2 Final     Comment:      Non  GFR Calc     Calcium   Date Value Ref Range Status   04/20/2022 9.0 8.5 - 10.1 mg/dL Final   10/26/2018 8.4 (L) 8.5 - 10.1 mg/dL Final           ASSESSMENT / PLAN:  No diagnosis found.      1) Grave's Disease  - TFTs overall improving, TSH likely lagging behind but is now detectable  - Will continue current dose methimazole, recheck 4-6 weeks following this  - Start to taper propranolol-- currently on 60mg XR daily, will take every other day for a week and stop  - Discussed overall pronosis of grave's disease-- plan to recheck at 6 months and 12 months and decide if can trial off medication or if would need definitive therapy.           Orders Placed This Encounter   Procedures     T3 total     T4 free     TSH     CBC with platelets     Comprehensive metabolic panel         RTC 6 months    A total of 22 minutes were spent today 07/21/22 on this visit including documentation, chart review with greater than 50% of time spent on direct counseling.    Sincerely,    Paty Shanks MD

## 2022-07-29 ENCOUNTER — TELEPHONE (OUTPATIENT)
Dept: SURGERY | Facility: CLINIC | Age: 37
End: 2022-07-29

## 2022-07-29 NOTE — TELEPHONE ENCOUNTER
Sent pt  msg.     Last visit with RF   Will send over 3 month supply of omeprazole 40 mg capsules. Ask that patient try a few days without to see if she still needs them.  Happy with having no fluid in her band.       Will follow up for annual or prn      Debby Kumar RN

## 2022-07-29 NOTE — TELEPHONE ENCOUNTER
Medication & Dose: Omeprazole 40 mg capsules    Last Written Prescription Date:  04/26/22  Last Fill Quantity: 90  # refills: 0   Last office visit with prescribing provider:  04/26/2022   Future Office Visit with [facility] Provider:  none    Pharmacy (specify location): [facility] [phone]

## 2022-08-04 ENCOUNTER — E-VISIT (OUTPATIENT)
Dept: URGENT CARE | Facility: CLINIC | Age: 37
End: 2022-08-04
Payer: COMMERCIAL

## 2022-08-04 DIAGNOSIS — N39.0 ACUTE UTI (URINARY TRACT INFECTION): Primary | ICD-10-CM

## 2022-08-04 PROCEDURE — 99207 PR NO CHARGE LOS: CPT | Performed by: NURSE PRACTITIONER

## 2022-08-04 RX ORDER — NITROFURANTOIN 25; 75 MG/1; MG/1
100 CAPSULE ORAL 2 TIMES DAILY
Qty: 10 CAPSULE | Refills: 0 | Status: SHIPPED | OUTPATIENT
Start: 2022-08-04 | End: 2022-08-09

## 2022-08-04 NOTE — PATIENT INSTRUCTIONS
Dear Tram Bal    After reviewing your responses, I've been able to diagnose you with a urinary tract infection, which is a common infection of the bladder with bacteria.  This is not a sexually transmitted infection, though urinating immediately after intercourse can help prevent infections.  Drinking lots of fluids is also helpful to clear your current infection and prevent the next one.      I have sent a prescription for antibiotics to your pharmacy to treat this infection.    It is important that you take all of your prescribed medication even if your symptoms are improving after a few doses.  Taking all of your medicine helps prevent the symptoms from returning.     If your symptoms worsen, you develop pain in your back or stomach, develop fevers, or are not improving in 5 days, please contact your primary care provider for an appointment or visit any of our convenient Walk-in or Urgent Care Centers to be seen, which can be found on our website here.    Thanks again for choosing us as your health care partner,    DEON Thomas CNP    Urinary Tract Infections in Women  Urinary tract infections (UTIs) are most often caused by bacteria. These bacteria enter the urinary tract. The bacteria may come from inside the body. Or they may travel from the skin outside the rectum or vagina into the urethra. Female anatomy makes it easy for bacteria from the bowel to enter a woman s urinary tract, which is the most common source of UTI. This means women develop UTIs more often than men. Pain in or around the urinary tract is a common UTI symptom. But the only way to know for sure if you have a UTI for the healthcare provider to test your urine. The two tests that may be done are the urinalysis and urine culture.     Types of UTIs    Cystitis. A bladder infection (cystitis) is the most common UTI in women. You may have urgent or frequent need to pee. You may also have pain, burning when you pee, and bloody  urine.    Urethritis. This is an inflamed urethra, which is the tube that carries urine from the bladder to outside the body. You may have lower stomach or back pain. You may also have urgent or frequent need to pee.    Pyelonephritis. This is a kidney infection. If not treated, it can be serious and damage your kidneys. In severe cases, you may need to stay in the hospital. You may have a fever and lower back pain.    Medicines to treat a UTI  Most UTIs are treated with antibiotics. These kill the bacteria. The length of time you need to take them depends on the type of infection. It may be as short as 3 days. If you have repeated UTIs, you may need a low-dose antibiotic for several months. Take antibiotics exactly as directed. Don t stop taking them until all of the medicine is gone. If you stop taking the antibiotic too soon, the infection may not go away. You may also develop a resistance to the antibiotic. This can make it much harder to treat.   Lifestyle changes to treat and prevent UTIs   The lifestyle changes below will help get rid of your UTI. They may also help prevent future UTIs.     Drink plenty of fluids. This includes water, juice, or other caffeine-free drinks. Fluids help flush bacteria out of your body.    Empty your bladder. Always empty your bladder when you feel the urge to pee. And always pee before going to sleep. Urine that stays in your bladder can lead to infection. Try to pee before and after sex as well.    Practice good personal hygiene. Wipe yourself from front to back after using the toilet. This helps keep bacteria from getting into the urethra.    Use condoms during sex. These help prevent UTIs caused by sexually transmitted bacteria. Also don't use spermicides during sex. These can increase the risk for UTIs. Choose other forms of birth control instead. For women who tend to get UTIs after sex, a low-dose of a preventive antibiotic may be used. Be sure to discuss this option with  your healthcare provider.    Follow up with your healthcare provider as directed. He or she may test to make sure the infection has cleared. If needed, more treatment may be started.  Mariella last reviewed this educational content on 7/1/2019 2000-2021 [employer]. All rights reserved. This information is not intended as a substitute for professional medical care. Always follow your healthcare professional's instructions.

## 2022-08-08 NOTE — TELEPHONE ENCOUNTER
FYI to provider - Patient is lost to pap tracking follow-up. Attempts to contact pt have been made per reminder process and there has been no reply and/or no appt scheduled. Contact hx listed below.     7/04 LSIL   10/04 [facility] JARETH I  4/05 LSIL  6/05 [facility] JARETH II & III  8/05 LEEP JARETH III  12/05 NIL pap  4/06 ASCUS, neg HPV  10/06, 11/07, 12/08 NIL paps  1/10 ASCUS, neg HPV  2011, 2012, 2013 NIL paps  3/31/14 NIL/neg HPV. Plan cotest in 1 year.  4/20/15 NIL/neg HPV. Plan: cotest in 3 years.  4/25/16 ASCUS, Neg HPV. Cotest in 1 year.   4/28/17 ASCUS, +High Risk HPV (Neg 16/18).  Plan [facility]  5/22/17 [facility] Benign. ASCUS pap, +HR HPV (Not types 16/18). Plan cotest in 1 year.   10/26/18 NIL Pap, + HR HPV (Neg 16/18). Plan colp  4/1/19 [facility] ECC - atypia, favor reactive change.  NIL pap, + HR HPV (not 16 or 18). Plan: cotest in 1 yr   11/23/20 NIL Pap, + HR HPV (neg 16/18). [facility] due by 2/23/2021 6/29/21 [facility] - no JARETH. Plan: cotest in 1 year  6/14/22 Reminder mychart  7/8/22 Reminder call -- left message  8/8/22 Lost to follow-up for pap tracking     Brisa Amezquita RN BSN, Pap Tracking

## 2022-08-22 ENCOUNTER — MYC MEDICAL ADVICE (OUTPATIENT)
Dept: FAMILY MEDICINE | Facility: CLINIC | Age: 37
End: 2022-08-22

## 2022-08-22 DIAGNOSIS — F41.1 GENERALIZED ANXIETY DISORDER: ICD-10-CM

## 2022-08-22 DIAGNOSIS — F32.0 MILD MAJOR DEPRESSION (H): ICD-10-CM

## 2022-08-22 ASSESSMENT — PATIENT HEALTH QUESTIONNAIRE - PHQ9
SUM OF ALL RESPONSES TO PHQ QUESTIONS 1-9: 9
10. IF YOU CHECKED OFF ANY PROBLEMS, HOW DIFFICULT HAVE THESE PROBLEMS MADE IT FOR YOU TO DO YOUR WORK, TAKE CARE OF THINGS AT HOME, OR GET ALONG WITH OTHER PEOPLE: SOMEWHAT DIFFICULT
SUM OF ALL RESPONSES TO PHQ QUESTIONS 1-9: 9

## 2022-08-27 ENCOUNTER — LAB (OUTPATIENT)
Dept: LAB | Facility: CLINIC | Age: 37
End: 2022-08-27
Payer: COMMERCIAL

## 2022-08-27 DIAGNOSIS — E05.00 GRAVES DISEASE: ICD-10-CM

## 2022-08-27 LAB
ALBUMIN SERPL-MCNC: 3 G/DL (ref 3.4–5)
ALP SERPL-CCNC: 81 U/L (ref 40–150)
ALT SERPL W P-5'-P-CCNC: 24 U/L (ref 0–50)
ANION GAP SERPL CALCULATED.3IONS-SCNC: 7 MMOL/L (ref 3–14)
AST SERPL W P-5'-P-CCNC: 15 U/L (ref 0–45)
BILIRUB SERPL-MCNC: 0.4 MG/DL (ref 0.2–1.3)
BUN SERPL-MCNC: 12 MG/DL (ref 7–30)
CALCIUM SERPL-MCNC: 8.2 MG/DL (ref 8.5–10.1)
CHLORIDE BLD-SCNC: 109 MMOL/L (ref 94–109)
CO2 SERPL-SCNC: 24 MMOL/L (ref 20–32)
CREAT SERPL-MCNC: 0.65 MG/DL (ref 0.52–1.04)
ERYTHROCYTE [DISTWIDTH] IN BLOOD BY AUTOMATED COUNT: 14.5 % (ref 10–15)
GFR SERPL CREATININE-BSD FRML MDRD: >90 ML/MIN/1.73M2
GLUCOSE BLD-MCNC: 111 MG/DL (ref 70–99)
HCT VFR BLD AUTO: 37 % (ref 35–47)
HGB BLD-MCNC: 12.4 G/DL (ref 11.7–15.7)
MCH RBC QN AUTO: 28.6 PG (ref 26.5–33)
MCHC RBC AUTO-ENTMCNC: 33.5 G/DL (ref 31.5–36.5)
MCV RBC AUTO: 85 FL (ref 78–100)
PLATELET # BLD AUTO: 413 10E3/UL (ref 150–450)
POTASSIUM BLD-SCNC: 4 MMOL/L (ref 3.4–5.3)
PROT SERPL-MCNC: 6.8 G/DL (ref 6.8–8.8)
RBC # BLD AUTO: 4.34 10E6/UL (ref 3.8–5.2)
SODIUM SERPL-SCNC: 140 MMOL/L (ref 133–144)
T3 SERPL-MCNC: 135 NG/DL (ref 85–202)
T4 FREE SERPL-MCNC: 0.83 NG/DL (ref 0.76–1.46)
TSH SERPL DL<=0.005 MIU/L-ACNC: 0.58 MU/L (ref 0.4–4)
WBC # BLD AUTO: 7.1 10E3/UL (ref 4–11)

## 2022-08-27 PROCEDURE — 80053 COMPREHEN METABOLIC PANEL: CPT

## 2022-08-27 PROCEDURE — 84480 ASSAY TRIIODOTHYRONINE (T3): CPT

## 2022-08-27 PROCEDURE — 84439 ASSAY OF FREE THYROXINE: CPT

## 2022-08-27 PROCEDURE — 36415 COLL VENOUS BLD VENIPUNCTURE: CPT

## 2022-08-27 PROCEDURE — 85027 COMPLETE CBC AUTOMATED: CPT

## 2022-08-27 PROCEDURE — 84443 ASSAY THYROID STIM HORMONE: CPT

## 2022-08-29 NOTE — RESULT ENCOUNTER NOTE
Dear Tram,     Here are your recent results which are  now within the expected range. TFTs are normal! Please continue with your current plan of care and let us know if you have any questions or concerns.    Regards,  Paty Shanks MD

## 2022-09-22 ENCOUNTER — E-VISIT (OUTPATIENT)
Dept: PEDIATRICS | Facility: CLINIC | Age: 37
End: 2022-09-22
Payer: COMMERCIAL

## 2022-09-22 DIAGNOSIS — J06.9 VIRAL URI: Primary | ICD-10-CM

## 2022-09-22 DIAGNOSIS — R09.81 SINUS CONGESTION: ICD-10-CM

## 2022-09-22 PROCEDURE — 99207 PR NO CHARGE LOS: CPT | Performed by: PHYSICIAN ASSISTANT

## 2022-09-22 RX ORDER — FLUTICASONE PROPIONATE 50 MCG
2 SPRAY, SUSPENSION (ML) NASAL DAILY
Qty: 18.2 ML | Refills: 0 | Status: SHIPPED | OUTPATIENT
Start: 2022-09-22 | End: 2022-10-10

## 2022-09-22 NOTE — PATIENT INSTRUCTIONS
The symptoms you describe suggest a viral cause, which is much more common than a bacterial cause. Antibiotics will treat bacterial infections, but have no effect on viral infections. If possible, especially if improving, start with symptom care for the first 7-10 days, then consider seeking further treatment or taking an antibiotic. Bacterial infections generally are more severe, including symptoms such as pus, fever over 101degrees F, or rapidly worsening.    Allergic Rhinitis  Allergic rhinitis is an allergic reaction that affects the nose, and often the eyes. It s often known as nasal allergies. Nasal allergies are often due to things in the environment that are breathed in. Depending what you are sensitive to, nasal allergies may occur only during certain seasons, or they may occur year round. Common indoor allergens include house dust mites, mold, cockroaches, and pet dander. Outdoor allergens include pollen from trees, grasses, and weeds.    Symptoms include a drippy, stuffy, and itchy nose. They also include sneezing and red and itchy eyes. You may feel tired more often. Severe allergies may also affect your breathing and trigger a condition called asthma.    Tests can be done to see what allergens are affecting you. You may be referred to an allergy specialist for testing and further evaluation.   Home care  Your healthcare provider may prescribe medicines to help relieve allergy symptoms. These may include oral medicines, nasal sprays, or eye drops.   Ask your provider for advice on how to stay away from substances that you are allergic to. Below are a few tips for each type of allergen.   Pet dander:    Do not have pets with fur and feathers.    If you have a pet, keep it out of your bedroom and off upholstered furniture.  Pollen:    When pollen counts are high, keep windows of your car and home closed. If possible, use an air conditioner instead.    Wear a filter mask when mowing or doing yard  work.  House dust mites:    Wash bedding every week in warm water and detergent and dry on a hot setting.    Cover the mattress, box spring, and pillows with allergy covers.     If possible, sleep in a room with no carpet, curtains, or upholstered furniture.  Cockroaches:    Store food in sealed containers.    Remove garbage from the home promptly.    Fix water leaks.  Mold:    Keep humidity low by using a dehumidifier or air conditioner. Keep the dehumidifier and air conditioner clean and free of mold.    Clean moldy areas with bleach and water. Don't mix bleach with other .  In general:    Vacuum once or twice a week. If possible, use a vacuum with a high-efficiency particulate air (HEPA) filter.    Don't smoke. Stay away from cigarette smoke. Cigarette smoke is an irritant that can make symptoms worse.  Follow-up care  Follow up as advised by the healthcare provider or our staff. If you were referred to an allergy specialist, make this appointment promptly.   When to seek medical advice  Call your healthcare provider or get medical care right away if the following occur:     Coughing    Fever of 100.4 F (38 C) or higher, or as directed by your healthcare provider    Raised red bumps (hives)    Continuing symptoms, new symptoms, or worsening symptoms  Call 911  Call 911 if you have:     Trouble breathing    Severe swelling of the face or severe itching of the eyes or mouth    Wheezing or shortness of breath    Chest tightness    Dizziness or lightheadedness    Feeling of doom    Stomach pain, bloating, vomiting, or diarrhea  [employer] last reviewed this educational content on 10/1/2019    4892-2802 [employer]. All rights reserved. This information is not intended as a substitute for professional medical care. Always follow your healthcare professional's instructions.        (J06.9) Viral URI  (primary encounter diagnosis)  Comment:   Plan: fluticasone (FLONASE) 50 MCG/ACT nasal spray             (R09.81) Sinus congestion  Comment:   Plan: fluticasone (FLONASE) 50 MCG/ACT nasal spray            Consider Covid testing as well.    Daily antihistamine such as claritin.     Follow up with primary clinic or urgent care should symptoms persist or worsen.

## 2022-09-26 DIAGNOSIS — F32.0 MILD MAJOR DEPRESSION (H): ICD-10-CM

## 2022-09-26 DIAGNOSIS — F41.1 GENERALIZED ANXIETY DISORDER: ICD-10-CM

## 2022-10-04 ENCOUNTER — MYC MEDICAL ADVICE (OUTPATIENT)
Dept: FAMILY MEDICINE | Facility: CLINIC | Age: 37
End: 2022-10-04

## 2022-10-04 DIAGNOSIS — E05.00 GRAVES DISEASE: Primary | ICD-10-CM

## 2022-10-04 DIAGNOSIS — B00.9 HSV (HERPES SIMPLEX VIRUS) INFECTION: ICD-10-CM

## 2022-10-04 RX ORDER — PROPRANOLOL HYDROCHLORIDE 10 MG/1
10 TABLET ORAL 2 TIMES DAILY
Qty: 60 TABLET | Refills: 1 | Status: SHIPPED | OUTPATIENT
Start: 2022-10-04 | End: 2022-11-07

## 2022-10-05 RX ORDER — ACYCLOVIR 800 MG/1
800 TABLET ORAL 3 TIMES DAILY
Qty: 21 TABLET | Refills: 2 | Status: SHIPPED | OUTPATIENT
Start: 2022-10-05 | End: 2024-08-19

## 2022-10-10 ENCOUNTER — OFFICE VISIT (OUTPATIENT)
Dept: FAMILY MEDICINE | Facility: CLINIC | Age: 37
End: 2022-10-10
Payer: COMMERCIAL

## 2022-10-10 VITALS
HEART RATE: 68 BPM | BODY MASS INDEX: 33.49 KG/M2 | RESPIRATION RATE: 16 BRPM | DIASTOLIC BLOOD PRESSURE: 82 MMHG | TEMPERATURE: 98.3 F | WEIGHT: 189 LBS | HEIGHT: 63 IN | SYSTOLIC BLOOD PRESSURE: 128 MMHG

## 2022-10-10 DIAGNOSIS — Z00.00 ROUTINE GENERAL MEDICAL EXAMINATION AT A HEALTH CARE FACILITY: Primary | ICD-10-CM

## 2022-10-10 DIAGNOSIS — F41.1 GENERALIZED ANXIETY DISORDER: ICD-10-CM

## 2022-10-10 DIAGNOSIS — Z01.419 ENCOUNTER FOR GYNECOLOGICAL EXAMINATION WITHOUT ABNORMAL FINDING: ICD-10-CM

## 2022-10-10 DIAGNOSIS — F32.0 MILD MAJOR DEPRESSION (H): ICD-10-CM

## 2022-10-10 PROCEDURE — 99214 OFFICE O/P EST MOD 30 MIN: CPT | Mod: 25 | Performed by: NURSE PRACTITIONER

## 2022-10-10 PROCEDURE — 87624 HPV HI-RISK TYP POOLED RSLT: CPT | Performed by: NURSE PRACTITIONER

## 2022-10-10 PROCEDURE — G0145 SCR C/V CYTO,THINLAYER,RESCR: HCPCS | Performed by: NURSE PRACTITIONER

## 2022-10-10 PROCEDURE — 99395 PREV VISIT EST AGE 18-39: CPT | Performed by: NURSE PRACTITIONER

## 2022-10-10 ASSESSMENT — ENCOUNTER SYMPTOMS
SHORTNESS OF BREATH: 0
ABDOMINAL PAIN: 0
PARESTHESIAS: 0
DIARRHEA: 0
CHILLS: 0
PALPITATIONS: 0
HEADACHES: 0
HEMATURIA: 0
CONSTIPATION: 0
SORE THROAT: 0
HEMATOCHEZIA: 0
WEAKNESS: 0
JOINT SWELLING: 0
DIZZINESS: 0
ARTHRALGIAS: 0
MYALGIAS: 0
BREAST MASS: 0
FREQUENCY: 0
EYE PAIN: 0
NERVOUS/ANXIOUS: 0
DYSURIA: 0
NAUSEA: 0
FEVER: 0
HEARTBURN: 1
COUGH: 0

## 2022-10-10 ASSESSMENT — ANXIETY QUESTIONNAIRES
6. BECOMING EASILY ANNOYED OR IRRITABLE: SEVERAL DAYS
2. NOT BEING ABLE TO STOP OR CONTROL WORRYING: NOT AT ALL
GAD7 TOTAL SCORE: 5
7. FEELING AFRAID AS IF SOMETHING AWFUL MIGHT HAPPEN: SEVERAL DAYS
1. FEELING NERVOUS, ANXIOUS, OR ON EDGE: SEVERAL DAYS
5. BEING SO RESTLESS THAT IT IS HARD TO SIT STILL: NOT AT ALL
GAD7 TOTAL SCORE: 5
3. WORRYING TOO MUCH ABOUT DIFFERENT THINGS: SEVERAL DAYS

## 2022-10-10 ASSESSMENT — PATIENT HEALTH QUESTIONNAIRE - PHQ9
SUM OF ALL RESPONSES TO PHQ QUESTIONS 1-9: 7
SUM OF ALL RESPONSES TO PHQ QUESTIONS 1-9: 7
10. IF YOU CHECKED OFF ANY PROBLEMS, HOW DIFFICULT HAVE THESE PROBLEMS MADE IT FOR YOU TO DO YOUR WORK, TAKE CARE OF THINGS AT HOME, OR GET ALONG WITH OTHER PEOPLE: NOT DIFFICULT AT ALL
5. POOR APPETITE OR OVEREATING: SEVERAL DAYS

## 2022-10-10 NOTE — PATIENT INSTRUCTIONS
[phone] Surgery clinic    Preventive Health Recommendations  Female Ages 26 - 39  Yearly exam:   See your health care provider every year in order to  Review health changes.   Discuss preventive care.    Review your medicines if you your doctor has prescribed any.    Until age 30: Get a Pap test every three years (more often if you have had an abnormal result).    After age 30: Talk to your doctor about whether you should have a Pap test every 3 years or have a Pap test with HPV screening every 5 years.   You do not need a Pap test if your uterus was removed (hysterectomy) and you have not had cancer.  You should be tested each year for STDs (sexually transmitted diseases), if you're at risk.   Talk to your provider about how often to have your cholesterol checked.  If you are at risk for diabetes, you should have a diabetes test (fasting glucose).  Shots: Get a flu shot each year. Get a tetanus shot every 10 years.   Nutrition:   Eat at least 5 servings of fruits and vegetables each day.  Eat whole-grain bread, whole-wheat pasta and brown rice instead of white grains and rice.  Get adequate Calcium and Vitamin D.     Lifestyle  Exercise at least 150 minutes a week (30 minutes a day, 5 days of the week). This will help you control your weight and prevent disease.  Limit alcohol to one drink per day.  No smoking.   Wear sunscreen to prevent skin cancer.  See your dentist every six months for an exam and cleaning.

## 2022-10-10 NOTE — PROGRESS NOTES
SUBJECTIVE:   CC: Tram is an 37 year old who presents for preventive health visit.     Healthy Habits:     Getting at least 3 servings of Calcium per day:  NO    Bi-annual eye exam:  NO    Dental care twice a year:  Yes    Sleep apnea or symptoms of sleep apnea:  None    Diet:  Regular (no restrictions)    Frequency of exercise:  1 day/week    Duration of exercise:  45-60 minutes    Taking medications regularly:  Yes    Medication side effects:  None    PHQ-2 Total Score: 2    Additional concerns today:  No    Depression and Anxiety Follow-Up    How are you doing with your depression since your last visit? No change    How are you doing with your anxiety since your last visit?  No change    Are you having other symptoms that might be associated with depression or anxiety? No    Have you had a significant life event? No     Do you have any concerns with your use of alcohol or other drugs? No    Social History     Tobacco Use     Smoking status: Former     Packs/day: 0.00     Years: 3.00     Pack years: 0.00     Types: Cigarettes     Quit date: 2010     Years since quittin.4     Smokeless tobacco: Never     Tobacco comments:     quit smoking may 2010    Vaping Use     Vaping Use: Never used   Substance Use Topics     Alcohol use: Yes     Comment: occasionally     Drug use: No     PHQ 2022 2022 10/10/2022   PHQ-9 Total Score 18 9 7   Q9: Thoughts of better off dead/self-harm past 2 weeks Not at all Not at all Not at all     CLARA-7 SCORE 2021 2022 10/10/2022   Total Score 4 (minimal anxiety) 17 (severe anxiety) -   Total Score 4 17 5       Today's PHQ-2 Score:   PHQ-2 (  [employer]) 10/10/2022   Q1: Little interest or pleasure in doing things 1   Q2: Feeling down, depressed or hopeless 1   PHQ-2 Score 2   PHQ-2 Total Score (12-17 Years)- Positive if 3 or more points; Administer PHQ-A if positive -   Q1: Little interest or pleasure in doing things Several days   Q2: Feeling down,  depressed or hopeless Several days   PHQ-2 Score 2       Abuse: Current or Past (Physical, Sexual or Emotional) - No  Do you feel safe in your environment? Yes    Have you ever done Advance Care Planning? (For example, a Health Directive, POLST, or a discussion with a medical provider or your loved ones about your wishes): No, advance care planning information given to patient to review.  Patient declined advance care planning discussion at this time.    Social History     Tobacco Use     Smoking status: Former     Packs/day: 0.00     Years: 3.00     Pack years: 0.00     Types: Cigarettes     Quit date: 2010     Years since quittin.4     Smokeless tobacco: Never     Tobacco comments:     quit smoking may 2010    Substance Use Topics     Alcohol use: Yes     Comment: occasionally       Alcohol Use 10/10/2022   Prescreen: >3 drinks/day or >7 drinks/week? No   Prescreen: >3 drinks/day or >7 drinks/week? -       Reviewed orders with patient.  Reviewed health maintenance and updated orders accordingly - Yes  Labs reviewed in EPIC    Breast Cancer Screening:    FHS-7: No flowsheet data found.    Patient under 40 years of age: Routine Mammogram Screening not recommended.   Pertinent mammograms are reviewed under the imaging tab.    History of abnormal Pap smear:  PAP / HPV Latest Ref Rng & Units 2020 2019 10/26/2018   PAP (Historical) - NIL NIL NIL   HPV16 NEG:Negative Negative Negative Negative   HPV18 NEG:Negative Negative Negative Negative   HRHPV NEG:Negative Positive(A) Positive(A) Positive(A)     Reviewed and updated as needed this visit by clinical staff   Tobacco  Allergies  Meds  Problems  Med Hx  Surg Hx  Fam Hx  Soc   Hx        Reviewed and updated as needed this visit by Provider   Tobacco  Allergies  Meds  Problems  Med Hx  Surg Hx  Fam Hx           Review of Systems   Constitutional: Negative for chills and fever.   HENT: Negative for congestion, ear pain, hearing loss and  "sore throat.    Eyes: Negative for pain and visual disturbance.   Respiratory: Negative for cough and shortness of breath.    Cardiovascular: Negative for chest pain, palpitations and peripheral edema.   Gastrointestinal: Positive for heartburn. Negative for abdominal pain, constipation, diarrhea, hematochezia and nausea.   Breasts:  Negative for tenderness, breast mass and discharge.   Genitourinary: Negative for dysuria, frequency, genital sores, hematuria, pelvic pain, urgency, vaginal bleeding and vaginal discharge.   Musculoskeletal: Negative for arthralgias, joint swelling and myalgias.   Skin: Negative for rash.   Neurological: Negative for dizziness, weakness, headaches and paresthesias.   Psychiatric/Behavioral: Negative for mood changes. The patient is not nervous/anxious.       OBJECTIVE:   /82 (Cuff Size: Adult Large)   Pulse 68   Temp 98.3  F (36.8  C) (Tympanic)   Resp 16   Ht 1.6 m (5' 3\")   Wt 85.7 kg (189 lb)   LMP 09/13/2022 (Approximate)   BMI 33.48 kg/m    Physical Exam  GENERAL: healthy, alert and no distress  EYES: Eyes grossly normal to inspection, PERRL and conjunctivae and sclerae normal  HENT: ear canals and TM's normal, nose and mouth without ulcers or lesions  NECK: no adenopathy, no asymmetry, masses, or scars and thyroid normal to palpation  RESP: lungs clear to auscultation - no rales, rhonchi or wheezes  BREAST: normal without masses, tenderness or nipple discharge and no palpable axillary masses or adenopathy  CV: regular rate and rhythm, normal S1 S2, no S3 or S4, no murmur, click or rub, no peripheral edema and peripheral pulses strong  ABDOMEN: soft, nontender, no hepatosplenomegaly, no masses and bowel sounds normal   (female): normal female external genitalia, normal urethral meatus, vaginal mucosa pink, moist, well rugated, and normal cervix/adnexa/uterus without masses or discharge  MS: no gross musculoskeletal defects noted, no edema  SKIN: no suspicious " "lesions or rashes  NEURO: Normal strength and tone, mentation intact and speech normal  PSYCH: mentation appears normal, affect normal/bright    Diagnostic Test Results:  Labs reviewed in Epic    ASSESSMENT/PLAN:   (Z00.00) Routine general medical examination at a health care facility  (primary encounter diagnosis)  Comment: No concerns today    (Z01.419) Encounter for gynecological examination without abnormal finding  Comment: updated today  Plan: Gynecologic Cytology (PAP)          (F32.0) Mild major depression (H)  Comment: improved with the Fluoxetine.   Plan: FLUoxetine (PROZAC) 20 MG capsule          (F41.1) Generalized anxiety disorder  Comment: not controlled.  Plan: FLUoxetine (PROZAC) 20 MG capsule          COUNSELING:  Reviewed preventive health counseling, as reflected in patient instructions    Estimated body mass index is 33.48 kg/m  as calculated from the following:    Height as of this encounter: 1.6 m (5' 3\").    Weight as of this encounter: 85.7 kg (189 lb).        She reports that she quit smoking about 12 years ago. Her smoking use included cigarettes. She has never used smokeless tobacco.      Counseling Resources:  ATP IV Guidelines  Pooled Cohorts Equation Calculator  Breast Cancer Risk Calculator  BRCA-Related Cancer Risk Assessment: FHS-7 Tool  FRAX Risk Assessment  ICSI Preventive Guidelines  Dietary Guidelines for Americans, 2010  USDA's MyPlate  ASA Prophylaxis  Lung CA Screening    [facility]  Answers for HPI/ROS submitted by the patient on 10/10/2022  If you checked off any problems, how difficult have these problems made it for you to do your work, take care of things at home, or get along with other people?: Not difficult at all  PHQ9 TOTAL SCORE: 7      "

## 2022-10-13 LAB
BKR LAB AP GYN ADEQUACY: NORMAL
BKR LAB AP GYN INTERPRETATION: NORMAL
BKR LAB AP HPV REFLEX: NORMAL
BKR LAB AP PREVIOUS ABNL DX: NORMAL
BKR LAB AP PREVIOUS ABNORMAL: NORMAL
PATH REPORT.COMMENTS IMP SPEC: NORMAL
PATH REPORT.COMMENTS IMP SPEC: NORMAL
PATH REPORT.RELEVANT HX SPEC: NORMAL

## 2022-10-14 LAB
HUMAN PAPILLOMA VIRUS 16 DNA: NEGATIVE
HUMAN PAPILLOMA VIRUS 18 DNA: NEGATIVE
HUMAN PAPILLOMA VIRUS FINAL DIAGNOSIS: ABNORMAL
HUMAN PAPILLOMA VIRUS OTHER HR: POSITIVE

## 2022-10-17 ENCOUNTER — PATIENT OUTREACH (OUTPATIENT)
Dept: FAMILY MEDICINE | Facility: CLINIC | Age: 37
End: 2022-10-17

## 2022-11-06 ENCOUNTER — HOSPITAL ENCOUNTER (EMERGENCY)
Facility: CLINIC | Age: 37
Discharge: HOME | End: 2022-11-06
Attending: FAMILY MEDICINE | Admitting: FAMILY MEDICINE
Payer: COMMERCIAL

## 2022-11-06 ENCOUNTER — APPOINTMENT (OUTPATIENT)
Dept: GENERAL RADIOLOGY | Facility: CLINIC | Age: 37
End: 2022-11-06
Attending: FAMILY MEDICINE
Payer: COMMERCIAL

## 2022-11-06 VITALS
SYSTOLIC BLOOD PRESSURE: 154 MMHG | RESPIRATION RATE: 18 BRPM | HEART RATE: 82 BPM | OXYGEN SATURATION: 100 % | DIASTOLIC BLOOD PRESSURE: 103 MMHG | TEMPERATURE: 98.2 F

## 2022-11-06 DIAGNOSIS — R00.2 PALPITATIONS: ICD-10-CM

## 2022-11-06 DIAGNOSIS — R07.89 ATYPICAL CHEST PAIN: ICD-10-CM

## 2022-11-06 LAB
ALBUMIN SERPL-MCNC: 3.6 G/DL (ref 3.4–5)
ALP SERPL-CCNC: 92 U/L (ref 40–150)
ALT SERPL W P-5'-P-CCNC: 29 U/L (ref 0–50)
ANION GAP SERPL CALCULATED.3IONS-SCNC: 9 MMOL/L (ref 3–14)
AST SERPL W P-5'-P-CCNC: 17 U/L (ref 0–45)
BASOPHILS # BLD AUTO: 0.1 10E3/UL (ref 0–0.2)
BASOPHILS NFR BLD AUTO: 1 %
BILIRUB SERPL-MCNC: 0.3 MG/DL (ref 0.2–1.3)
BUN SERPL-MCNC: 16 MG/DL (ref 7–30)
CALCIUM SERPL-MCNC: 8.7 MG/DL (ref 8.5–10.1)
CHLORIDE BLD-SCNC: 108 MMOL/L (ref 94–109)
CO2 SERPL-SCNC: 22 MMOL/L (ref 20–32)
CREAT SERPL-MCNC: 0.61 MG/DL (ref 0.52–1.04)
D DIMER PPP FEU-MCNC: 0.29 UG/ML FEU (ref 0–0.5)
EOSINOPHIL # BLD AUTO: 0.2 10E3/UL (ref 0–0.7)
EOSINOPHIL NFR BLD AUTO: 2 %
ERYTHROCYTE [DISTWIDTH] IN BLOOD BY AUTOMATED COUNT: 13.6 % (ref 10–15)
FLUAV RNA SPEC QL NAA+PROBE: NEGATIVE
FLUBV RNA RESP QL NAA+PROBE: NEGATIVE
GFR SERPL CREATININE-BSD FRML MDRD: >90 ML/MIN/1.73M2
GLUCOSE BLD-MCNC: 92 MG/DL (ref 70–99)
HCT VFR BLD AUTO: 38 % (ref 35–47)
HGB BLD-MCNC: 13 G/DL (ref 11.7–15.7)
IMM GRANULOCYTES # BLD: 0 10E3/UL
IMM GRANULOCYTES NFR BLD: 0 %
LYMPHOCYTES # BLD AUTO: 4 10E3/UL (ref 0.8–5.3)
LYMPHOCYTES NFR BLD AUTO: 41 %
MCH RBC QN AUTO: 28.9 PG (ref 26.5–33)
MCHC RBC AUTO-ENTMCNC: 34.2 G/DL (ref 31.5–36.5)
MCV RBC AUTO: 84 FL (ref 78–100)
MONOCYTES # BLD AUTO: 0.8 10E3/UL (ref 0–1.3)
MONOCYTES NFR BLD AUTO: 8 %
NEUTROPHILS # BLD AUTO: 4.7 10E3/UL (ref 1.6–8.3)
NEUTROPHILS NFR BLD AUTO: 48 %
NRBC # BLD AUTO: 0 10E3/UL
NRBC BLD AUTO-RTO: 0 /100
PLATELET # BLD AUTO: 533 10E3/UL (ref 150–450)
POTASSIUM BLD-SCNC: 3.8 MMOL/L (ref 3.4–5.3)
PROT SERPL-MCNC: 7.7 G/DL (ref 6.8–8.8)
RBC # BLD AUTO: 4.5 10E6/UL (ref 3.8–5.2)
RSV RNA SPEC NAA+PROBE: NEGATIVE
SARS-COV-2 RNA RESP QL NAA+PROBE: NEGATIVE
SODIUM SERPL-SCNC: 139 MMOL/L (ref 133–144)
TROPONIN I SERPL HS-MCNC: <3 NG/L
TSH SERPL DL<=0.005 MIU/L-ACNC: 3.88 MU/L (ref 0.4–4)
WBC # BLD AUTO: 9.7 10E3/UL (ref 4–11)

## 2022-11-06 PROCEDURE — 87637 SARSCOV2&INF A&B&RSV AMP PRB: CPT | Performed by: FAMILY MEDICINE

## 2022-11-06 PROCEDURE — 80053 COMPREHEN METABOLIC PANEL: CPT | Performed by: FAMILY MEDICINE

## 2022-11-06 PROCEDURE — 99285 EMERGENCY DEPT VISIT HI MDM: CPT | Mod: 25 | Performed by: FAMILY MEDICINE

## 2022-11-06 PROCEDURE — 36415 COLL VENOUS BLD VENIPUNCTURE: CPT | Performed by: FAMILY MEDICINE

## 2022-11-06 PROCEDURE — 85004 AUTOMATED DIFF WBC COUNT: CPT | Performed by: FAMILY MEDICINE

## 2022-11-06 PROCEDURE — 71045 X-RAY EXAM CHEST 1 VIEW: CPT

## 2022-11-06 PROCEDURE — 84484 ASSAY OF TROPONIN QUANT: CPT | Performed by: FAMILY MEDICINE

## 2022-11-06 PROCEDURE — 84443 ASSAY THYROID STIM HORMONE: CPT | Performed by: FAMILY MEDICINE

## 2022-11-06 PROCEDURE — 93010 ELECTROCARDIOGRAM REPORT: CPT | Performed by: FAMILY MEDICINE

## 2022-11-06 PROCEDURE — 93005 ELECTROCARDIOGRAM TRACING: CPT | Performed by: FAMILY MEDICINE

## 2022-11-06 PROCEDURE — 85379 FIBRIN DEGRADATION QUANT: CPT | Performed by: FAMILY MEDICINE

## 2022-11-06 PROCEDURE — C9803 HOPD COVID-19 SPEC COLLECT: HCPCS | Performed by: FAMILY MEDICINE

## 2022-11-06 ASSESSMENT — ACTIVITIES OF DAILY LIVING (ADL): ADLS_ACUITY_SCORE: 35

## 2022-11-06 NOTE — DISCHARGE INSTRUCTIONS
We did not find a worrisome cause for your symptoms.  Your EKG, chest x-ray and blood work were all reassuring.  Continue to monitor for further symptoms, and follow-up with your primary care provider in 3-5 days if symptoms persist.  Return to the emergency department at any time if you develop new or worsening symptoms.

## 2022-11-06 NOTE — ED PROVIDER NOTES
[facility] ED Provider Note   Patient: Tram Bal  MRN #:  [id number]  Date of Visit: November 6, 2022    CC:     Chief Complaint   Patient presents with     Palpitations     HPI:  Tram Bal is a 37 year old female who presented to the emergency department with acute onset of atypical left-sided chest pain that occurred 2 hours ago precipitating a bout of palpitations with sensation of racing and pounding of the chest.  Patient was diagnosed with Graves' disease 6 months ago, and has since been seeing an endocrinologist and taking her medications.  Her last TSH level was normalizing.  Patient reports that the left-sided chest pain is very faint now but she still has a sensation of heart racing.  She has not had anything like this before.  She has not been feeling well the last couple of days, and reports that she had COVID 2 years ago.  She works as a  and was working tonight when her symptoms started.  Patient does not have any nausea, vomiting, unilateral calf pain or ankle swelling.  She has a history of generalized anxiety disorder.    Problem List:  Patient Active Problem List    Diagnosis Date Noted     Iron deficiency 01/16/2020     Priority: Medium     Vitamin D deficiency 10/28/2018     Priority: Medium     Mild major depression (H)      Priority: Medium     Class 1 obesity due to excess calories without serious comorbidity with body mass index (BMI) of 32.0 to 32.9 in adult 08/07/2018     Priority: Medium     Bariatric surgery status 01/10/2017     Priority: Medium     Overweight (BMI 25.0-29.9) 01/10/2017     Priority: Medium     Generalized anxiety disorder 03/29/2016     Priority: Medium     Routine general medical examination at a health care facility 02/20/2013     Priority: Medium     Encounter for routine gynecological examination 02/20/2013     Priority: Medium     Problem list name updated by automated  process. Provider to review       Encounter for other general counseling or advice on contraception 02/20/2013     Priority: Medium     Diagnosis updated by automated process. Provider to review and confirm.       Tobacco abuse, in remission 07/13/2011     Priority: Medium     LAP-BAND surgery status 05/16/2011     Priority: Medium     CARDIOVASCULAR SCREENING; LDL GOAL LESS THAN 160 10/31/2010     Priority: Medium     GERD without esophagitis 04/19/2010     Priority: Medium     Displacement of lumbar intervertebral disc 05/15/2008     Priority: Medium     Carrier or suspected carrier of group B Streptococcus 08/20/2007     Priority: Medium     LABOR PROTOCOL       Encounter for supervision of other normal pregnancy 03/09/2007     Priority: Medium     Diagnosis updated by automated process. Provider to review and confirm.       JARETH III (cervical intraepithelial neoplasia III) 08/22/2005     Priority: Medium     7/04 LSIL   10/04 Lester JARETH I  4/05 LSIL  6/05 Lester JARETH II & III  8/05 LEEP JARETH III  12/05 NIL pap  4/06 ASCUS, neg HPV  10/06, 11/07, 12/08 NIL paps  1/10 ASCUS, neg HPV  2011, 2012, 2013 NIL paps  3/31/14 NIL/neg HPV. Plan cotest in 1 year.  4/20/15 NIL/neg HPV. Plan: cotest in 3 years.  4/25/16 ASCUS, Neg HPV. Cotest in 1 year.   4/28/17 ASCUS, +High Risk HPV (Neg 16/18).  Plan Lester  5/22/17 Lester Benign. ASCUS pap, +HR HPV (Not types 16/18). Plan cotest in 1 year.   10/26/18 NIL Pap, + HR HPV (Neg 16/18). Plan colp  4/1/19 Lester ECC - atypia, favor reactive change.  NIL pap, + HR HPV (not 16 or 18). Plan: cotest in 1 yr   11/23/20 NIL Pap, + HR HPV (neg 16/18). Lester due by 2/23/2021 6/29/21 Lester ECC - no JARETH. Plan: cotest in 1 year  8/8/22 Lost to follow-up for pap tracking   10/10/22 NIL pap, + HR HPV (not 16 or 18) colp by 1/10/23  10/17/22 left msg to call back. Inflectionhart sent/pt read mychart       Screening examination for pulmonary tuberculosis 04/07/2005     Priority: Medium       Past Medical History:    Diagnosis Date     Abnormal Pap smear of cervix      Cervical high risk HPV (human papillomavirus) test positive      Depressive disorder 2016     Displacement of lumbar intervertebral disc 05/15/2008     Esophageal reflux      Hx of colposcopy with cervical biopsy 2017       MEDS: acyclovir (ZOVIRAX) 800 MG tablet  cyclobenzaprine (FLEXERIL) 10 MG tablet  etonogestrel-ethinyl estradiol (NUVARING) 0.12-0.015 MG/24HR vaginal ring  FLUoxetine (PROZAC) 20 MG capsule  methimazole (TAPAZOLE) 5 MG tablet  Multiple Vitamins-Iron TABS  omeprazole (PRILOSEC) 40 MG DR capsule  propranolol (INDERAL) 10 MG tablet        ALLERGIES:    Allergies   Allergen Reactions     Famvir [Famciclovir] Hives       Past Surgical History:   Procedure Laterality Date     COLPOSCOPY,LOOP ELECTRD CERVIX EXCIS  2005    JARETH III     GI SURGERY  11    LAP BANDING for obesity, pre-DM     ZZC NONSPECIFIC PROCEDURE      Oral surgery       Social History     Tobacco Use     Smoking status: Former     Packs/day: 0.00     Years: 3.00     Pack years: 0.00     Types: Cigarettes     Quit date: 2010     Years since quittin.5     Smokeless tobacco: Never     Tobacco comments:     quit smoking may 2010    Vaping Use     Vaping Use: Never used   Substance Use Topics     Alcohol use: Yes     Comment: occasionally     Drug use: No         Review of Systems   Except as noted in HPI, all other systems were reviewed and are negative    Physical Exam     Vitals were reviewed  Patient Vitals for the past 8 hrs:   BP Temp Temp src Pulse Resp SpO2   22 0046 (!) 154/103 98.2  F (36.8  C) Temporal 82 18 100 %     GENERAL APPEARANCE: Alert and oriented x3, tearful, no acute respiratory distress  FACE: normal facies  EYES: Pupils are equal  HENT: normal external exam  NECK: no adenopathy or asymmetry  RESP: normal respiratory effort; clear breath sounds bilaterally  CV: regular rate and rhythm; no significant murmurs, gallops or  rubs  ABD: soft, no tenderness; no rebound or guarding; bowel sounds are normal  MS: no gross deformities noted; normal muscle tone.  EXT: No calf tenderness or pitting edema  SKIN: no worrisome rash  NEURO: no facial droop; no focal deficits, speech is normal  PSYCH: Patient endorses feelings of anxiety      Available Lab/Imaging Results     Results for orders placed or performed during the hospital encounter of 11/06/22 (from the past 24 hour(s))   CBC with platelets differential    Narrative    The following orders were created for panel order CBC with platelets differential.  Procedure                               Abnormality         Status                     ---------                               -----------         ------                     CBC with platelets and d...[[id number]]  Abnormal            Final result                 Please view results for these tests on the individual orders.   Comprehensive metabolic panel   Result Value Ref Range    Sodium 139 133 - 144 mmol/L    Potassium 3.8 3.4 - 5.3 mmol/L    Chloride 108 94 - 109 mmol/L    Carbon Dioxide (CO2) 22 20 - 32 mmol/L    Anion Gap 9 3 - 14 mmol/L    Urea Nitrogen 16 7 - 30 mg/dL    Creatinine 0.61 0.52 - 1.04 mg/dL    Calcium 8.7 8.5 - 10.1 mg/dL    Glucose 92 70 - 99 mg/dL    Alkaline Phosphatase 92 40 - 150 U/L    AST 17 0 - 45 U/L    ALT 29 0 - 50 U/L    Protein Total 7.7 6.8 - 8.8 g/dL    Albumin 3.6 3.4 - 5.0 g/dL    Bilirubin Total 0.3 0.2 - 1.3 mg/dL    GFR Estimate >90 >60 mL/min/1.73m2   Troponin I (now)   Result Value Ref Range    Troponin I High Sensitivity <3 <54 ng/L   CBC with platelets and differential   Result Value Ref Range    WBC Count 9.7 4.0 - 11.0 10e3/uL    RBC Count 4.50 3.80 - 5.20 10e6/uL    Hemoglobin 13.0 11.7 - 15.7 g/dL    Hematocrit 38.0 35.0 - 47.0 %    MCV 84 78 - 100 fL    MCH 28.9 26.5 - 33.0 pg    MCHC 34.2 31.5 - 36.5 g/dL    RDW 13.6 10.0 - 15.0 %    Platelet Count 533 (H) 150 - 450 10e3/uL    %  Neutrophils 48 %    % Lymphocytes 41 %    % Monocytes 8 %    % Eosinophils 2 %    % Basophils 1 %    % Immature Granulocytes 0 %    NRBCs per 100 WBC 0 <1 /100    Absolute Neutrophils 4.7 1.6 - 8.3 10e3/uL    Absolute Lymphocytes 4.0 0.8 - 5.3 10e3/uL    Absolute Monocytes 0.8 0.0 - 1.3 10e3/uL    Absolute Eosinophils 0.2 0.0 - 0.7 10e3/uL    Absolute Basophils 0.1 0.0 - 0.2 10e3/uL    Absolute Immature Granulocytes 0.0 <=0.4 10e3/uL    Absolute NRBCs 0.0 10e3/uL   TSH   Result Value Ref Range    TSH 3.88 0.40 - 4.00 mU/L   D dimer quantitative   Result Value Ref Range    D-Dimer Quantitative 0.29 0.00 - 0.50 ug/mL FEU    Narrative    This D-dimer assay is intended for use in conjunction with a clinical pretest probability assessment model to exclude pulmonary embolism (PE) and deep venous thrombosis (DVT) in outpatients suspected of PE or DVT. The cut-off value is 0.50 ug/mL FEU.   Symptomatic; Yes; 11/3/2022 Influenza A/B & SARS-CoV2 (COVID-19) Virus PCR Multiplex Nasopharyngeal    Specimen: Nasopharyngeal; Swab   Result Value Ref Range    Influenza A PCR Negative Negative    Influenza B PCR Negative Negative    RSV PCR Negative Negative    SARS CoV2 PCR Negative Negative    Narrative    Testing was performed using the Xpert Xpress CoV2/Flu/RSV Assay on the [facility] GeneXpert Instrument. This test should be ordered for the detection of SARS-CoV-2 and influenza viruses in individuals who meet clinical and/or epidemiological criteria. Test performance is unknown in asymptomatic patients. This test is for in vitro diagnostic use under the FDA EUA for laboratories certified under CLIA to perform high or moderate complexity testing. This test has not been FDA cleared or approved. A negative result does not rule out the presence of PCR inhibitors in the specimen or target RNA in concentration below the limit of detection for the assay. If only one viral target is positive but coinfection with multiple targets is  suspected, the sample should be re-tested with another FDA cleared, approved, or authorized test, if coinfection would change clinical management. This test was validated by the [facility]. These laboratories are certified under the Clinical Laboratory Improvement Amendments of 1988 (CLIA-88) as qualified to perform high complexity laboratory testing.   XR Chest Port 1 View    Narrative    EXAM: XR CHEST PORT 1 VIEW  LOCATION: [facility]  DATE/TIME: 11/6/2022 1:39 AM    INDICATION: palpitations; left sided chest pain  COMPARISON: 04/11/2011      Impression    IMPRESSION: Stable chest with no acute cardiopulmonary findings.     EKG reviewed by me: Normal sinus rhythm with heart rate of 74.  NC interval of 206 ms, QT interval of 380 ms, QRS duration of 82 ms, normal axis.  No acute ST-T wave changes.  Impression: Normal ECG.        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  This precipitated symptoms of palpitations.  Patient initially thought that it was triggering her Graves' disease, but she reports that the chest pain resolved but was still having the racing of the heart.  She has a smart watch, and noted that her heart rate was 106.  Patient was seen shortly after arrival.  Temperature is 98.2, blood pressure 154/103, heart rate of 82, respiratory rate of 18 with 100% oxygen saturation.  The patient's blood pressure normalized over the course of her visit.  She felt more relaxed.  Her work-up revealed normal EKG, chest x-ray, CBC, comprehensive metabolic panel, troponin and D-dimer level.  Chest x-ray was stable with no acute cardiopulmonary findings.  She had been sick for couple days, and her influenza and  COVID tests were negative.  Patient was reassured that there was no worrisome cause for symptoms.  She might of had an anxiety attack brought on by the episode of atypical chest pain which is now resolved.  Patient expressed understanding and agreement with discharge instructions below.      Written after-visit summary and instructions were given at the time of discharge.    Follow up Plan:   Trudi Guevara APRN CNP  [address] [facility] [address]  [phone]    In 4 days  if not improving    [facility] Emergency Dept  [address] [place] [address] [phone]    If symptoms worsen      Discharge Instructions:   We did not find a worrisome cause for your symptoms.  Your EKG, chest x-ray and blood work were all reassuring.  Continue to monitor for further symptoms, and follow-up with your primary care provider in 3-5 days if symptoms persist.  Return to the emergency department at any time if you develop new or worsening symptoms.       Disclaimer: This note consists of words and symbols derived from keyboarding and dictation using voice recognition software.  As a result, there may be errors that have gone undetected.  Please consider this when interpreting information found in this note.       Vick Wise MD  11/06/22 0123

## 2022-11-07 DIAGNOSIS — E05.00 GRAVES DISEASE: ICD-10-CM

## 2022-11-07 RX ORDER — PROPRANOLOL HYDROCHLORIDE 10 MG/1
TABLET ORAL
Qty: 60 TABLET | Refills: 3 | Status: SHIPPED | OUTPATIENT
Start: 2022-11-07 | End: 2023-05-22

## 2022-11-07 NOTE — TELEPHONE ENCOUNTER
"Last Written Prescription Date:  10/4/22  Last Fill Quantity: 60,  # refills: 1   Last office visit: 7/21/22 with Dr. Shanks  Future Office Visit:  1/26/23        Requested Prescriptions   Pending Prescriptions Disp Refills     propranolol (INDERAL) 10 MG tablet [Pharmacy Med Name: PROPRANOLOL 10MG TABLETS] 60 tablet 1     Sig: TAKE 1 TABLET(10 MG) BY MOUTH TWICE DAILY       Beta-Blockers Protocol Failed - 11/7/2022 10:39 AM        Failed - Blood pressure under 140/90 in past 12 months     BP Readings from Last 3 Encounters:   11/06/22 (!) 154/103   10/10/22 128/82   07/21/22 127/87                 Passed - Patient is age 6 or older        Passed - Recent (12 mo) or future (30 days) visit within the authorizing provider's specialty     Patient has had an office visit with the authorizing provider or a provider within the authorizing providers department within the previous 12 mos or has a future within next 30 days. See \"Patient Info\" tab in inbasket, or \"Choose Columns\" in Meds & Orders section of the refill encounter.              Passed - Medication is active on med list         Refills sent per protocol  Pebbles Blue RN      "

## 2022-11-09 ENCOUNTER — VIRTUAL VISIT (OUTPATIENT)
Dept: FAMILY MEDICINE | Facility: CLINIC | Age: 37
End: 2022-11-09
Payer: COMMERCIAL

## 2022-11-09 DIAGNOSIS — F32.0 MILD MAJOR DEPRESSION (H): Primary | ICD-10-CM

## 2022-11-09 DIAGNOSIS — F41.1 GENERALIZED ANXIETY DISORDER: ICD-10-CM

## 2022-11-09 PROCEDURE — 99214 OFFICE O/P EST MOD 30 MIN: CPT | Mod: 95 | Performed by: NURSE PRACTITIONER

## 2022-11-09 RX ORDER — FLUOXETINE 40 MG/1
40 CAPSULE ORAL DAILY
Qty: 30 CAPSULE | Refills: 3 | Status: SHIPPED | OUTPATIENT
Start: 2022-11-09 | End: 2023-03-09

## 2022-11-09 RX ORDER — HYDROXYZINE HYDROCHLORIDE 25 MG/1
12.5-25 TABLET, FILM COATED ORAL 3 TIMES DAILY PRN
Qty: 30 TABLET | Refills: 1 | Status: SHIPPED | OUTPATIENT
Start: 2022-11-09

## 2022-11-09 ASSESSMENT — ANXIETY QUESTIONNAIRES
1. FEELING NERVOUS, ANXIOUS, OR ON EDGE: NEARLY EVERY DAY
GAD7 TOTAL SCORE: 20
3. WORRYING TOO MUCH ABOUT DIFFERENT THINGS: NEARLY EVERY DAY
5. BEING SO RESTLESS THAT IT IS HARD TO SIT STILL: NEARLY EVERY DAY
6. BECOMING EASILY ANNOYED OR IRRITABLE: NEARLY EVERY DAY
7. FEELING AFRAID AS IF SOMETHING AWFUL MIGHT HAPPEN: MORE THAN HALF THE DAYS
GAD7 TOTAL SCORE: 20
2. NOT BEING ABLE TO STOP OR CONTROL WORRYING: NEARLY EVERY DAY

## 2022-11-09 ASSESSMENT — PATIENT HEALTH QUESTIONNAIRE - PHQ9
SUM OF ALL RESPONSES TO PHQ QUESTIONS 1-9: 24
5. POOR APPETITE OR OVEREATING: NEARLY EVERY DAY

## 2022-11-09 NOTE — PROGRESS NOTES
Tram is a 37 year old who is being evaluated via a billable video visit.      How would you like to obtain your AVS? MyChart  If the video visit is dropped, the invitation should be resent by: Text to cell phone: [phone]  Will anyone else be joining your video visit? No    Assessment & Plan     Mild major depression (H)  Not controlled.  Plan to increase the fluoxetine to 40 mg daily.  Potential side effects discussed including but not limited to increased risk of self harm or suicide. Referral to counseling placed. Recheck in 1-2 months, sooner if needed  - FLUoxetine (PROZAC) 40 MG capsule; Take 1 capsule (40 mg) by mouth daily    Generalized anxiety disorder  Not controlled. Per above.  Hydroxyzine as needed for anxiety.   - FLUoxetine (PROZAC) 40 MG capsule; Take 1 capsule (40 mg) by mouth daily  -hydroxyzine     Return in about 4 weeks (around 2022) for Depression check.    DEON Torres [facility]    Subjective   Tram is a 37 year old, presenting for the following health issues:  Mental Health Problem      HPI     Depression and Anxiety Follow-Up    How are you doing with your depression since your last visit? Worsened     How are you doing with your anxiety since your last visit?  Worsened     Are you having other symptoms that might be associated with depression or anxiety? Yes:  . Panic attack    Have you had a significant life event? No     Do you have any concerns with your use of alcohol or other drugs? No    Social History     Tobacco Use     Smoking status: Former     Packs/day: 0.00     Years: 3.00     Pack years: 0.00     Types: Cigarettes     Quit date: 2010     Years since quittin.5     Smokeless tobacco: Never     Tobacco comments:     quit smoking may 2010    Vaping Use     Vaping Use: Never used   Substance Use Topics     Alcohol use: Yes     Comment: occasionally     Drug use: No     PHQ 2022 10/10/2022 2022   PHQ-9 Total  Score 9 7 24   Q9: Thoughts of better off dead/self-harm past 2 weeks Not at all Not at all Not at all     CLARA-7 SCORE 4/20/2022 10/10/2022 11/9/2022   Total Score 17 (severe anxiety) - -   Total Score 17 5 20       Review of Systems   Constitutional, HEENT, cardiovascular, pulmonary, gi and gu systems are negative, except as otherwise noted.      Objective           Vitals:  No vitals were obtained today due to virtual visit.    Physical Exam   GENERAL: Healthy, alert and no distress  EYES: Eyes grossly normal to inspection.  No discharge or erythema, or obvious scleral/conjunctival abnormalities.  RESP: No audible wheeze, cough, or visible cyanosis.  No visible retractions or increased work of breathing.    SKIN: Visible skin clear. No significant rash, abnormal pigmentation or lesions.  NEURO: Cranial nerves grossly intact.  Mentation and speech appropriate for age.  PSYCH: Mentation appears normal, affect normal/bright, judgement and insight intact, normal speech and appearance well-groomed.          Video-Visit Details    Video Start Time: 5:20 PM    Type of service:  Video Visit    Video End Time:5:28 PM    Originating Location (pt. Location): Home    Distant Location (provider location):  On-site    Platform used for Video Visit: Stefanie

## 2022-11-20 ENCOUNTER — HEALTH MAINTENANCE LETTER (OUTPATIENT)
Age: 37
End: 2022-11-20

## 2022-12-05 ENCOUNTER — TELEPHONE (OUTPATIENT)
Dept: FAMILY MEDICINE | Facility: CLINIC | Age: 37
End: 2022-12-05

## 2022-12-05 NOTE — TELEPHONE ENCOUNTER
Please call patient back to schedule a colposcopy.    [phone] , call anytime, ok to leave a det.       Thanks      Fadi

## 2022-12-23 DIAGNOSIS — Z30.44 ENCOUNTER FOR SURVEILLANCE OF VAGINAL RING HORMONAL CONTRACEPTIVE DEVICE: ICD-10-CM

## 2022-12-23 RX ORDER — ETONOGESTREL AND ETHINYL ESTRADIOL .12; .015 MG/D; MG/D
RING VAGINAL
Qty: 3 EACH | Refills: 1 | Status: SHIPPED | OUTPATIENT
Start: 2022-12-23 | End: 2023-06-12

## 2023-01-06 ENCOUNTER — OFFICE VISIT (OUTPATIENT)
Dept: FAMILY MEDICINE | Facility: CLINIC | Age: 38
End: 2023-01-06
Payer: COMMERCIAL

## 2023-01-06 VITALS
HEIGHT: 64 IN | DIASTOLIC BLOOD PRESSURE: 82 MMHG | HEART RATE: 76 BPM | RESPIRATION RATE: 20 BRPM | OXYGEN SATURATION: 93 % | TEMPERATURE: 98.1 F | BODY MASS INDEX: 31.58 KG/M2 | SYSTOLIC BLOOD PRESSURE: 124 MMHG | WEIGHT: 185 LBS

## 2023-01-06 DIAGNOSIS — R87.810 CERVICAL HIGH RISK HPV (HUMAN PAPILLOMAVIRUS) TEST POSITIVE: Primary | ICD-10-CM

## 2023-01-06 PROCEDURE — 90715 TDAP VACCINE 7 YRS/> IM: CPT | Performed by: FAMILY MEDICINE

## 2023-01-06 PROCEDURE — 90471 IMMUNIZATION ADMIN: CPT | Performed by: FAMILY MEDICINE

## 2023-01-06 PROCEDURE — 88305 TISSUE EXAM BY PATHOLOGIST: CPT | Performed by: PATHOLOGY

## 2023-01-06 PROCEDURE — 88175 CYTOPATH C/V AUTO FLUID REDO: CPT | Performed by: FAMILY MEDICINE

## 2023-01-06 PROCEDURE — 57456 ENDOCERV CURETTAGE W/SCOPE: CPT | Performed by: FAMILY MEDICINE

## 2023-01-06 PROCEDURE — 87624 HPV HI-RISK TYP POOLED RSLT: CPT | Performed by: FAMILY MEDICINE

## 2023-01-06 ASSESSMENT — PAIN SCALES - GENERAL: PAINLEVEL: NO PAIN (0)

## 2023-01-06 NOTE — PROGRESS NOTES
Referring Physician:  Ismael  Reason for Colposcopy:  10/10/22 NIL pap, + HR HPV (not 16 or 18)  [phone] (home)   There is no work phone number on file.  Marital status:  single  Number of pregnancies:  2         Children:   2  No LMP recorded.  Abnormal bleeding?No  Abnormal discharge? No  Age at first intercourse:  15  Number of sexual partners (lifetime):  15  Types of contraception (lifetime):  Pill, shot, ring  Smoker:  No  Allergies   Allergen Reactions     Famvir [Famciclovir] Hives     Current Outpatient Medications   Medication Sig Dispense Refill     acyclovir (ZOVIRAX) 800 MG tablet Take 1 tablet (800 mg) by mouth 3 times daily 21 tablet 2     cyclobenzaprine (FLEXERIL) 10 MG tablet Take 0.5-1 tablets (5-10 mg) by mouth 3 times daily as needed for muscle spasms 20 tablet 1     ELURYNG 0.12-0.015 MG/24HR vaginal ring INSERT ONE RING VAGINALLY EVERY 21 DAYS, THEN REMOVE FOR ONE WEEK, THEN REPEAT WITH NEW RING 3 each 1     FLUoxetine (PROZAC) 40 MG capsule Take 1 capsule (40 mg) by mouth daily 30 capsule 3     hydrOXYzine (ATARAX) 25 MG tablet Take 0.5-1 tablets (12.5-25 mg) by mouth 3 times daily as needed for anxiety 30 tablet 1     methimazole (TAPAZOLE) 5 MG tablet Take 1 tablet (5 mg) by mouth daily 90 tablet 3     Multiple Vitamins-Iron TABS Take by mouth daily        propranolol (INDERAL) 10 MG tablet TAKE 1 TABLET(10 MG) BY MOUTH TWICE DAILY 60 tablet 3       GYN: Colposcopy/LEEP    Date/Time: 1/9/2023 11:00 AM  Performed by: Batool Liang MD  Authorized by: Batool Liang MD     Consent:     Consent obtained:  Written    Consent given by:  Patient    Procedural risks discussed:  Bleeding, possible continued pain, failure rate, infection and repeat procedure    Patient questions answered: yes      Patient agrees, verbalizes understanding, and wants to proceed: yes      Educational handouts given: yes      Instructions and paperwork completed: yes    Pre-procedure:      Pre-procedure timeout performed: yes      Premeds:  Ibuprofen    Prepped with: acetic acid    Indication:     Indication:  HPV    HPV Indications:  Other high risk  Procedure:     Procedure: Colposcopy w/ endocervical curettage      Under satisfactory analgesia the patient was prepped and draped in the dorsal lithotomy position: yes      Roca speculum was placed in the vagina: yes      Under colposcopic examination the transition zone was seen in entirety: yes      Intracervical block was performed: no      Endocervix was curetted using a Kevorkian curette: yes      Tampon inserted: no      Monsel's solution was applied: no      Biopsy(s): no      Specimen to pathology: yes    Post-procedure:     Findings: Negative      Impression: Normal appearing cervix      Patient tolerance of procedure:  Patient tolerated the procedure well with no immediate complications      (R87.810) Cervical high risk HPV (human papillomavirus) test positive  (primary encounter diagnosis)  Comment: colpo today   Will notify pt of pathology when available     Plan: REVIEW OF HEALTH MAINTENANCE PROTOCOL ORDERS,         TDAP VACCINE (Adacel, Boostrix), Surgical         Pathology Exam, Pap diagnostic with HPV, HPV         Hold (Lab Only), GYN: Colposcopy/LEEP

## 2023-01-09 LAB
PATH REPORT.COMMENTS IMP SPEC: NORMAL
PATH REPORT.COMMENTS IMP SPEC: NORMAL
PATH REPORT.FINAL DX SPEC: NORMAL
PATH REPORT.GROSS SPEC: NORMAL
PATH REPORT.MICROSCOPIC SPEC OTHER STN: NORMAL
PATH REPORT.RELEVANT HX SPEC: NORMAL
PHOTO IMAGE: NORMAL

## 2023-01-11 LAB
BKR LAB AP GYN ADEQUACY: NORMAL
BKR LAB AP GYN INTERPRETATION: NORMAL
BKR LAB AP HPV REFLEX: NORMAL
BKR LAB AP LMP: NORMAL
BKR LAB AP PREVIOUS ABNL DX: NORMAL
BKR LAB AP PREVIOUS ABNORMAL: NORMAL
PATH REPORT.COMMENTS IMP SPEC: NORMAL
PATH REPORT.COMMENTS IMP SPEC: NORMAL
PATH REPORT.RELEVANT HX SPEC: NORMAL

## 2023-01-12 ENCOUNTER — PATIENT OUTREACH (OUTPATIENT)
Dept: FAMILY MEDICINE | Facility: CLINIC | Age: 38
End: 2023-01-12
Payer: COMMERCIAL

## 2023-01-26 ENCOUNTER — VIRTUAL VISIT (OUTPATIENT)
Dept: ENDOCRINOLOGY | Facility: CLINIC | Age: 38
End: 2023-01-26
Payer: COMMERCIAL

## 2023-01-26 DIAGNOSIS — R79.89 LOW SERUM THYROID STIMULATING HORMONE (TSH): ICD-10-CM

## 2023-01-26 DIAGNOSIS — E05.00 GRAVES DISEASE: Primary | ICD-10-CM

## 2023-01-26 PROCEDURE — 99213 OFFICE O/P EST LOW 20 MIN: CPT | Mod: 95 | Performed by: INTERNAL MEDICINE

## 2023-01-26 NOTE — LETTER
1/26/2023       RE: Tram Bal  [address]   [facility] [address]     Dear Colleague,    Thank you for referring your patient, Tram Bal, to the [facility] [place] at [facility]. Please see a copy of my visit note below.    Tram Bal  is being evaluated via a billable video visit.      How would you like to obtain your AVS? [employer]  For the video visit, send the invitation by: Text to cell phone: [phone]  Will anyone else be joining your video visit? No            Video-Visit Details    Type of service:  Video Visit  Video Start Time: 9:03  Video End Time:9:13  Originating Location (pt. Location): [place], [place]  Distant Location (provider location):  Home  Platform used for Video Visit: Stefanie Shanks MD    Tram Bal is a 37 year old year old female here for evaluation of hyperthyroidism/Grave's disease via a billable video visit. She was previously seen by Dr Gilbert/Jessie on 6/7 as urgent THADDEUS visit, seen by me July 2022. She aslo has PMHx of generalized anxiety disorder and GERD    No chief complaint on file.      1) Grave's Disease  Diagnosis April 2022 experiencing palpations without any other hyperthyroid symptoms, found to have TSH < 0.01, free T4: 1.25, TT3: 192 with TSI: 1.4. She was initially started on propranolol 20mg TID which helped improved symptoms. Does note 1-2 months prior to symptom development had contrasted CT with evaluation of stomach (gastric bypass) She was started on methimazole 5mg at visit in June    Treatment Methimazole 5mg daily, propranolol 20mg (2 tab) daily    INTERVAL HISTORY:  Palpations and insomnia have improved, does notice that if misses propranolol will have heart fluttering.   PCP recommended continuing propranolol for anxiety symptoms  Notes mild fatigue, this has been consistent since time of diagnosis without much fluctuation.  Denies signs/symptoms of  hypo/hyperthyroidism including hot/cold intolerance, diarrhea/constipation, major changes in hair skin or nails, tremor, palpations, or fatigue.  Denies any difficulty breathing, difficulty swallowing, shortness of breath, enlargement of neck/thyroid, family history of thyroid cancer, exposure to radiation, hoarseness or weight loss.                Active diagnoses this visit:     Low serum thyroid stimulating hormone (TSH)  Graves disease     ROS: 10 point ROS neg other than the symptoms noted above in the HPI.      Medical, surgical, social, and family histories, medications and allergies reviewed and updated.  Past Medical History:   Diagnosis Date     Abnormal Pap smear of cervix 2004, , , ,      Cervical high risk HPV (human papillomavirus) test positive 2017,      Depressive disorder 2016     Displacement of lumbar intervertebral disc 05/15/2008     Esophageal reflux      Hx of colposcopy with cervical biopsy 2017:[place]- Benign.        Past Surgical History:   Procedure Laterality Date     COLPOSCOPY,LOOP ELECTRD CERVIX EXCIS  2005    JARETH III     GI SURGERY  11    LAP BANDING for obesity, pre-DM     ZZC NONSPECIFIC PROCEDURE      Oral surgery       Allergies:  Famvir [famciclovir]    Social History     Tobacco Use     Smoking status: Former     Packs/day: 0.00     Years: 3.00     Pack years: 0.00     Types: Cigarettes     Quit date: 2010     Years since quittin.7     Smokeless tobacco: Never     Tobacco comments:     quit smoking may 2010    Substance Use Topics     Alcohol use: Yes     Comment: occasionally       Family History   Problem Relation Age of Onset     Thyroid Disease Mother      Unknown/Adopted Mother      Hyperlipidemia Father      Hypertension Paternal Grandmother      Hyperlipidemia Paternal Grandmother      Diabetes Paternal Aunt      Hypertension Paternal Aunt      Lipids Paternal Aunt      Thyroid Disease Maternal  Half-Brother      Unknown/Adopted No family hx of         unaware of maternal grandparents history as pt mom was adopted     Breast Cancer No family hx of      Cancer - colorectal No family hx of      Gynecology No family hx of         no ovarian cancer     Coronary Artery Disease No family hx of      Cerebrovascular Disease No family hx of      Thyroid Disease No family hx of      Osteoporosis No family hx of          Objective:  LMP 01/06/2023     Physical Exam (visual exam)  VS:  no vital signs taken for video visit  CONSTITUTIONAL: healthy, alert and NAD, responding appropriately  ENT: normocephalic, no visual evidence of trauma, normal nose and oral mucosa  EYES: conjunctivae and sclerae normal, no exophthalmos or proptosis  THYROID:  no visualized nodules or goiter  LUNGS: no audible wheeze, cough or visible cyanosis, no visible retractions or increased work of breathing  EXTREMITIES: no hand tremors  NEUROLOGY: cranial nerves grossly intact with no obvious deficit.  SKIN:  no visualized skin lesions or rash, no edema visualized  PSYCH: mentation appears normal, normal judgement        Lab Results   Component Value Date/Time    TSH 3.88 11/06/2022 01:05 AM    TSH 1.20 10/26/2018 02:44 PM    T4 0.83 08/27/2022 12:06 PM    PTHI 21 01/14/2020 01:32 PM     Last Comprehensive Metabolic Panel:  Sodium   Date Value Ref Range Status   11/06/2022 139 133 - 144 mmol/L Final   10/26/2018 141 133 - 144 mmol/L Final     Potassium   Date Value Ref Range Status   11/06/2022 3.8 3.4 - 5.3 mmol/L Final   10/26/2018 4.1 3.4 - 5.3 mmol/L Final     Chloride   Date Value Ref Range Status   11/06/2022 108 94 - 109 mmol/L Final   10/26/2018 107 94 - 109 mmol/L Final     Carbon Dioxide   Date Value Ref Range Status   10/26/2018 24 20 - 32 mmol/L Final     Carbon Dioxide (CO2)   Date Value Ref Range Status   11/06/2022 22 20 - 32 mmol/L Final     Anion Gap   Date Value Ref Range Status   11/06/2022 9 3 - 14 mmol/L Final   10/26/2018  10 3 - 14 mmol/L Final     Glucose   Date Value Ref Range Status   11/06/2022 92 70 - 99 mg/dL Final   10/26/2018 86 70 - 99 mg/dL Final     Urea Nitrogen   Date Value Ref Range Status   11/06/2022 16 7 - 30 mg/dL Final   10/26/2018 10 7 - 30 mg/dL Final     Creatinine   Date Value Ref Range Status   11/06/2022 0.61 0.52 - 1.04 mg/dL Final   10/26/2018 0.68 0.52 - 1.04 mg/dL Final     GFR Estimate   Date Value Ref Range Status   11/06/2022 >90 >60 mL/min/1.73m2 Final     Comment:     Effective December 21, 2021 eGFRcr in adults is calculated using the 2021 CKD-EPI creatinine equation which includes age and gender (Torito et al., NEJM, DOI: 10.1056/YDHCqk9273404)   10/26/2018 >90 >60 mL/min/1.7m2 Final     Comment:     Non  GFR Calc     Calcium   Date Value Ref Range Status   11/06/2022 8.7 8.5 - 10.1 mg/dL Final   10/26/2018 8.4 (L) 8.5 - 10.1 mg/dL Final       ASSESSMENT / PLAN:  No diagnosis found.      1) Grave's Disease  - TFTs overall improving, concern about swing to hypothyroidism  - Ok to continue propranolol per PCP, discussed no longer necessary for thyroid   - Recheck TFTs now-- if stable from August will continue dose. If declining discussed doing 2.5mg daily (or 5mg every other day)  - Continue MMI 5mg daily for now  -Briefly reviewed treatment options for Grave's disease--- approximately 1/3 of people go into remission and may see this evidenced around 12-18 months. Approximately 2/3 of people require definitive therapy for thyroid which could be prolonged thioamide therapy, thyroidectomy, or radioactive iodine. Will discuss these further at future visits as indicated.      Orders Placed This Encounter   Procedures     T3 total     TSH     Thyroid stimulating immunoglobulin     T4 free         RTC 5 months    A total of 14 minutes were spent today 01/26/23 on this visit including chart review, history and counseling, documentation and other activities as detailed above.     Answers for  HPI/ROS submitted by the patient on 1/17/2023  General Symptoms: No  Skin Symptoms: No  HENT Symptoms: No  EYE SYMPTOMS: No  HEART SYMPTOMS: No  LUNG SYMPTOMS: No  INTESTINAL SYMPTOMS: No  URINARY SYMPTOMS: No  GYNECOLOGIC SYMPTOMS: No  BREAST SYMPTOMS: No  SKELETAL SYMPTOMS: No  BLOOD SYMPTOMS: No  NERVOUS SYSTEM SYMPTOMS: No  MENTAL HEALTH SYMPTOMS: No    Paty Shanks MD

## 2023-01-26 NOTE — PROGRESS NOTES
Video-Visit Details    Type of service:  Video Visit  Video Start Time: 9:03  Video End Time:9:13  Originating Location (pt. Location): [place], [place]  Distant Location (provider location):  Home  Platform used for Video Visit: Stefanie Shanks MD    Tram Bal is a 37 year old year old female here for evaluation of hyperthyroidism/Grave's disease via a billable video visit. She was previously seen by Dr Gilbert/Jessie on 6/7 as urgent THADDEUS visit, seen by me July 2022. She aslo has PMHx of generalized anxiety disorder and GERD    No chief complaint on file.      1) Grave's Disease  Diagnosis April 2022 experiencing palpations without any other hyperthyroid symptoms, found to have TSH < 0.01, free T4: 1.25, TT3: 192 with TSI: 1.4. She was initially started on propranolol 20mg TID which helped improved symptoms. Does note 1-2 months prior to symptom development had contrasted CT with evaluation of stomach (gastric bypass) She was started on methimazole 5mg at visit in June    Treatment Methimazole 5mg daily, propranolol 20mg (2 tab) daily    INTERVAL HISTORY:  Palpations and insomnia have improved, does notice that if misses propranolol will have heart fluttering.   PCP recommended continuing propranolol for anxiety symptoms  Notes mild fatigue, this has been consistent since time of diagnosis without much fluctuation.  Denies signs/symptoms of hypo/hyperthyroidism including hot/cold intolerance, diarrhea/constipation, major changes in hair skin or nails, tremor, palpations, or fatigue.  Denies any difficulty breathing, difficulty swallowing, shortness of breath, enlargement of neck/thyroid, family history of thyroid cancer, exposure to radiation, hoarseness or weight loss.                Active diagnoses this visit:     Low serum thyroid stimulating hormone (TSH)  Graves disease     ROS: 10 point ROS neg other than the symptoms noted above in the HPI.      Medical, surgical, social, and family  histories, medications and allergies reviewed and updated.  Past Medical History:   Diagnosis Date     Abnormal Pap smear of cervix 2004    , 2006, , ,      Cervical high risk HPV (human papillomavirus) test positive 2017,      Depressive disorder 2016     Displacement of lumbar intervertebral disc 05/15/2008     Esophageal reflux      Hx of colposcopy with cervical biopsy 2017:[place]- Benign.        Past Surgical History:   Procedure Laterality Date     COLPOSCOPY,LOOP ELECTRD CERVIX EXCIS  2005    JARETH III     GI SURGERY  10    LAP BANDING for obesity, pre-DM     ZZC NONSPECIFIC PROCEDURE      Oral surgery       Allergies:  Famvir [famciclovir]    Social History     Tobacco Use     Smoking status: Former     Packs/day: 0.00     Years: 3.00     Pack years: 0.00     Types: Cigarettes     Quit date: 2010     Years since quittin.7     Smokeless tobacco: Never     Tobacco comments:     quit smoking may 2010    Substance Use Topics     Alcohol use: Yes     Comment: occasionally       Family History   Problem Relation Age of Onset     Thyroid Disease Mother      Unknown/Adopted Mother      Hyperlipidemia Father      Hypertension Paternal Grandmother      Hyperlipidemia Paternal Grandmother      Diabetes Paternal Aunt      Hypertension Paternal Aunt      Lipids Paternal Aunt      Thyroid Disease Maternal Half-Brother      Unknown/Adopted No family hx of         unaware of maternal grandparents history as pt mom was adopted     Breast Cancer No family hx of      Cancer - colorectal No family hx of      Gynecology No family hx of         no ovarian cancer     Coronary Artery Disease No family hx of      Cerebrovascular Disease No family hx of      Thyroid Disease No family hx of      Osteoporosis No family hx of          Objective:  LMP 2023     Physical Exam (visual exam)  VS:  no vital signs taken for video visit  CONSTITUTIONAL: healthy, alert and  NAD, responding appropriately  ENT: normocephalic, no visual evidence of trauma, normal nose and oral mucosa  EYES: conjunctivae and sclerae normal, no exophthalmos or proptosis  THYROID:  no visualized nodules or goiter  LUNGS: no audible wheeze, cough or visible cyanosis, no visible retractions or increased work of breathing  EXTREMITIES: no hand tremors  NEUROLOGY: cranial nerves grossly intact with no obvious deficit.  SKIN:  no visualized skin lesions or rash, no edema visualized  PSYCH: mentation appears normal, normal judgement        Lab Results   Component Value Date/Time    TSH 3.88 11/06/2022 01:05 AM    TSH 1.20 10/26/2018 02:44 PM    T4 0.83 08/27/2022 12:06 PM    PTHI 21 01/14/2020 01:32 PM     Last Comprehensive Metabolic Panel:  Sodium   Date Value Ref Range Status   11/06/2022 139 133 - 144 mmol/L Final   10/26/2018 141 133 - 144 mmol/L Final     Potassium   Date Value Ref Range Status   11/06/2022 3.8 3.4 - 5.3 mmol/L Final   10/26/2018 4.1 3.4 - 5.3 mmol/L Final     Chloride   Date Value Ref Range Status   11/06/2022 108 94 - 109 mmol/L Final   10/26/2018 107 94 - 109 mmol/L Final     Carbon Dioxide   Date Value Ref Range Status   10/26/2018 24 20 - 32 mmol/L Final     Carbon Dioxide (CO2)   Date Value Ref Range Status   11/06/2022 22 20 - 32 mmol/L Final     Anion Gap   Date Value Ref Range Status   11/06/2022 9 3 - 14 mmol/L Final   10/26/2018 10 3 - 14 mmol/L Final     Glucose   Date Value Ref Range Status   11/06/2022 92 70 - 99 mg/dL Final   10/26/2018 86 70 - 99 mg/dL Final     Urea Nitrogen   Date Value Ref Range Status   11/06/2022 16 7 - 30 mg/dL Final   10/26/2018 10 7 - 30 mg/dL Final     Creatinine   Date Value Ref Range Status   11/06/2022 0.61 0.52 - 1.04 mg/dL Final   10/26/2018 0.68 0.52 - 1.04 mg/dL Final     GFR Estimate   Date Value Ref Range Status   11/06/2022 >90 >60 mL/min/1.73m2 Final     Comment:     Effective December 21, 2021 eGFRcr in adults is calculated using the 2021  CKD-EPI creatinine equation which includes age and gender (Torito will al., NEJ, DOI: 10.1056/ABZQmn8030643)   10/26/2018 >90 >60 mL/min/1.7m2 Final     Comment:     Non  GFR Calc     Calcium   Date Value Ref Range Status   11/06/2022 8.7 8.5 - 10.1 mg/dL Final   10/26/2018 8.4 (L) 8.5 - 10.1 mg/dL Final       ASSESSMENT / PLAN:  No diagnosis found.      1) Grave's Disease  - TFTs overall improving, concern about swing to hypothyroidism  - Ok to continue propranolol per PCP, discussed no longer necessary for thyroid   - Recheck TFTs now-- if stable from August will continue dose. If declining discussed doing 2.5mg daily (or 5mg every other day)  - Continue MMI 5mg daily for now  -Briefly reviewed treatment options for Grave's disease--- approximately 1/3 of people go into remission and may see this evidenced around 12-18 months. Approximately 2/3 of people require definitive therapy for thyroid which could be prolonged thioamide therapy, thyroidectomy, or radioactive iodine. Will discuss these further at future visits as indicated.      Orders Placed This Encounter   Procedures     T3 total     TSH     Thyroid stimulating immunoglobulin     T4 free         RTC 5 months    A total of 14 minutes were spent today 01/26/23 on this visit including chart review, history and counseling, documentation and other activities as detailed above.     Answers for HPI/ROS submitted by the patient on 1/17/2023  General Symptoms: No  Skin Symptoms: No  HENT Symptoms: No  EYE SYMPTOMS: No  HEART SYMPTOMS: No  LUNG SYMPTOMS: No  INTESTINAL SYMPTOMS: No  URINARY SYMPTOMS: No  GYNECOLOGIC SYMPTOMS: No  BREAST SYMPTOMS: No  SKELETAL SYMPTOMS: No  BLOOD SYMPTOMS: No  NERVOUS SYSTEM SYMPTOMS: No  MENTAL HEALTH SYMPTOMS: No

## 2023-01-26 NOTE — PROGRESS NOTES
Tram Bal  is being evaluated via a billable video visit.      How would you like to obtain your AVS? [employer]  For the video visit, send the invitation by: Text to cell phone: [phone]  Will anyone else be joining your video visit? No

## 2023-02-09 ENCOUNTER — LAB (OUTPATIENT)
Dept: LAB | Facility: CLINIC | Age: 38
End: 2023-02-09
Payer: COMMERCIAL

## 2023-02-09 DIAGNOSIS — E05.00 GRAVES DISEASE: ICD-10-CM

## 2023-02-09 LAB
T3 SERPL-MCNC: 143 NG/DL (ref 85–202)
T4 FREE SERPL-MCNC: 0.92 NG/DL (ref 0.9–1.7)
TSH SERPL DL<=0.005 MIU/L-ACNC: 1.89 UIU/ML (ref 0.3–4.2)

## 2023-02-09 PROCEDURE — 36415 COLL VENOUS BLD VENIPUNCTURE: CPT

## 2023-02-09 PROCEDURE — 84439 ASSAY OF FREE THYROXINE: CPT

## 2023-02-09 PROCEDURE — 84443 ASSAY THYROID STIM HORMONE: CPT

## 2023-02-09 PROCEDURE — 99000 SPECIMEN HANDLING OFFICE-LAB: CPT

## 2023-02-09 PROCEDURE — 84480 ASSAY TRIIODOTHYRONINE (T3): CPT

## 2023-02-09 PROCEDURE — 84445 ASSAY OF TSI GLOBULIN: CPT | Mod: 90

## 2023-02-14 LAB — TSI SER-ACNC: 1.2 TSI INDEX

## 2023-02-15 ENCOUNTER — OFFICE VISIT (OUTPATIENT)
Dept: SURGERY | Facility: CLINIC | Age: 38
End: 2023-02-15
Payer: COMMERCIAL

## 2023-02-15 VITALS
DIASTOLIC BLOOD PRESSURE: 84 MMHG | SYSTOLIC BLOOD PRESSURE: 126 MMHG | BODY MASS INDEX: 31.41 KG/M2 | WEIGHT: 184 LBS | TEMPERATURE: 98.2 F | HEIGHT: 64 IN

## 2023-02-15 DIAGNOSIS — L72.9 SCALP CYST: Primary | ICD-10-CM

## 2023-02-15 PROCEDURE — 11422 EXC H-F-NK-SP B9+MARG 1.1-2: CPT | Performed by: SURGERY

## 2023-02-15 PROCEDURE — 99202 OFFICE O/P NEW SF 15 MIN: CPT | Mod: 25 | Performed by: SURGERY

## 2023-02-15 ASSESSMENT — PAIN SCALES - GENERAL: PAINLEVEL: NO PAIN (0)

## 2023-02-15 NOTE — PROGRESS NOTES
.  Patient seen in consultation for scalp cyst by Trudi Guevara    HPI:  Patient is a 37 year old female with history of other scalp cysts here to discuss another 1.  She has had them removed in the past without issue.  She does not take blood thinning medication.  This has been present for a while and has balding and constant dull ache.    Review Of Systems    Skin: as above  Ears/Nose/Throat: negative  Respiratory: No shortness of breath, dyspnea on exertion, cough, or hemoptysis  Cardiovascular: negative  Gastrointestinal: negative  Genitourinary: negative  Musculoskeletal: negative  Neurologic: negative  Hematologic/Lymphatic/Immunologic: negative  Endocrine: negative      Past Medical History:   Diagnosis Date     Abnormal Pap smear of cervix 2004    2005, 2006, 2010, 2016, 2017     Cervical high risk HPV (human papillomavirus) test positive 2017 2018, 2020     Depressive disorder 2016     Displacement of lumbar intervertebral disc 05/15/2008     Esophageal reflux      Hx of colposcopy with cervical biopsy 05/22/2017 5/22/17:[place]- Benign.        Past Surgical History:   Procedure Laterality Date     COLPOSCOPY,LOOP ELECTRD CERVIX EXCIS  08/11/2005    JARETH III     GI SURGERY  04/14/11    LAP BANDING for obesity, pre-DM     ZZC NONSPECIFIC PROCEDURE  1999    Oral surgery       Family History   Problem Relation Age of Onset     Thyroid Disease Mother      Unknown/Adopted Mother      Hyperlipidemia Father      Hypertension Paternal Grandmother      Hyperlipidemia Paternal Grandmother      Diabetes Paternal Aunt      Hypertension Paternal Aunt      Lipids Paternal Aunt      Thyroid Disease Maternal Half-Brother      Unknown/Adopted No family hx of         unaware of maternal grandparents history as pt mom was adopted     Breast Cancer No family hx of      Cancer - colorectal No family hx of      Gynecology No family hx of         no ovarian cancer     Coronary Artery Disease No family hx of       Cerebrovascular Disease No family hx of      Thyroid Disease No family hx of      Osteoporosis No family hx of        Social History     Socioeconomic History     Marital status: Single     Spouse name: Not on file     Number of children: 2     Years of education: Not on file     Highest education level: Not on file   Occupational History     Employer: [employer]   Tobacco Use     Smoking status: Former     Packs/day: 0.00     Years: 3.00     Pack years: 0.00     Types: Cigarettes     Quit date: 2010     Years since quittin.8     Smokeless tobacco: Never     Tobacco comments:     quit smoking may 2010    Vaping Use     Vaping Use: Never used   Substance and Sexual Activity     Alcohol use: Yes     Comment: occasionally     Drug use: No     Sexual activity: Yes     Partners: Male     Birth control/protection: Condom, Inserts/Ring     Comment: Nuva ring   Other Topics Concern     Parent/sibling w/ CABG, MI or angioplasty before 65F 55M? No   Social History Narrative    Single with 2 kids ages 7 and 4.  Works as a vet tech     Social Determinants of Health     Financial Resource Strain: Not on file   Food Insecurity: Not on file   Transportation Needs: Not on file   Physical Activity: Not on file   Stress: Not on file   Social Connections: Not on file   Intimate Partner Violence: Not on file   Housing Stability: Not on file       Current Outpatient Medications   Medication Sig Dispense Refill     acyclovir (ZOVIRAX) 800 MG tablet Take 1 tablet (800 mg) by mouth 3 times daily 21 tablet 2     cyclobenzaprine (FLEXERIL) 10 MG tablet Take 0.5-1 tablets (5-10 mg) by mouth 3 times daily as needed for muscle spasms 20 tablet 1     ELURYNG 0.12-0.015 MG/24HR vaginal ring INSERT ONE RING VAGINALLY EVERY 21 DAYS, THEN REMOVE FOR ONE WEEK, THEN REPEAT WITH NEW RING 3 each 1     FLUoxetine (PROZAC) 40 MG capsule Take 1 capsule (40 mg) by mouth daily 30 capsule 3     hydrOXYzine (ATARAX) 25 MG tablet Take  "0.5-1 tablets (12.5-25 mg) by mouth 3 times daily as needed for anxiety 30 tablet 1     methimazole (TAPAZOLE) 5 MG tablet Take 1 tablet (5 mg) by mouth daily 90 tablet 3     Multiple Vitamins-Iron TABS Take by mouth daily        propranolol (INDERAL) 10 MG tablet TAKE 1 TABLET(10 MG) BY MOUTH TWICE DAILY 60 tablet 3       Medications and history reviewed    Physical exam:  Vitals: /84 (BP Location: Left arm, Patient Position: Sitting, Cuff Size: Adult Regular)   Temp 98.2  F (36.8  C) (Temporal)   Ht 1.626 m (5' 4\")   Wt 83.5 kg (184 lb)   LMP 01/06/2023 (Exact Date)   Breastfeeding No   BMI 31.58 kg/m    BMI= Body mass index is 31.58 kg/m .    Constitutional: Healthy, alert, non-distressed   Head: 1.5 cm scalp cyst with smooth balding skin.  No erythema  Cardiovascular: RRR, no new murmurs, +S1, +S2 heart sounds, no clicks, rubs or gallops   Respiratory: CTAB, no rales, rhonchi or wheezing, equal chest rise, good respiratory effort   Gastrointestinal: Soft, non-tender, non distended, no rebound rigidity or guarding, no masses or hernias palpated   : Deferred  Musculoskeletal: Moves all extremities, normal  strength, no deformities noted   Skin: No suspicious lesions or rashes   Psychiatric: Mentation appears normal, affect appropriate   Hematologic/Lymphatic/Immunologic: Normal cervical and supraclavicular lymph nodes   Patient able to get up on table without difficulty.    Labs show:  No results found for this or any previous visit (from the past 24 hour(s)).    Imaging shows:  No results found for this or any previous visit (from the past 744 hour(s)).     Assessment:     ICD-10-CM    1. Scalp cyst  L72.9         Plan: I offered in office excision of her enlarging and symptomatic scalp cyst.  Rand the procedure and the aftercare and after our discussion agreed to proceed.    PROCEDURE: Excision scalp cyst   Written consent was obtained    The scalp area was prepped and appropriately " anesthetized with 1% lidocaine with epinephrine. Using the usual technique, excision subcutaneous cyst was performed. The total area excised, including lesion and margins was 1.5 x 1.0 x 1.0 cm.  Closure was accomplished with interrupted 3-0 vicryl for the dermal layer and  subcuticular 5-0 monocryl for the skin. Total length of the incision after closure was 1.5 cm. An appropriate  dressing was applied.  The procedure was well tolerated and without complications. Specimen was not sent to Pathology.        40 minutes spent on the date of the encounter doing chart review, history and exam, documentation and further activities per the note; 20 of which was the procedure itself.    Carter Francisco, DO

## 2023-02-15 NOTE — LETTER
2/15/2023         RE: Tram Bal  [address] [facility] [address]        Dear Colleague,    Thank you for referring your patient, Tram Bal, to the [facility]. Please see a copy of my visit note below.    .  Patient seen in consultation for scalp cyst by Trudi Guevara    HPI:  Patient is a 37 year old female with history of other scalp cysts here to discuss another 1.  She has had them removed in the past without issue.  She does not take blood thinning medication.  This has been present for a while and has balding and constant dull ache.    Review Of Systems    Skin: as above  Ears/Nose/Throat: negative  Respiratory: No shortness of breath, dyspnea on exertion, cough, or hemoptysis  Cardiovascular: negative  Gastrointestinal: negative  Genitourinary: negative  Musculoskeletal: negative  Neurologic: negative  Hematologic/Lymphatic/Immunologic: negative  Endocrine: negative      Past Medical History:   Diagnosis Date     Abnormal Pap smear of cervix 2004    2005, 2006, 2010, 2016, 2017     Cervical high risk HPV (human papillomavirus) test positive 2017 2018, 2020     Depressive disorder 2016     Displacement of lumbar intervertebral disc 05/15/2008     Esophageal reflux      Hx of colposcopy with cervical biopsy 05/22/2017 5/22/17:[place]- Benign.        Past Surgical History:   Procedure Laterality Date     COLPOSCOPY,LOOP ELECTRD CERVIX EXCIS  08/11/2005    JARETH III     GI SURGERY  04/14/11    LAP BANDING for obesity, pre-DM     ZZC NONSPECIFIC PROCEDURE  1999    Oral surgery       Family History   Problem Relation Age of Onset     Thyroid Disease Mother      Unknown/Adopted Mother      Hyperlipidemia Father      Hypertension Paternal Grandmother      Hyperlipidemia Paternal Grandmother      Diabetes Paternal Aunt      Hypertension Paternal Aunt      Lipids Paternal Aunt      Thyroid Disease Maternal Half-Brother      Unknown/Adopted No family hx of         unaware of  maternal grandparents history as pt mom was adopted     Breast Cancer No family hx of      Cancer - colorectal No family hx of      Gynecology No family hx of         no ovarian cancer     Coronary Artery Disease No family hx of      Cerebrovascular Disease No family hx of      Thyroid Disease No family hx of      Osteoporosis No family hx of        Social History     Socioeconomic History     Marital status: Single     Spouse name: Not on file     Number of children: 2     Years of education: Not on file     Highest education level: Not on file   Occupational History     Employer: [employer]   Tobacco Use     Smoking status: Former     Packs/day: 0.00     Years: 3.00     Pack years: 0.00     Types: Cigarettes     Quit date: 2010     Years since quittin.8     Smokeless tobacco: Never     Tobacco comments:     quit smoking may 2010    Vaping Use     Vaping Use: Never used   Substance and Sexual Activity     Alcohol use: Yes     Comment: occasionally     Drug use: No     Sexual activity: Yes     Partners: Male     Birth control/protection: Condom, Inserts/Ring     Comment: Nuva ring   Other Topics Concern     Parent/sibling w/ CABG, MI or angioplasty before 65F 55M? No   Social History Narrative    Single with 2 kids ages 7 and 4.  Works as a vet tech     Social Determinants of Health     Financial Resource Strain: Not on file   Food Insecurity: Not on file   Transportation Needs: Not on file   Physical Activity: Not on file   Stress: Not on file   Social Connections: Not on file   Intimate Partner Violence: Not on file   Housing Stability: Not on file       Current Outpatient Medications   Medication Sig Dispense Refill     acyclovir (ZOVIRAX) 800 MG tablet Take 1 tablet (800 mg) by mouth 3 times daily 21 tablet 2     cyclobenzaprine (FLEXERIL) 10 MG tablet Take 0.5-1 tablets (5-10 mg) by mouth 3 times daily as needed for muscle spasms 20 tablet 1     ELURYNG 0.12-0.015 MG/24HR vaginal ring  "INSERT ONE RING VAGINALLY EVERY 21 DAYS, THEN REMOVE FOR ONE WEEK, THEN REPEAT WITH NEW RING 3 each 1     FLUoxetine (PROZAC) 40 MG capsule Take 1 capsule (40 mg) by mouth daily 30 capsule 3     hydrOXYzine (ATARAX) 25 MG tablet Take 0.5-1 tablets (12.5-25 mg) by mouth 3 times daily as needed for anxiety 30 tablet 1     methimazole (TAPAZOLE) 5 MG tablet Take 1 tablet (5 mg) by mouth daily 90 tablet 3     Multiple Vitamins-Iron TABS Take by mouth daily        propranolol (INDERAL) 10 MG tablet TAKE 1 TABLET(10 MG) BY MOUTH TWICE DAILY 60 tablet 3       Medications and history reviewed    Physical exam:  Vitals: /84 (BP Location: Left arm, Patient Position: Sitting, Cuff Size: Adult Regular)   Temp 98.2  F (36.8  C) (Temporal)   Ht 1.626 m (5' 4\")   Wt 83.5 kg (184 lb)   LMP 01/06/2023 (Exact Date)   Breastfeeding No   BMI 31.58 kg/m    BMI= Body mass index is 31.58 kg/m .    Constitutional: Healthy, alert, non-distressed   Head: 1.5 cm scalp cyst with smooth balding skin.  No erythema  Cardiovascular: RRR, no new murmurs, +S1, +S2 heart sounds, no clicks, rubs or gallops   Respiratory: CTAB, no rales, rhonchi or wheezing, equal chest rise, good respiratory effort   Gastrointestinal: Soft, non-tender, non distended, no rebound rigidity or guarding, no masses or hernias palpated   : Deferred  Musculoskeletal: Moves all extremities, normal  strength, no deformities noted   Skin: No suspicious lesions or rashes   Psychiatric: Mentation appears normal, affect appropriate   Hematologic/Lymphatic/Immunologic: Normal cervical and supraclavicular lymph nodes   Patient able to get up on table without difficulty.    Labs show:  No results found for this or any previous visit (from the past 24 hour(s)).    Imaging shows:  No results found for this or any previous visit (from the past 744 hour(s)).     Assessment:     ICD-10-CM    1. Scalp cyst  L72.9         Plan: I offered in office excision of her enlarging " and symptomatic scalp cyst.  Rand the procedure and the aftercare and after our discussion agreed to proceed.    PROCEDURE: Excision scalp cyst   Written consent was obtained    The scalp area was prepped and appropriately anesthetized with 1% lidocaine with epinephrine. Using the usual technique, excision subcutaneous cyst was performed. The total area excised, including lesion and margins was 1.5 x 1.0 x 1.0 cm.  Closure was accomplished with interrupted 3-0 vicryl for the dermal layer and  subcuticular 5-0 monocryl for the skin. Total length of the incision after closure was 1.5 cm. An appropriate  dressing was applied.  The procedure was well tolerated and without complications. Specimen was not sent to Pathology.        40 minutes spent on the date of the encounter doing chart review, history and exam, documentation and further activities per the note; 20 of which was the procedure itself.    Crater Francisco DO        Again, thank you for allowing me to participate in the care of your patient.        Sincerely,        Carter Francisco DO

## 2023-03-09 DIAGNOSIS — F41.1 GENERALIZED ANXIETY DISORDER: ICD-10-CM

## 2023-03-09 DIAGNOSIS — F32.0 MILD MAJOR DEPRESSION (H): ICD-10-CM

## 2023-03-09 RX ORDER — FLUOXETINE 40 MG/1
CAPSULE ORAL
Qty: 30 CAPSULE | Refills: 0 | Status: SHIPPED | OUTPATIENT
Start: 2023-03-09 | End: 2023-03-21

## 2023-03-21 ENCOUNTER — OFFICE VISIT (OUTPATIENT)
Dept: FAMILY MEDICINE | Facility: CLINIC | Age: 38
End: 2023-03-21
Payer: COMMERCIAL

## 2023-03-21 VITALS
WEIGHT: 185 LBS | DIASTOLIC BLOOD PRESSURE: 72 MMHG | TEMPERATURE: 98.4 F | OXYGEN SATURATION: 96 % | BODY MASS INDEX: 31.58 KG/M2 | SYSTOLIC BLOOD PRESSURE: 126 MMHG | HEART RATE: 74 BPM | RESPIRATION RATE: 16 BRPM | HEIGHT: 64 IN

## 2023-03-21 DIAGNOSIS — F41.1 GENERALIZED ANXIETY DISORDER: ICD-10-CM

## 2023-03-21 DIAGNOSIS — F32.0 MILD MAJOR DEPRESSION (H): ICD-10-CM

## 2023-03-21 DIAGNOSIS — Z98.84 BARIATRIC SURGERY STATUS: ICD-10-CM

## 2023-03-21 DIAGNOSIS — Z01.818 PREOP GENERAL PHYSICAL EXAM: Primary | ICD-10-CM

## 2023-03-21 DIAGNOSIS — E05.00 GRAVES DISEASE: ICD-10-CM

## 2023-03-21 PROBLEM — E66.3 OVERWEIGHT (BMI 25.0-29.9): Status: RESOLVED | Noted: 2017-01-10 | Resolved: 2023-03-21

## 2023-03-21 PROCEDURE — 99214 OFFICE O/P EST MOD 30 MIN: CPT | Performed by: NURSE PRACTITIONER

## 2023-03-21 RX ORDER — FLUOXETINE 40 MG/1
40 CAPSULE ORAL DAILY
Qty: 90 CAPSULE | Refills: 3 | Status: SHIPPED | OUTPATIENT
Start: 2023-03-21 | End: 2024-01-16

## 2023-03-21 ASSESSMENT — PATIENT HEALTH QUESTIONNAIRE - PHQ9
10. IF YOU CHECKED OFF ANY PROBLEMS, HOW DIFFICULT HAVE THESE PROBLEMS MADE IT FOR YOU TO DO YOUR WORK, TAKE CARE OF THINGS AT HOME, OR GET ALONG WITH OTHER PEOPLE: SOMEWHAT DIFFICULT
SUM OF ALL RESPONSES TO PHQ QUESTIONS 1-9: 7
SUM OF ALL RESPONSES TO PHQ QUESTIONS 1-9: 7

## 2023-03-21 ASSESSMENT — PAIN SCALES - GENERAL: PAINLEVEL: NO PAIN (0)

## 2023-03-21 NOTE — PATIENT INSTRUCTIONS
Take methimazole and propranolol the morning of surgery  For informational purposes only. Not to replace the advice of your health care provider. Copyright   ,  [employer] [facility]. All rights reserved. Clinically reviewed by Yessy Underwood MD. [employer] [id number] - REV .  Preparing for Your Surgery  Getting started  A nurse will call you to review your health history and instructions. They will give you an arrival time based on your scheduled surgery time. Please be ready to share:  Your doctor's clinic name and phone number  Your medical, surgical, and anesthesia history  A list of allergies and sensitivities  A list of medicines, including herbal treatments and over-the-counter drugs  Whether the patient has a legal guardian (ask how to send us the papers in advance)  Please tell us if you're pregnant--or if there's any chance you might be pregnant. Some surgeries may injure a fetus (unborn baby), so they require a pregnancy test. Surgeries that are safe for a fetus don't always need a test, and you can choose whether to have one.   If you have a child who's having surgery, please ask for a copy of Preparing for Your Child's Surgery.    Preparing for surgery  Within 10 to 30 days of surgery: Have a pre-op exam (sometimes called an H&P, or History and Physical). This can be done at a clinic or pre-operative center.  If you're having a , you may not need this exam. Talk to your care team.  At your pre-op exam, talk to your care team about all medicines you take. If you need to stop any medicines before surgery, ask when to start taking them again.  We do this for your safety. Many medicines can make you bleed too much during surgery. Some change how well surgery (anesthesia) drugs work.  Call your insurance company to let them know you're having surgery. (If you don't have insurance, call [phone].)  Call your clinic if there's any change in your health. This includes signs of a cold or  flu (sore throat, runny nose, cough, rash, fever). It also includes a scrape or scratch near the surgery site.  If you have questions on the day of surgery, call your hospital or surgery center.  Eating and drinking guidelines  For your safety: Unless your surgeon tells you otherwise, follow the guidelines below.  Eat and drink as usual until 8 hours before you arrive for surgery. After that, no food or milk.  Drink clear liquids until 2 hours before you arrive. These are liquids you can see through, like water, Gatorade, and Propel Water. They also include plain black coffee and tea (no cream or milk), candy, and breath mints. You can spit out gum when you arrive.  If you drink alcohol: Stop drinking it the night before surgery.  If your care team tells you to take medicine on the morning of surgery, it's okay to take it with a sip of water.  Preventing infection  Shower or bathe the night before and morning of your surgery. Follow the instructions your clinic gave you. (If no instructions, use regular soap.)  Don't shave or clip hair near your surgery site. We'll remove the hair if needed.  Don't smoke or vape the morning of surgery. You may chew nicotine gum up to 2 hours before surgery. A nicotine patch is okay.  Note: Some surgeries require you to completely quit smoking and nicotine. Check with your surgeon.  Your care team will make every effort to keep you safe from infection. We will:  Clean our hands often with soap and water (or an alcohol-based hand rub).  Clean the skin at your surgery site with a special soap that kills germs.  Give you a special gown to keep you warm. (Cold raises the risk of infection.)  Wear special hair covers, masks, gowns and gloves during surgery.  Give antibiotic medicine, if prescribed. Not all surgeries need antibiotics.  What to bring on the day of surgery  Photo ID and insurance card  Copy of your health care directive, if you have one  Glasses and hearing aids (bring  cases)  You can't wear contacts during surgery  Inhaler and eye drops, if you use them (tell us about these when you arrive)  CPAP machine or breathing device, if you use them  A few personal items, if spending the night  If you have . . .  A pacemaker, ICD (cardiac defibrillator) or other implant: Bring the ID card.  An implanted stimulator: Bring the remote control.  A legal guardian: Bring a copy of the certified (court-stamped) guardianship papers.  Please remove any jewelry, including body piercings. Leave jewelry and other valuables at home.  If you're going home the day of surgery  You must have a responsible adult drive you home. They should stay with you overnight as well.  If you don't have someone to stay with you, and you aren't safe to go home alone, we may keep you overnight. Insurance often won't pay for this.  After surgery  If it's hard to control your pain or you need more pain medicine, please call your surgeon's office.  Questions?   If you have any questions for your care team, list them here: _________________________________________________________________________________________________________________________________________________________________________ ____________________________________ ____________________________________ ____________________________________

## 2023-03-21 NOTE — PROGRESS NOTES
[facility]  [address] [place] [address]  Phone: [phone]  Fax: [phone]  Primary Provider: Tee Guevara  Pre-op Performing Provider: TEE GUEVARA    PREOPERATIVE EVALUATION:  Today's date: 3/21/2023    Tram Bal is a 37 year old female who presents for a preoperative evaluation.    Surgical Information:  Surgery/Procedure: Full Abdominoplasty, Augmentation mammoplasty, lipectomy suction upper extremity    Surgery Location: [facility]   Surgeon: Dr. James Wilkerson  Surgery Date: 4/10/2023  Time of Surgery: 700  Where patient plans to recover: At home with family  Fax number for surgical facility: [phone]    Type of Anesthesia Anticipated: to be determined    Assessment & Plan     The proposed surgical procedure is considered INTERMEDIATE risk.    Preop general physical exam    Bariatric surgery status    Graves disease    Mild major depression (H)    - FLUoxetine (PROZAC) 40 MG capsule; Take 1 capsule (40 mg) by mouth daily    Generalized anxiety disorder    - FLUoxetine (PROZAC) 40 MG capsule; Take 1 capsule (40 mg) by mouth daily      Risks and Recommendations:  The patient has the following additional risks and recommendations for perioperative complications:   - No identified additional risk factors other than previously addressed    Medication Instructions:  Patient is to take all scheduled medications on the day of surgery EXCEPT for modifications listed below:   - Beta Blockers: Continue taking on the day of surgery.   - SSRIs, SNRIs, TCAs, Antipsychotics: Continue without modification.     RECOMMENDATION:  APPROVAL GIVEN to proceed with proposed procedure, without further diagnostic evaluation.    Subjective     HPI related to upcoming procedure: Cosmetic    Preop Questions 3/20/2023   1. Have you ever had a heart attack or stroke? No   2. Have you ever had surgery on your heart or blood vessels, such as a stent  placement, a coronary artery bypass, or surgery on an artery in your head, neck, heart, or legs? No   3. Do you have chest pain with activity? No   4. Do you have a history of  heart failure? No   5. Do you currently have a cold, bronchitis or symptoms of other infection? No   6. Do you have a cough, shortness of breath, or wheezing? No   7. Do you or anyone in your family have previous history of blood clots? No   8. Do you or does anyone in your family have a serious bleeding problem such as prolonged bleeding following surgeries or cuts? No   9. Have you ever had problems with anemia or been told to take iron pills? No   10. Have you had any abnormal blood loss such as black, tarry or bloody stools, or abnormal vaginal bleeding? No   11. Have you ever had a blood transfusion? No   12. Are you willing to have a blood transfusion if it is medically needed before, during, or after your surgery? Yes   13. Have you or any of your relatives ever had problems with anesthesia? No   14. Do you have sleep apnea, excessive snoring or daytime drowsiness? No   15. Do you have any artifical heart valves or other implanted medical devices like a pacemaker, defibrillator, or continuous glucose monitor? No   16. Do you have artificial joints? No   17. Are you allergic to latex? No   18. Is there any chance that you may be pregnant? No       Health Care Directive:  Patient does not have a Health Care Directive or Living Will: Discussed advance care planning with patient; information given to patient to review.    Preoperative Review of :   reviewed - no record of controlled substances prescribed.    Status of Chronic Conditions:  See problem list for active medical problems.  Problems all longstanding and stable, except as noted/documented.  See ROS for pertinent symptoms related to these conditions.      Review of Systems  CONSTITUTIONAL: NEGATIVE for fever, chills, change in weight  INTEGUMENTARY/SKIN: NEGATIVE for  worrisome rashes, moles or lesions  EYES: NEGATIVE for vision changes or irritation  ENT/MOUTH: NEGATIVE for ear, mouth and throat problems  RESP: NEGATIVE for significant cough or SOB  CV: NEGATIVE for chest pain, palpitations or peripheral edema  GI: NEGATIVE for nausea, abdominal pain, heartburn, or change in bowel habits  : NEGATIVE for frequency, dysuria, or hematuria  MUSCULOSKELETAL: NEGATIVE for significant arthralgias or myalgia  NEURO: NEGATIVE for weakness, dizziness or paresthesias  ENDOCRINE: NEGATIVE for temperature intolerance, skin/hair changes  HEME: NEGATIVE for bleeding problems  PSYCHIATRIC: NEGATIVE for changes in mood or affect    Patient Active Problem List    Diagnosis Date Noted     Iron deficiency 01/16/2020     Priority: Medium     Vitamin D deficiency 10/28/2018     Priority: Medium     Mild major depression (H)      Priority: Medium     Class 1 obesity due to excess calories without serious comorbidity with body mass index (BMI) of 32.0 to 32.9 in adult 08/07/2018     Priority: Medium     Bariatric surgery status 01/10/2017     Priority: Medium     Generalized anxiety disorder 03/29/2016     Priority: Medium     Tobacco abuse, in remission 07/13/2011     Priority: Medium     LAP-BAND surgery status 05/16/2011     Priority: Medium     GERD without esophagitis 04/19/2010     Priority: Medium     Displacement of lumbar intervertebral disc 05/15/2008     Priority: Medium     JARETH III (cervical intraepithelial neoplasia III) 08/22/2005     Priority: Medium     7/04 LSIL   10/04 [place] JARETH I  4/05 LSIL  6/05 [place] JARETH II & III  8/05 LEEP JARETH III  12/05 NIL pap  4/06 ASCUS, neg HPV  10/06, 11/07, 12/08 NIL paps  1/10 ASCUS, neg HPV  2011, 2012, 2013 NIL paps  3/31/14 NIL/neg HPV. Plan cotest in 1 year.  4/20/15 NIL/neg HPV. Plan: cotest in 3 years.  4/25/16 ASCUS, Neg HPV. Cotest in 1 year.   4/28/17 ASCUS, +High Risk HPV (Neg 16/18).  Plan [place]  5/22/17 [place] Benign. ASCUS pap, +HR HPV (Not types  16/18). Plan cotest in 1 year.   10/26/18 NIL Pap, + HR HPV (Neg 16/18). Plan col[facility]  4/1/19 [place] [facility] - atypia, favor reactive change.  NIL pap, + HR HPV (not 16 or 18). Plan: cotest in 1 yr   11/23/20 NIL Pap, + HR HPV (neg 16/18). [place] due by 2/23/2021 6/29/21 [place] [facility] - no JARETH. Plan: cotest in 1 year  8/8/22 Lost to follow-up for pap tracking   10/10/22 NIL pap, + HR HPV (not 16 or 18) colp by 1/10/23  1/6/23 [place] [facility] neg. NIL pap, +HR HPV (not 16/18). Plan: cotest in 1 year         Past Medical History:   Diagnosis Date     Abnormal Pap smear of cervix 2004 2005, 2006, 2010, 2016, 2017     Cervical high risk HPV (human papillomavirus) test positive 2017 2018, 2020     Depressive disorder 2016     Displacement of lumbar intervertebral disc 05/15/2008     Esophageal reflux      Hx of colposcopy with cervical biopsy 05/22/2017 5/22/17:[place]- Benign.      Thyroid disease 2022     Past Surgical History:   Procedure Laterality Date     COLPOSCOPY,LOOP ELECTRD CERVIX EXCIS  08/11/2005    JARETH III     GI SURGERY  04/14/11    LAP BANDING for obesity, pre-DM     ZZC NONSPECIFIC PROCEDURE  1999    Oral surgery     Current Outpatient Medications   Medication Sig Dispense Refill     cyclobenzaprine (FLEXERIL) 10 MG tablet Take 0.5-1 tablets (5-10 mg) by mouth 3 times daily as needed for muscle spasms 20 tablet 1     ELURYNG 0.12-0.015 MG/24HR vaginal ring INSERT ONE RING VAGINALLY EVERY 21 DAYS, THEN REMOVE FOR ONE WEEK, THEN REPEAT WITH NEW RING 3 each 1     FLUoxetine (PROZAC) 40 MG capsule Take 1 capsule (40 mg) by mouth daily 90 capsule 3     hydrOXYzine (ATARAX) 25 MG tablet Take 0.5-1 tablets (12.5-25 mg) by mouth 3 times daily as needed for anxiety 30 tablet 1     methimazole (TAPAZOLE) 5 MG tablet Take 1 tablet (5 mg) by mouth daily 90 tablet 3     Multiple Vitamins-Iron TABS Take by mouth daily        propranolol (INDERAL) 10 MG tablet TAKE 1 TABLET(10 MG) BY MOUTH TWICE DAILY 60 tablet 3     acyclovir  "(ZOVIRAX) 800 MG tablet Take 1 tablet (800 mg) by mouth 3 times daily (Patient not taking: Reported on 3/21/2023) 21 tablet 2       Allergies   Allergen Reactions     Famvir [Famciclovir] Hives        Social History     Tobacco Use     Smoking status: Former     Packs/day: 0.00     Years: 3.00     Pack years: 0.00     Types: Cigarettes     Quit date: 2010     Years since quittin.8     Smokeless tobacco: Never     Tobacco comments:     quit smoking may 2010    Substance Use Topics     Alcohol use: Yes     Comment: occasionally     History   Drug Use No         Objective     /72 (Cuff Size: Adult Regular)   Pulse 74   Temp 98.4  F (36.9  C) (Tympanic)   Resp 16   Ht 1.626 m (5' 4\")   Wt 83.9 kg (185 lb)   LMP 2023 (Approximate)   SpO2 96%   BMI 31.76 kg/m      Physical Exam    GENERAL APPEARANCE: healthy, alert and no distress     EYES: EOMI, PERRL     HENT: ear canals and TM's normal and nose and mouth without ulcers or lesions     NECK: no adenopathy, no asymmetry, masses, or scars and thyroid normal to palpation     RESP: lungs clear to auscultation - no rales, rhonchi or wheezes     CV: regular rates and rhythm, normal S1 S2, no S3 or S4 and no murmur, click or rub     ABDOMEN:  soft, nontender, no HSM or masses and bowel sounds normal     MS: extremities normal- no gross deformities noted, no evidence of inflammation in joints, FROM in all extremities.     SKIN: no suspicious lesions or rashes     NEURO: Normal strength and tone, sensory exam grossly normal, mentation intact and speech normal     PSYCH: mentation appears normal. and affect normal/bright     LYMPHATICS: No cervical adenopathy    Recent Labs   Lab Test 22  0105 22  1206   HGB 13.0 12.4   * 413    140   POTASSIUM 3.8 4.0   CR 0.61 0.65        Diagnostics:  No labs were ordered during this visit.   No EKG required, no history of coronary heart disease, significant arrhythmia, peripheral arterial " disease or other structural heart disease.    Revised Cardiac Risk Index (RCRI):  The patient has the following serious cardiovascular risks for perioperative complications:   - No serious cardiac risks = 0 points     RCRI Interpretation: 0 points: Class I (very low risk - 0.4% complication rate)           Signed Electronically by: DEON Torres CNP  Copy of this evaluation report is provided to requesting physician.

## 2023-03-27 ENCOUNTER — E-VISIT (OUTPATIENT)
Dept: URGENT CARE | Facility: CLINIC | Age: 38
End: 2023-03-27
Payer: COMMERCIAL

## 2023-03-27 DIAGNOSIS — H10.502 BLEPHAROCONJUNCTIVITIS OF LEFT EYE, UNSPECIFIED BLEPHAROCONJUNCTIVITIS TYPE: Primary | ICD-10-CM

## 2023-03-27 PROCEDURE — 99421 OL DIG E/M SVC 5-10 MIN: CPT | Performed by: PREVENTIVE MEDICINE

## 2023-03-27 RX ORDER — ERYTHROMYCIN 5 MG/G
0.5 OINTMENT OPHTHALMIC 3 TIMES DAILY
Qty: 10.5 G | Refills: 0 | Status: SHIPPED | OUTPATIENT
Start: 2023-03-27 | End: 2023-04-03

## 2023-03-27 NOTE — PATIENT INSTRUCTIONS
Thank you for choosing us for your care. I have placed an order for a prescription so that you can start treatment. View your full visit summary for details by clicking on the link below. Your pharmacist will able to address any questions you may have about the medication.     If you re not feeling better within 2-3 days, please schedule an appointment.  You can schedule an appointment right here in [facility], or call [phone]  If the visit is for the same symptoms as your eVisit, we ll refund the cost of your eVisit if seen within seven days.      Conjunctivitis, Nonspecific    The membrane that covers the white part of your eye (the conjunctiva) is inflamed. Inflammation happens when your body responds to an injury, allergic reaction, infection, or illness. Symptoms of inflammation in the eye may include redness, irritation, itching, swelling, or burning. These symptoms should go away within the next 24 hours. Conjunctivitis may be related to a particle that was in your eye. If so, it may wash out with your tears or irrigation treatment. Being exposed to liquid chemicals or fumes may also cause this reaction.    Home care    Put a cold pack on the eye for 20 minutes at a time. This will reduce pain. To make a cold pack, put ice cubes in a plastic bag that seals at the top. Wrap the bag in a clean, thin towel or cloth.    Artificial tears may be prescribed to reduce irritation or redness. These should be used 3 to 4 times a day.    You may use acetaminophen or ibuprofen to control pain, unless another medicine was prescribed. If you have chronic liver or kidney disease, talk with your healthcare provider before using these medicines. Also talk with your provider if you have ever had a stomach ulcer or gastrointestinal bleeding.    If you wear contact lenses, don't use them until your healthcare provider says it's OK.    Follow-up care  Follow up with your healthcare provider, or as advised.   When to seek  medical advice  Call your healthcare provider right away if any of the following occur:     Eyelid swells more    Eye pain gets worse    Redness or drainage from the eye gets worse    Blurry vision gets worse, or you have increased sensitivity to light    Normal vision does not return within 24 to 48 hours  Mariella last reviewed this educational content on 6/1/2022 2000-2022 [employer]. All rights reserved. This information is not intended as a substitute for professional medical care. Always follow your healthcare professional's instructions.

## 2023-04-14 ENCOUNTER — TRANSFERRED RECORDS (OUTPATIENT)
Dept: HEALTH INFORMATION MANAGEMENT | Facility: CLINIC | Age: 38
End: 2023-04-14
Payer: COMMERCIAL

## 2023-04-21 ENCOUNTER — TRANSFERRED RECORDS (OUTPATIENT)
Dept: HEALTH INFORMATION MANAGEMENT | Facility: CLINIC | Age: 38
End: 2023-04-21
Payer: COMMERCIAL

## 2023-04-25 ENCOUNTER — TRANSFERRED RECORDS (OUTPATIENT)
Dept: HEALTH INFORMATION MANAGEMENT | Facility: CLINIC | Age: 38
End: 2023-04-25
Payer: COMMERCIAL

## 2023-05-05 ENCOUNTER — TRANSFERRED RECORDS (OUTPATIENT)
Dept: HEALTH INFORMATION MANAGEMENT | Facility: CLINIC | Age: 38
End: 2023-05-05
Payer: COMMERCIAL

## 2023-05-22 DIAGNOSIS — E05.00 GRAVES DISEASE: ICD-10-CM

## 2023-05-22 RX ORDER — PROPRANOLOL HYDROCHLORIDE 10 MG/1
10 TABLET ORAL 2 TIMES DAILY
Qty: 60 TABLET | Refills: 3 | Status: SHIPPED | OUTPATIENT
Start: 2023-05-22 | End: 2024-01-16

## 2023-05-22 NOTE — TELEPHONE ENCOUNTER
Requested Prescriptions   Pending Prescriptions Disp Refills     propranolol (INDERAL) 10 MG tablet 60 tablet 3     Sig: Take 1 tablet (10 mg) by mouth 2 times daily       There is no refill protocol information for this order        Last Written Prescription Date:  11/7/2022  Last Fill Quantity: 60 tablet,  # refills: 3   Last office visit: Visit date not found ; last virtual visit: Visit date not found with prescribing provider:  Paty Shanks MD   Future Office Visit:  07/25/2023

## 2023-06-08 DIAGNOSIS — F41.1 GENERALIZED ANXIETY DISORDER: ICD-10-CM

## 2023-06-08 DIAGNOSIS — F32.0 MILD MAJOR DEPRESSION (H): ICD-10-CM

## 2023-06-12 DIAGNOSIS — Z30.44 ENCOUNTER FOR SURVEILLANCE OF VAGINAL RING HORMONAL CONTRACEPTIVE DEVICE: ICD-10-CM

## 2023-06-12 RX ORDER — ETONOGESTREL AND ETHINYL ESTRADIOL .12; .015 MG/D; MG/D
RING VAGINAL
Qty: 3 EACH | Refills: 0 | Status: SHIPPED | OUTPATIENT
Start: 2023-06-12 | End: 2023-09-05

## 2023-06-16 ENCOUNTER — TELEPHONE (OUTPATIENT)
Dept: FAMILY MEDICINE | Facility: CLINIC | Age: 38
End: 2023-06-16
Payer: COMMERCIAL

## 2023-06-16 NOTE — TELEPHONE ENCOUNTER
Patient Quality Outreach    Patient is due for the following:   Depression  -  PHQ-9 needed    Next Steps:   Patient was assigned appropriate questionnaire to complete    Type of outreach:    Sent Songza message.      Questions for provider review:    None           Margie Braun CMA

## 2023-07-06 ENCOUNTER — VIRTUAL VISIT (OUTPATIENT)
Dept: SURGERY | Facility: CLINIC | Age: 38
End: 2023-07-06
Payer: COMMERCIAL

## 2023-07-06 VITALS — BODY MASS INDEX: 29.88 KG/M2 | HEIGHT: 64 IN | WEIGHT: 175 LBS

## 2023-07-06 DIAGNOSIS — E66.09 CLASS 1 OBESITY DUE TO EXCESS CALORIES WITHOUT SERIOUS COMORBIDITY WITH BODY MASS INDEX (BMI) OF 30.0 TO 30.9 IN ADULT: Primary | ICD-10-CM

## 2023-07-06 DIAGNOSIS — Z98.84 BARIATRIC SURGERY STATUS: ICD-10-CM

## 2023-07-06 DIAGNOSIS — K21.9 GERD WITHOUT ESOPHAGITIS: ICD-10-CM

## 2023-07-06 DIAGNOSIS — K91.2 POSTSURGICAL MALABSORPTION: ICD-10-CM

## 2023-07-06 DIAGNOSIS — E66.811 CLASS 1 OBESITY DUE TO EXCESS CALORIES WITHOUT SERIOUS COMORBIDITY WITH BODY MASS INDEX (BMI) OF 30.0 TO 30.9 IN ADULT: Primary | ICD-10-CM

## 2023-07-06 PROCEDURE — 99214 OFFICE O/P EST MOD 30 MIN: CPT | Mod: VID | Performed by: PHYSICIAN ASSISTANT

## 2023-07-06 RX ORDER — OMEPRAZOLE 40 MG/1
40 CAPSULE, DELAYED RELEASE ORAL DAILY
Qty: 90 CAPSULE | Refills: 0 | Status: SHIPPED | OUTPATIENT
Start: 2023-07-06 | End: 2023-09-21

## 2023-07-06 NOTE — PROGRESS NOTES
"Tram is a 37 year old who is being evaluated via a billable video visit.      The patient has been notified of following:     \"This video visit will be conducted via a call between you and your physician/provider. We have found that certain health care needs can be provided without the need for an in-person physical exam.  This service lets us provide the care you need with a video conversation.  If a prescription is necessary we can send it directly to your pharmacy.  If lab work is needed we can place an order for that and you can then stop by our lab to have the test done at a later time.    Video visits are billed at different rates depending on your insurance coverage.  Please reach out to your insurance provider with any questions.    If during the course of the call the physician/provider feels a video visit is not appropriate, you will not be charged for this service.\"    Patient has given verbal consent for Video visit? Yes    How would you like to obtain your AVS? MyChart    If the video visit is dropped, the invitation should be resent by: MY CHART    Will anyone else be joining your video visit? No    I    Video-Visit Details    Type of service:  Video Visit    Video Start Time: 11:04    Video End Time: 11:19    Originating Location (pt. Location): Home    Distant Location (provider location):  Home    Platform used for Video Visit: [facility] Bariatric Surgery Note      RE: Tram Bal  MR#: [id number]  : 1985  VISIT DATE: 2023         Dear Trudi Guevara,    I had the pleasure of seeing your patient, Tram Bal, in my post-bariatric surgery assessment clinic.    CHIEF COMPLAINT: Post-bariatric surgery follow-up. Last year heartburn was well managed with omeprazole 40 mg. Now is taking otc omeprazole 20 mg as has heartburn daily. This past week real bad. Waking up coughing due to acid. No nausea. Has a bubbly feeling in mouth. Hurts to drink water so intake has " been less.  Also has vomited 3 times in the past week.  Had UGI last year and all fluid was removed from her band.      HISTORY OF PRESENT ILLNESS:      7/5/2023     1:29 PM   Questions Regarding Prior Weight Loss Surgery Reviewed With Patient   I had the following weight loss procedure Laparoscopic Adjustable Gastric Band   What year was your surgery? 2011   How has your weight changed since your last visit? I have stayed about the same   Do you currently have any of the following Heartburn, acid reflux, or GERD (acid reflux disease)?   Do you have any concerns today? Still having acid reflux and getting worse       Weight Self Assessment:      7/5/2023     1:29 PM   NBS BAND WEIGHT SELF ASSESSMENT   Please check the boxes (1-3 boxes) which you think have been influencing your weight in the past month. The band is not in the right setting     Weight History:      7/5/2023     1:29 PM   --   What is your highest lifetime weight? 230   What is your lowest weight since surgery? (In pounds) 165     Initial Weight (lbs): 184 lbs       Today's Weight:  Weight: 175 lb (79.4 kg) (reported by patient)  Cumulative weight loss (lbs): 9        7/5/2023     1:29 PM   Questions Regarding Co-Morbidities and Health Concerns Reviewed With Patient   Pre-diabetes Gone away   Diabetes II Never   High Blood Pressure Never   High cholesterol Improved   Heartburn/Reflux Worsened   Sleep apnea Never   PCOS Never   Back pain Improved   Joint pain Never   Lower leg swelling Never           7/5/2023     1:29 PM   Eating Habits   How many meals do you eat per day? 3   Do you snack between meals? Sometimes   How much food are you eating at each meal? 1/2 cup to 1 cup   Are you able to separate your meals and liquids by at least 30 minutes? Yes   Are you able to avoid liquid calories? Yes     Tons of water.         7/5/2023     1:29 PM   Exercise Questions Reviewed With Patient   How often do you exercise? 1 to 2 times per week   What is the  "duration of your exercise (in minutes)? 45 Minutes   What types of exercise do you do? gym membership    other   What keeps you from being more active? Lack of Time       Social History:      7/5/2023     1:29 PM   --   Are you smoking? No   Are you drinking alcohol? Yes   How much alcohol? Once a week      Drinks a couple alcoholic beverages weekly       Medications: Reviewed and Updated in Epic    ROS:      7/5/2023     1:29 PM   --   Vomiting Yes   Diarrhea Yes   Constipation No   Swallowing trouble Yes   Abdominal pain No   Heartburn Yes      If drinks too much water and lays down at night will vomit.        Band ROS:      7/5/2023     1:29 PM   --   I am hungry between meals? No   I am eating between meals? No   I am eating > 1 cup of food at meals? No   I am not losing 1-2 lbs a week? Yes   I do not feel a sense of restriction? No           7/5/2023     1:29 PM   --   I have pain when swallowing foods or liquids. Yes   I have heart burn, vomiting, or reflux. Yes   I have night cough or hiccups. Yes   I am making poor food choices (smoothies, shakes, chips). No   I am unable to eat chicken, steak, and bread. No           7/5/2023     1:29 PM   --   Are you pregnant? No   Will you be traveling to remote areas? Yes   Will you be having major surgery soon? No       PHYSICAL EXAMINATION:  Ht 5' 4\" (1.626 m)   Wt 175 lb (79.4 kg)   BMI 30.04 kg/m     GENERAL: Healthy, alert and no distress  EYES: Eyes grossly normal to inspection.  No discharge or erythema, or obvious scleral/conjunctival abnormalities.  RESP: No audible wheeze, cough, or visible cyanosis.  No visible retractions or increased work of breathing.    SKIN: Visible skin clear. No significant rash, abnormal pigmentation or lesions.  NEURO: Cranial nerves grossly intact.  Mentation and speech appropriate for age.  PSYCH: Mentation appears normal, affect normal/bright, judgement and insight intact, normal speech and appearance " well-groomed.        ASSESSMENT AND PLAN:      1. 12 years status laparoscopic adjustable gastric band  2. Morbid Obesity - resolved current BMI: Body mass index is 30.04 kg/m .  3. Post surgical malabsorption:   Labs ordered per protocol.   Follow food plan per dietitian recommendations.   Continue taking recommended post-op vitamins.  4. GERD without esophagitis - RX for omeprazole 40 mg sent to pharmacy   5. Patient will contact insurance regarding coverage for lap band removal due to GERD. If covered will schedule a consultation with Dr Zaragoza for removal.  If not removed schedule for annual in a year      Sincerely,     Nikita Winter PA-C      30 minutes spent on the date of the encounter doing chart review, review of test results, patient visit and documentation

## 2023-07-06 NOTE — PATIENT INSTRUCTIONS
"To ensure the quality you may receive a patient satisfaction survey. The greatest compliment you can give is \"Likely to Recommend\"    Nice to talk with you today. Thank you for allowing me the privilege of caring for you. Below is the plan discussed.-  . Nikita Winter PA-C    Plan:  1. Post surgical malabsorption:   Labs ordered per protocol. - Call [phone] to schedule.   Follow food plan per dietitian recommendations.   Continue taking recommended post-op vitamins.  2. GERD without esophagitis - RX for omeprazole 40 mg sent to pharmacy   3. Patient will contact insurance regarding coverage for lap band removal due to GERD. If covered will schedule a consultation with Dr Zaragoza for removal.  If not removed schedule for annual in a year      FOLLOW-UP:  Call [phone] to schedule next visit.         Bariatric Post Op Guidelines  General:    To avoid marginal ulcers avoid all forms of tobacco, alcohol in excess, caffeine, and NSAIDS     Exercise is key for weight loss and weight maintenance. Aim for 30-60 minutes of physical activity most days.  Include cardiovascular and strength training.    Continue lifelong vitamins supplementation and annual lab follow up.  All  patients should supplement with the following bariatric postoperative vitamins:  2 Complete multivitamins with minerals (at different times than calcium)  Vitamin D 5000 Int Units/125 mg daily   Calcium 600 mg twice daily or 500 mg three times daily   Vitamin B12: 500 mcg sl daily or 1000 mcg Inj monthly  B complex daily or Thiamine 100 mg weekly  1 Iron/Vit C. Daily for females who menstruate and/or as directed    The bariatric team should be aware and evaluate all GI symptoms which can be a sign of complications. Inability to tolerate textured solid food (chicken, steak, fish) may need to be evaluated by endoscopy.    There is a 10% increase of Alcohol Use Disorder in patients with bariatric surgery.   Most often occurring around 2 years post " op.  Call if you feel alcohol is interfering in your daily life.  We can help.     Follow up annually lifelong. Obesity is a chronic disease.  Weight gain can be expected. The goal of follow-up visits is to ensure adequate vitamin and protein absorption, evaluate food intake behavior, review exercise/activity level, and assist with weight regain.    Nutritional:  Eat 3 meals per day  (No snacks between meals.)  Do not skip meals.  This can cause overeating at the next meal and will prevent adequate protein and nutritional intake.    Aim for 60-80 grams of protein per day.  Always eat your protein first. This assists with optimal nutrition and helps you stay full longer.    Eat your protein first, and then follow with fiber.    Add fiber by including fruits, vegetables, whole grains, and beans.     Portions should be about 1 cup per meal. Use measuring cups to be accurate.  Continue to use saucer/salad plates, infant/toddler silverware to keep portion sizes small and take small bites.    Eat S-[id number] to make each meal last 20-30 minutes. Always stop eating when satisfied.    Aim for 64 oz. of calorie-free fluids daily.    Avoid drinking 30 min before, during, and 30 min after meal    Avoid high sugar and high fat foods to prevent high calorie intake. This will reduce your rate of weight loss and can cause weight regain.   Check nutrition labels for less than 10 grams of sugar and less than 10 grams of fat per serving.

## 2023-07-24 ASSESSMENT — ENCOUNTER SYMPTOMS
ORTHOPNEA: 0
PALPITATIONS: 1
NECK PAIN: 1
DIARRHEA: 0
HEARTBURN: 1
LIGHT-HEADEDNESS: 0
NAIL CHANGES: 0
HYPOTENSION: 0
SLEEP DISTURBANCES DUE TO BREATHING: 0
MUSCLE CRAMPS: 0
INSOMNIA: 0
JAUNDICE: 0
NAUSEA: 0
BOWEL INCONTINENCE: 0
STIFFNESS: 0
HYPERTENSION: 0
BACK PAIN: 0
PANIC: 0
SKIN CHANGES: 0
ARTHRALGIAS: 1
DEPRESSION: 1
BLOOD IN STOOL: 0
CONSTIPATION: 0
VOMITING: 0
SYNCOPE: 0
POOR WOUND HEALING: 0
NERVOUS/ANXIOUS: 1
ABDOMINAL PAIN: 0
EXERCISE INTOLERANCE: 1
RECTAL PAIN: 0
JOINT SWELLING: 0
LEG PAIN: 0
MYALGIAS: 1
MUSCLE WEAKNESS: 0
BLOATING: 0
DECREASED CONCENTRATION: 0

## 2023-07-25 ENCOUNTER — VIRTUAL VISIT (OUTPATIENT)
Dept: ENDOCRINOLOGY | Facility: CLINIC | Age: 38
End: 2023-07-25
Payer: COMMERCIAL

## 2023-07-25 DIAGNOSIS — E05.00 GRAVES DISEASE: Primary | ICD-10-CM

## 2023-07-25 PROCEDURE — 99213 OFFICE O/P EST LOW 20 MIN: CPT | Mod: VID | Performed by: INTERNAL MEDICINE

## 2023-07-25 NOTE — PROGRESS NOTES
Video-Visit Details    Type of service:  Video Visit  Video Start Time: 9:09  Video End Time: 9:18  Originating Location (pt. Location): Home, [place]  Distant Location (provider location):  Home  Platform used for Video Visit: Stefanie Shanks MD    Tram Bal is a 38 year old year old female here for evaluation of hyperthyroidism/Grave's disease via a billable video visit. She was previously seen by  me Jan 2023. She aslo has PMHx of generalized anxiety disorder and GERD, palpations    Chief Complaint   Patient presents with     Follow Up     Graves disease +1 more       1) Grave's Disease  Diagnosis April 2022 experiencing palpations without any other hyperthyroid symptoms, found to have TSH < 0.01, free T4: 1.25, TT3: 192 with TSI: 1.4. She was initially started on propranolol 20mg TID which helped improved symptoms. Does note 1-2 months prior to symptom development had contrasted CT with evaluation of stomach (gastric bypass) She was started on methimazole 5mg at visit in June    Treatment Methimazole 5mg daily, propranolol 20mg (2 tab) daily    INTERVAL HISTORY:  Palpations and insomnia have improved, does notice that if misses propranolol will have heart fluttering. Continuing propranolol per PCP  _ notes loss of hair seemingly more than usual  Denies other signs/symptoms of hypo/hyperthyroidism including hot/cold intolerance, diarrhea/constipation, major changes in hair skin or nails, tremor, palpations, or fatigue.  Denies any difficulty breathing, difficulty swallowing, shortness of breath, enlargement of neck/thyroid, family history of thyroid cancer, exposure to radiation, hoarseness or weight loss.     TSH   Date Value Ref Range Status   02/09/2023 1.89 0.30 - 4.20 uIU/mL Final   11/06/2022 3.88 0.40 - 4.00 mU/L Final   10/26/2018 1.20 0.40 - 4.00 mU/L Final     Free T4   Date Value Ref Range Status   02/09/2023 0.92 0.90 - 1.70 ng/dL Final     TSI 1.2 (2/2023)             Active diagnoses  this visit:  Graves disease     ROS: 10 point ROS neg other than the symptoms noted above in the HPI.      Medical, surgical, social, and family histories, medications and allergies reviewed and updated.  Past Medical History:   Diagnosis Date     Abnormal Pap smear of cervix 2004    , , , ,      Cervical high risk HPV (human papillomavirus) test positive 2017     Depressive disorder 2016     Displacement of lumbar intervertebral disc 05/15/2008     Esophageal reflux      Hx of colposcopy with cervical biopsy 2017:[place]- Benign.      Thyroid disease        Past Surgical History:   Procedure Laterality Date     COLPOSCOPY,LOOP ELECTRD CERVIX EXCIS  2005    JARETH III     GI SURGERY  10    LAP BANDING for obesity, pre-DM     ZZC NONSPECIFIC PROCEDURE      Oral surgery       Allergies:  Famvir [famciclovir]    Social History     Tobacco Use     Smoking status: Former     Packs/day: 0.00     Years: 3.00     Pack years: 0.00     Types: Cigarettes     Quit date: 2010     Years since quittin.2     Smokeless tobacco: Never     Tobacco comments:     quit smoking may 2010    Substance Use Topics     Alcohol use: Yes     Comment: occasionally       Family History   Problem Relation Age of Onset     Thyroid Disease Mother      Unknown/Adopted Mother      Hyperlipidemia Father      Hypertension Paternal Grandmother      Hyperlipidemia Paternal Grandmother      Diabetes Paternal Aunt      Hypertension Paternal Aunt      Lipids Paternal Aunt      Thyroid Disease Maternal Half-Brother      Unknown/Adopted No family hx of         unaware of maternal grandparents history as pt mom was adopted     Breast Cancer No family hx of      Cancer - colorectal No family hx of      Gynecology No family hx of         no ovarian cancer     Coronary Artery Disease No family hx of      Cerebrovascular Disease No family hx of      Thyroid Disease No family hx of       Osteoporosis No family hx of          Objective:  There were no vitals taken for this visit.    Physical Exam (visual exam)  VS:  no vital signs taken for video visit  CONSTITUTIONAL: healthy, alert and NAD, responding appropriately  ENT: normocephalic, no visual evidence of trauma, normal nose and oral mucosa  EYES: conjunctivae and sclerae normal, no exophthalmos or proptosis  THYROID:  no visualized nodules or goiter  LUNGS: no audible wheeze, cough or visible cyanosis, no visible retractions or increased work of breathing  EXTREMITIES: no hand tremors  NEUROLOGY: cranial nerves grossly intact with no obvious deficit.  SKIN:  no visualized skin lesions or rash, no edema visualized  PSYCH: mentation appears normal, normal judgement      Lab Results   Component Value Date/Time    TSH 1.89 02/09/2023 12:16 PM    TSH 3.88 11/06/2022 01:05 AM    TSH 1.20 10/26/2018 02:44 PM    T4 0.92 02/09/2023 12:16 PM    PTHI 21 01/14/2020 01:32 PM     Last Comprehensive Metabolic Panel:  Sodium   Date Value Ref Range Status   11/06/2022 139 133 - 144 mmol/L Final   10/26/2018 141 133 - 144 mmol/L Final     Potassium   Date Value Ref Range Status   11/06/2022 3.8 3.4 - 5.3 mmol/L Final   10/26/2018 4.1 3.4 - 5.3 mmol/L Final     Chloride   Date Value Ref Range Status   11/06/2022 108 94 - 109 mmol/L Final   10/26/2018 107 94 - 109 mmol/L Final     Carbon Dioxide   Date Value Ref Range Status   10/26/2018 24 20 - 32 mmol/L Final     Carbon Dioxide (CO2)   Date Value Ref Range Status   11/06/2022 22 20 - 32 mmol/L Final     Anion Gap   Date Value Ref Range Status   11/06/2022 9 3 - 14 mmol/L Final   10/26/2018 10 3 - 14 mmol/L Final     Glucose   Date Value Ref Range Status   11/06/2022 92 70 - 99 mg/dL Final   10/26/2018 86 70 - 99 mg/dL Final     Urea Nitrogen   Date Value Ref Range Status   11/06/2022 16 7 - 30 mg/dL Final   10/26/2018 10 7 - 30 mg/dL Final     Creatinine   Date Value Ref Range Status   11/06/2022 0.61 0.52 - 1.04  mg/dL Final   10/26/2018 0.68 0.52 - 1.04 mg/dL Final     GFR Estimate   Date Value Ref Range Status   11/06/2022 >90 >60 mL/min/1.73m2 Final     Comment:     Effective December 21, 2021 eGFRcr in adults is calculated using the 2021 CKD-EPI creatinine equation which includes age and gender (Torito et al., [place], DOI: 10.1056/HKSFbn9453013)   10/26/2018 >90 >60 mL/min/1.7m2 Final     Comment:     Non  GFR Calc     Calcium   Date Value Ref Range Status   11/06/2022 8.7 8.5 - 10.1 mg/dL Final   10/26/2018 8.4 (L) 8.5 - 10.1 mg/dL Final       ASSESSMENT / PLAN:  No diagnosis found.      1) Grave's Disease  - No recent TFT update.   - Ok to continue propranolol per PCP, discussed no longer necessary for thyroid   - Recheck TFTs now-- has been on meds for 13 months-- if TFTs remain normal and TSI in normal range, will stop MMI  - If do stop MMI, will recheck TFTs at 6 weeks, 3 months, 6 months, and 12 months  - Discussed she is on propranolol which may mask recurrence of some symptoms, which makes regular lab check-ins important  - Continue MMI 5mg daily for now  -Briefly reviewed treatment options for Grave's disease--- approximately 1/3 of people go into remission and may see this evidenced around 12-18 months. Approximately 2/3 of people require definitive therapy for thyroid which could be prolonged thioamide therapy, thyroidectomy, or radioactive iodine. Will discuss these further at future visits as indicated.      Orders Placed This Encounter   Procedures     TSH     T4 free     T3, total     Thyroid stimulating immunoglobulin         RTC 12 months    A total of 25 minutes were spent today 07/25/23 on this visit including chart review, history and counseling, documentation and other activities as detailed above.     Answers for HPI/ROS submitted by the patient on 7/24/2023  General Symptoms: No  Skin Symptoms: Yes  HENT Symptoms: No  EYE SYMPTOMS: No  HEART SYMPTOMS: Yes  LUNG SYMPTOMS: No  INTESTINAL  SYMPTOMS: Yes  URINARY SYMPTOMS: No  GYNECOLOGIC SYMPTOMS: No  BREAST SYMPTOMS: No  SKELETAL SYMPTOMS: Yes  BLOOD SYMPTOMS: No  NERVOUS SYSTEM SYMPTOMS: No  MENTAL HEALTH SYMPTOMS: Yes  Changes in hair: Yes  Changes in moles/birth marks: No  Itching: No  Rashes: No  Changes in nails: No  Acne: No  Hair in places you don't want it: No  Change in facial hair: No  Warts: No  Non-healing sores: No  Scarring: No  Flaking of skin: No  Color changes of hands/feet in cold : No  Sun sensitivity: No  Skin thickening: No  Chest pain or pressure: No  Fast or irregular heartbeat: Yes  Pain in legs with walking: No  Trouble breathing while lying down: No  Fingers or toes appear blue: No  High blood pressure: No  Low blood pressure: No  Fainting: No  Murmurs: No  Pacemaker: No  Varicose veins: No  Edema or swelling: No  Wake up at night with shortness of breath: No  Light-headedness: No  Exercise intolerance: Yes  Heart burn or indigestion: Yes  Nausea: No  Vomiting: No  Abdominal pain: No  Bloating: No  Constipation: No  Diarrhea: No  Blood in stool: No  Black stools: No  Rectal or Anal pain: No  Fecal incontinence: No  Yellowing of skin or eyes: No  Vomit with blood: No  Change in stools: No  Back pain: No  Muscle aches: Yes  Neck pain: Yes  Swollen joints: No  Joint pain: Yes  Bone pain: No  Muscle cramps: No  Muscle weakness: No  Joint stiffness: No  Bone fracture: No  Nervous or Anxious: Yes  Depression: Yes  Trouble sleeping: No  Trouble thinking or concentrating: No  Mood changes: No  Panic attacks: No

## 2023-07-25 NOTE — LETTER
7/25/2023         RE: Tram Bal  [address] [facility] [address]        Dear Colleague,    Thank you for referring your patient, Tram Bal, to the [facility] [place]. Please see a copy of my visit note below.      Video-Visit Details    Type of service:  Video Visit  Video Start Time: 9:09  Video End Time: 9:18  Originating Location (pt. Location): [place], [place]  Distant Location (provider location):  Home  Platform used for Video Visit: Stefanie Shanks MD    Tram Bal is a 38 year old year old female here for evaluation of hyperthyroidism/Grave's disease via a billable video visit. She was previously seen by  me Jan 2023. She aslo has PMHx of generalized anxiety disorder and GERD, palpations    Chief Complaint   Patient presents with     Follow Up     Graves disease +1 more       1) Grave's Disease  Diagnosis April 2022 experiencing palpations without any other hyperthyroid symptoms, found to have TSH < 0.01, free T4: 1.25, TT3: 192 with TSI: 1.4. She was initially started on propranolol 20mg TID which helped improved symptoms. Does note 1-2 months prior to symptom development had contrasted CT with evaluation of stomach (gastric bypass) She was started on methimazole 5mg at visit in June    Treatment Methimazole 5mg daily, propranolol 20mg (2 tab) daily    INTERVAL HISTORY:  Palpations and insomnia have improved, does notice that if misses propranolol will have heart fluttering. Continuing propranolol per PCP  _ notes loss of hair seemingly more than usual  Denies other signs/symptoms of hypo/hyperthyroidism including hot/cold intolerance, diarrhea/constipation, major changes in hair skin or nails, tremor, palpations, or fatigue.  Denies any difficulty breathing, difficulty swallowing, shortness of breath, enlargement of neck/thyroid, family history of thyroid cancer, exposure to radiation, hoarseness or weight loss.     TSH   Date Value Ref Range Status    2023 1.89 0.30 - 4.20 uIU/mL Final   2022 3.88 0.40 - 4.00 mU/L Final   10/26/2018 1.20 0.40 - 4.00 mU/L Final     Free T4   Date Value Ref Range Status   2023 0.92 0.90 - 1.70 ng/dL Final     TSI 1.2 (2023)             Active diagnoses this visit:  Graves disease     ROS: 10 point ROS neg other than the symptoms noted above in the HPI.      Medical, surgical, social, and family histories, medications and allergies reviewed and updated.  Past Medical History:   Diagnosis Date     Abnormal Pap smear of cervix 2004, , , ,      Cervical high risk HPV (human papillomavirus) test positive 2017,      Depressive disorder      Displacement of lumbar intervertebral disc 05/15/2008     Esophageal reflux      Hx of colposcopy with cervical biopsy 2017:Wayan- Benign.      Thyroid disease        Past Surgical History:   Procedure Laterality Date     COLPOSCOPY,LOOP ELECTRD CERVIX EXCIS  2005    JARETH III     GI SURGERY  10    LAP BANDING for obesity, pre-DM     ZZC NONSPECIFIC PROCEDURE      Oral surgery       Allergies:  Famvir [famciclovir]    Social History     Tobacco Use     Smoking status: Former     Packs/day: 0.00     Years: 3.00     Pack years: 0.00     Types: Cigarettes     Quit date: 2010     Years since quittin.2     Smokeless tobacco: Never     Tobacco comments:     quit smoking may 2010    Substance Use Topics     Alcohol use: Yes     Comment: occasionally       Family History   Problem Relation Age of Onset     Thyroid Disease Mother      Unknown/Adopted Mother      Hyperlipidemia Father      Hypertension Paternal Grandmother      Hyperlipidemia Paternal Grandmother      Diabetes Paternal Aunt      Hypertension Paternal Aunt      Lipids Paternal Aunt      Thyroid Disease Maternal Half-Brother      Unknown/Adopted No family hx of         unaware of maternal grandparents history as pt mom was adopted     Breast  Cancer No family hx of      Cancer - colorectal No family hx of      Gynecology No family hx of         no ovarian cancer     Coronary Artery Disease No family hx of      Cerebrovascular Disease No family hx of      Thyroid Disease No family hx of      Osteoporosis No family hx of          Objective:  There were no vitals taken for this visit.    Physical Exam (visual exam)  VS:  no vital signs taken for video visit  CONSTITUTIONAL: healthy, alert and NAD, responding appropriately  ENT: normocephalic, no visual evidence of trauma, normal nose and oral mucosa  EYES: conjunctivae and sclerae normal, no exophthalmos or proptosis  THYROID:  no visualized nodules or goiter  LUNGS: no audible wheeze, cough or visible cyanosis, no visible retractions or increased work of breathing  EXTREMITIES: no hand tremors  NEUROLOGY: cranial nerves grossly intact with no obvious deficit.  SKIN:  no visualized skin lesions or rash, no edema visualized  PSYCH: mentation appears normal, normal judgement      Lab Results   Component Value Date/Time    TSH 1.89 02/09/2023 12:16 PM    TSH 3.88 11/06/2022 01:05 AM    TSH 1.20 10/26/2018 02:44 PM    T4 0.92 02/09/2023 12:16 PM    PTHI 21 01/14/2020 01:32 PM     Last Comprehensive Metabolic Panel:  Sodium   Date Value Ref Range Status   11/06/2022 139 133 - 144 mmol/L Final   10/26/2018 141 133 - 144 mmol/L Final     Potassium   Date Value Ref Range Status   11/06/2022 3.8 3.4 - 5.3 mmol/L Final   10/26/2018 4.1 3.4 - 5.3 mmol/L Final     Chloride   Date Value Ref Range Status   11/06/2022 108 94 - 109 mmol/L Final   10/26/2018 107 94 - 109 mmol/L Final     Carbon Dioxide   Date Value Ref Range Status   10/26/2018 24 20 - 32 mmol/L Final     Carbon Dioxide (CO2)   Date Value Ref Range Status   11/06/2022 22 20 - 32 mmol/L Final     Anion Gap   Date Value Ref Range Status   11/06/2022 9 3 - 14 mmol/L Final   10/26/2018 10 3 - 14 mmol/L Final     Glucose   Date Value Ref Range Status    11/06/2022 92 70 - 99 mg/dL Final   10/26/2018 86 70 - 99 mg/dL Final     Urea Nitrogen   Date Value Ref Range Status   11/06/2022 16 7 - 30 mg/dL Final   10/26/2018 10 7 - 30 mg/dL Final     Creatinine   Date Value Ref Range Status   11/06/2022 0.61 0.52 - 1.04 mg/dL Final   10/26/2018 0.68 0.52 - 1.04 mg/dL Final     GFR Estimate   Date Value Ref Range Status   11/06/2022 >90 >60 mL/min/1.73m2 Final     Comment:     Effective December 21, 2021 eGFRcr in adults is calculated using the 2021 CKD-EPI creatinine equation which includes age and gender (Torito et al., NEJ, DOI: 10.1056/VPLIzs8308178)   10/26/2018 >90 >60 mL/min/1.7m2 Final     Comment:     Non  GFR Calc     Calcium   Date Value Ref Range Status   11/06/2022 8.7 8.5 - 10.1 mg/dL Final   10/26/2018 8.4 (L) 8.5 - 10.1 mg/dL Final       ASSESSMENT / PLAN:  No diagnosis found.      1) Grave's Disease  - No recent TFT update.   - Ok to continue propranolol per PCP, discussed no longer necessary for thyroid   - Recheck TFTs now-- has been on meds for 13 months-- if TFTs remain normal and TSI in normal range, will stop MMI  - If do stop MMI, will recheck TFTs at 6 weeks, 3 months, 6 months, and 12 months  - Discussed she is on propranolol which may mask recurrence of some symptoms, which makes regular lab check-ins important  - Continue MMI 5mg daily for now  -Briefly reviewed treatment options for Grave's disease--- approximately 1/3 of people go into remission and may see this evidenced around 12-18 months. Approximately 2/3 of people require definitive therapy for thyroid which could be prolonged thioamide therapy, thyroidectomy, or radioactive iodine. Will discuss these further at future visits as indicated.      Orders Placed This Encounter   Procedures     TSH     T4 free     T3, total     Thyroid stimulating immunoglobulin         RTC 12 months    A total of 25 minutes were spent today 07/25/23 on this visit including chart review,  history and counseling, documentation and other activities as detailed above.     Answers for HPI/ROS submitted by the patient on 7/24/2023  General Symptoms: No  Skin Symptoms: Yes  HENT Symptoms: No  EYE SYMPTOMS: No  HEART SYMPTOMS: Yes  LUNG SYMPTOMS: No  INTESTINAL SYMPTOMS: Yes  URINARY SYMPTOMS: No  GYNECOLOGIC SYMPTOMS: No  BREAST SYMPTOMS: No  SKELETAL SYMPTOMS: Yes  BLOOD SYMPTOMS: No  NERVOUS SYSTEM SYMPTOMS: No  MENTAL HEALTH SYMPTOMS: Yes  Changes in hair: Yes  Changes in moles/birth marks: No  Itching: No  Rashes: No  Changes in nails: No  Acne: No  Hair in places you don't want it: No  Change in facial hair: No  Warts: No  Non-healing sores: No  Scarring: No  Flaking of skin: No  Color changes of hands/feet in cold : No  Sun sensitivity: No  Skin thickening: No  Chest pain or pressure: No  Fast or irregular heartbeat: Yes  Pain in legs with walking: No  Trouble breathing while lying down: No  Fingers or toes appear blue: No  High blood pressure: No  Low blood pressure: No  Fainting: No  Murmurs: No  Pacemaker: No  Varicose veins: No  Edema or swelling: No  Wake up at night with shortness of breath: No  Light-headedness: No  Exercise intolerance: Yes  Heart burn or indigestion: Yes  Nausea: No  Vomiting: No  Abdominal pain: No  Bloating: No  Constipation: No  Diarrhea: No  Blood in stool: No  Black stools: No  Rectal or Anal pain: No  Fecal incontinence: No  Yellowing of skin or eyes: No  Vomit with blood: No  Change in stools: No  Back pain: No  Muscle aches: Yes  Neck pain: Yes  Swollen joints: No  Joint pain: Yes  Bone pain: No  Muscle cramps: No  Muscle weakness: No  Joint stiffness: No  Bone fracture: No  Nervous or Anxious: Yes  Depression: Yes  Trouble sleeping: No  Trouble thinking or concentrating: No  Mood changes: No  Panic attacks: No          Again, thank you for allowing me to participate in the care of your patient.        Sincerely,        Paty Shanks MD

## 2023-07-25 NOTE — PATIENT INSTRUCTIONS
If do stop methimazole, will recheck thyroid functions at 6 weeks, 3 months, 6 months, and 12 months

## 2023-08-02 ENCOUNTER — LAB (OUTPATIENT)
Dept: LAB | Facility: CLINIC | Age: 38
End: 2023-08-02
Payer: COMMERCIAL

## 2023-08-02 DIAGNOSIS — K91.2 POSTSURGICAL MALABSORPTION: ICD-10-CM

## 2023-08-02 DIAGNOSIS — E05.00 GRAVES DISEASE: ICD-10-CM

## 2023-08-02 LAB
ERYTHROCYTE [DISTWIDTH] IN BLOOD BY AUTOMATED COUNT: 15.5 % (ref 10–15)
HCT VFR BLD AUTO: 37.3 % (ref 35–47)
HGB BLD-MCNC: 11.9 G/DL (ref 11.7–15.7)
MCH RBC QN AUTO: 25.4 PG (ref 26.5–33)
MCHC RBC AUTO-ENTMCNC: 31.9 G/DL (ref 31.5–36.5)
MCV RBC AUTO: 80 FL (ref 78–100)
PLATELET # BLD AUTO: 527 10E3/UL (ref 150–450)
PTH-INTACT SERPL-MCNC: 18 PG/ML (ref 15–65)
RBC # BLD AUTO: 4.69 10E6/UL (ref 3.8–5.2)
T3 SERPL-MCNC: 164 NG/DL (ref 85–202)
T4 FREE SERPL-MCNC: 1.04 NG/DL (ref 0.9–1.7)
TSH SERPL DL<=0.005 MIU/L-ACNC: 2.71 UIU/ML (ref 0.3–4.2)
VIT B12 SERPL-MCNC: 340 PG/ML (ref 232–1245)
WBC # BLD AUTO: 8.6 10E3/UL (ref 4–11)

## 2023-08-02 PROCEDURE — 84480 ASSAY TRIIODOTHYRONINE (T3): CPT

## 2023-08-02 PROCEDURE — 84445 ASSAY OF TSI GLOBULIN: CPT | Mod: 90

## 2023-08-02 PROCEDURE — 36415 COLL VENOUS BLD VENIPUNCTURE: CPT

## 2023-08-02 PROCEDURE — 84439 ASSAY OF FREE THYROXINE: CPT

## 2023-08-02 PROCEDURE — 83970 ASSAY OF PARATHORMONE: CPT

## 2023-08-02 PROCEDURE — 84443 ASSAY THYROID STIM HORMONE: CPT

## 2023-08-02 PROCEDURE — 82607 VITAMIN B-12: CPT

## 2023-08-02 PROCEDURE — 85027 COMPLETE CBC AUTOMATED: CPT

## 2023-08-02 PROCEDURE — 99000 SPECIMEN HANDLING OFFICE-LAB: CPT

## 2023-08-02 PROCEDURE — 82306 VITAMIN D 25 HYDROXY: CPT

## 2023-08-03 LAB — DEPRECATED CALCIDIOL+CALCIFEROL SERPL-MC: 51 UG/L (ref 20–75)

## 2023-08-08 LAB — TSI SER-ACNC: <1 TSI INDEX

## 2023-09-04 DIAGNOSIS — Z30.44 ENCOUNTER FOR SURVEILLANCE OF VAGINAL RING HORMONAL CONTRACEPTIVE DEVICE: ICD-10-CM

## 2023-09-05 RX ORDER — ETONOGESTREL AND ETHINYL ESTRADIOL .12; .015 MG/D; MG/D
RING VAGINAL
Qty: 3 EACH | Refills: 0 | Status: SHIPPED | OUTPATIENT
Start: 2023-09-05 | End: 2023-12-13

## 2023-09-05 NOTE — TELEPHONE ENCOUNTER
"Prescription approved per [facility] Refill Protocol.         Requested Prescriptions   Pending Prescriptions Disp Refills    ELURYNG 0.12-0.015 MG/24HR vaginal ring [Pharmacy Med Name: ELURYNG] 3 each 0     Sig: INSERT ONE RING VAGINALLY EVERY 21 DAYS THEN REMOVE FOR ONE WEEK THEN REPEAT WITH NEW RING       Contraceptives Protocol Passed - 9/4/2023  3:31 AM        Passed - Patient is not a current smoker if age is 35 or older        Passed - Recent (12 mo) or future (30 days) visit within the authorizing provider's specialty     Patient has had an office visit with the authorizing provider or a provider within the authorizing providers department within the previous 12 mos or has a future within next 30 days. See \"Patient Info\" tab in inbasket, or \"Choose Columns\" in Meds & Orders section of the refill encounter.              Passed - Medication is active on med list        Passed - No active pregnancy on record        Passed - No positive pregnancy test in past 12 months                 Subha Almaguer RN 09/05/23 5:52 PM   "

## 2023-09-11 ENCOUNTER — PATIENT OUTREACH (OUTPATIENT)
Dept: CARE COORDINATION | Facility: CLINIC | Age: 38
End: 2023-09-11
Payer: COMMERCIAL

## 2023-09-21 DIAGNOSIS — Z98.84 BARIATRIC SURGERY STATUS: ICD-10-CM

## 2023-09-21 DIAGNOSIS — K21.9 GERD WITHOUT ESOPHAGITIS: ICD-10-CM

## 2023-09-21 RX ORDER — OMEPRAZOLE 40 MG/1
40 CAPSULE, DELAYED RELEASE ORAL DAILY
Qty: 90 CAPSULE | Refills: 0 | Status: SHIPPED | OUTPATIENT
Start: 2023-09-21 | End: 2023-12-21

## 2023-09-21 NOTE — TELEPHONE ENCOUNTER
Pt last seen 7/6/23 for annual band assessment.  Is seeing surgeon 9/28/23 for band removal.  To RTC in a year for annual bariatric visit.  Took last pantoprazole today.  Still in need.  Will refill per protocol for 3 months as may not need if band removed.  Cynthia Zapata, MS, RD, RN

## 2023-09-25 ENCOUNTER — PATIENT OUTREACH (OUTPATIENT)
Dept: CARE COORDINATION | Facility: CLINIC | Age: 38
End: 2023-09-25
Payer: COMMERCIAL

## 2023-09-28 ENCOUNTER — OFFICE VISIT (OUTPATIENT)
Dept: SURGERY | Facility: CLINIC | Age: 38
End: 2023-09-28
Payer: COMMERCIAL

## 2023-09-28 VITALS
SYSTOLIC BLOOD PRESSURE: 116 MMHG | HEART RATE: 80 BPM | DIASTOLIC BLOOD PRESSURE: 80 MMHG | HEIGHT: 64 IN | RESPIRATION RATE: 16 BRPM | WEIGHT: 176 LBS | OXYGEN SATURATION: 98 % | BODY MASS INDEX: 30.05 KG/M2

## 2023-09-28 DIAGNOSIS — K21.9 CHRONIC GERD: Primary | ICD-10-CM

## 2023-09-28 DIAGNOSIS — Z98.84 HISTORY OF ADJUSTABLE GASTRIC BANDING: ICD-10-CM

## 2023-09-28 PROCEDURE — 99215 OFFICE O/P EST HI 40 MIN: CPT | Performed by: SURGERY

## 2023-10-04 NOTE — PROGRESS NOTES
"Dear Trudi Sauceda,      Referring provider:        No data to display              I was asked to see the patient regarding obesity by the referring provider above.    I had the pleasure of meeting with your patient Tram Bal in our weight loss surgery office.  This patient is a 38 year old female who underwent a laparoscopic placement of an adjustable gastric band in April 2011.  She comes in today to discuss issues regarding her gastric band.  This has not been utilized for weight loss for some time.  She has developed significant and intractable acid reflux disease and epigastric abdominal pain related to her band.  This was not much of an issue until the last year or so.  Since then she has optimized her utilization of acid suppression therapy but continues to have daily heartburn.  She is also been regurgitating waking up coughing due to acid.  Fluid was complete removed from her band over a year ago and despite this has continued to have issues.  Her current weight was 176 pounds and her current BMI is 30.21.  After surgery she states that she reached a lowest weight of around 165 pounds.  She has had improvement in many of her weight related medical conditions specifically prediabetes high cholesterol and back pain.  She at this point would however like to discuss the possibility of revisional surgery.     PREVIOUS WEIGHT LOSS ATTEMPTS:       No data to display                CO-MORBIDITIES OF OBESITY INCLUDE:       No data to display                VITALS:  /80   Pulse 80   Resp 16   Ht 1.626 m (5' 4\")   Wt 79.8 kg (176 lb)   SpO2 98%   BMI 30.21 kg/m      PE:  GENERAL: Alert and oriented x3. NAD  HEENT exam: Sclerae not icteric. Hearing good. Head normocephalic and atraumatic.   CARDIOVASCULAR: No JVD  RESPIRATORY: Breathing unlabored  GASTROINTESTINAL: Obese  LOWER EXTREMITIES: no deformities  MUSCULOSKELETAL: Normal gait, Moves all 4 extremities equal and strong  NEUROLOGIC: no " gross defect  SKIN: warm and dry to touch     In summary, she has overall done reasonably well with long-term weight loss after bariatric surgery.  In total of her weight still remains close to 60 pounds less than her maximum weight.  She however suffers daily with intractable acid reflux disease and epigastric abdominal pain.  She has gastroesophageal reflux disease with reflux esophagitis.    I discussed with her at length her management options.  Options include simple bandage removal otherwise I think her only other option would be revisional surgery with band removal and conversion to Blessing-en-Y bypass.  At present she would like to pursue the possibility of revisional surgery.  We will check with her insurance regarding coverage for revisional surgery and move forward as is appropriate.  If you have any questions regarding this evaluation please feel free to contact me.  Total time spent was 40 minutes with greater than 50% in face-to-face consultation.            Sincerely,    Darrel Zaragoza MD    Please route or send letter to:  Primary Care Provider (PCP) and Referring Provider

## 2023-10-05 ENCOUNTER — TELEPHONE (OUTPATIENT)
Dept: SURGERY | Facility: CLINIC | Age: 38
End: 2023-10-05
Payer: COMMERCIAL

## 2023-10-10 ENCOUNTER — LAB (OUTPATIENT)
Dept: LAB | Facility: CLINIC | Age: 38
End: 2023-10-10
Payer: COMMERCIAL

## 2023-10-10 DIAGNOSIS — E05.00 GRAVES DISEASE: ICD-10-CM

## 2023-10-10 LAB
T3 SERPL-MCNC: 148 NG/DL (ref 85–202)
T4 FREE SERPL-MCNC: 0.9 NG/DL (ref 0.9–1.7)
TSH SERPL DL<=0.005 MIU/L-ACNC: 1.57 UIU/ML (ref 0.3–4.2)

## 2023-10-10 PROCEDURE — 36415 COLL VENOUS BLD VENIPUNCTURE: CPT

## 2023-10-10 PROCEDURE — 84480 ASSAY TRIIODOTHYRONINE (T3): CPT

## 2023-10-10 PROCEDURE — 84439 ASSAY OF FREE THYROXINE: CPT

## 2023-10-10 PROCEDURE — 84443 ASSAY THYROID STIM HORMONE: CPT

## 2023-10-16 VITALS — HEIGHT: 64 IN | BODY MASS INDEX: 29.88 KG/M2 | WEIGHT: 175 LBS

## 2023-10-16 ASSESSMENT — SLEEP AND FATIGUE QUESTIONNAIRES
HOW LIKELY ARE YOU TO NOD OFF OR FALL ASLEEP WHILE WATCHING TV: WOULD NEVER DOZE
HOW LIKELY ARE YOU TO NOD OFF OR FALL ASLEEP WHILE SITTING AND TALKING TO SOMEONE: WOULD NEVER DOZE
HOW LIKELY ARE YOU TO NOD OFF OR FALL ASLEEP WHEN YOU ARE A PASSENGER IN A CAR FOR AN HOUR WITHOUT A BREAK: HIGH CHANCE OF DOZING
HOW LIKELY ARE YOU TO NOD OFF OR FALL ASLEEP WHILE LYING DOWN TO REST IN THE AFTERNOON WHEN CIRCUMSTANCES PERMIT: HIGH CHANCE OF DOZING
HOW LIKELY ARE YOU TO NOD OFF OR FALL ASLEEP WHILE SITTING INACTIVE IN A PUBLIC PLACE: WOULD NEVER DOZE
HOW LIKELY ARE YOU TO NOD OFF OR FALL ASLEEP WHILE SITTING AND READING: MODERATE CHANCE OF DOZING
HOW LIKELY ARE YOU TO NOD OFF OR FALL ASLEEP IN A CAR, WHILE STOPPED FOR A FEW MINUTES IN TRAFFIC: WOULD NEVER DOZE
HOW LIKELY ARE YOU TO NOD OFF OR FALL ASLEEP WHILE SITTING QUIETLY AFTER LUNCH WITHOUT ALCOHOL: WOULD NEVER DOZE

## 2023-10-17 ENCOUNTER — VIRTUAL VISIT (OUTPATIENT)
Dept: SURGERY | Facility: CLINIC | Age: 38
End: 2023-10-17
Payer: COMMERCIAL

## 2023-10-17 DIAGNOSIS — Z98.84 LAP-BAND SURGERY STATUS: ICD-10-CM

## 2023-10-17 DIAGNOSIS — E66.811 CLASS 1 OBESITY DUE TO EXCESS CALORIES WITHOUT SERIOUS COMORBIDITY WITH BODY MASS INDEX (BMI) OF 30.0 TO 30.9 IN ADULT: Primary | ICD-10-CM

## 2023-10-17 DIAGNOSIS — K21.9 GERD WITHOUT ESOPHAGITIS: ICD-10-CM

## 2023-10-17 DIAGNOSIS — E66.09 CLASS 1 OBESITY DUE TO EXCESS CALORIES WITHOUT SERIOUS COMORBIDITY WITH BODY MASS INDEX (BMI) OF 30.0 TO 30.9 IN ADULT: Primary | ICD-10-CM

## 2023-10-17 PROCEDURE — 99214 OFFICE O/P EST MOD 30 MIN: CPT | Mod: VID | Performed by: PHYSICIAN ASSISTANT

## 2023-10-17 NOTE — LETTER
October 17, 2023      Tram Bal  [address]  [facility] [place] [facility] [address]          Bariatric Task List      Lose 5 lbs prior to surgery.  Goal Weight: 171 lbs    Have preoperative laboratory tests drawn.     Psychological Evaluation with MMPI and clearance for weight loss surgery.    Achieve clearance from dietitian to see surgeon.    In Person Bariatric Education Visit    Online Seminar      Sincerely,      Nikita Winter PA-C

## 2023-10-17 NOTE — PROGRESS NOTES
"Tram is a 38 year old who is being evaluated via a billable video visit.      The patient has been notified of following:     \"This video visit will be conducted via a call between you and your physician/provider. We have found that certain health care needs can be provided without the need for an in-person physical exam.  This service lets us provide the care you need with a video conversation.  If a prescription is necessary we can send it directly to your pharmacy.  If lab work is needed we can place an order for that and you can then stop by our lab to have the test done at a later time.    Video visits are billed at different rates depending on your insurance coverage.  Please reach out to your insurance provider with any questions.    If during the course of the call the physician/provider feels a video visit is not appropriate, you will not be charged for this service.\"    Patient has given verbal consent for Video visit? Yes    How would you like to obtain your AVS? MyChart    If the video visit is dropped, the invitation should be resent by:     Will anyone else be joining your video visit? No    I    Video-Visit Details    Type of service:  Video Visit    Video Start Time: 3:34 PM    Video End Time: 3:55    Originating Location (pt. Location): [facility]    Distant Location (provider location):  [facility] [place]     Platform used for Video Visit: [employer]          [facility] Bariatric Revision al Surgery Consultation Note      2023    RE: Tram Bal  MR#: [id number]  : 1985      Referring provider:       10/16/2023     2:25 PM   --   Who referred you Dr soto       Chief Complaint/Reason for visit: evaluation for possible weight loss surgery    Dear Trudi Guevara, DEON CNP (General),    I had the pleasure of seeing your patient, Tram Bal, to evaluate her obesity and consider her for possible weight loss surgery. As you know, Tram Bal " "is 38 year old.  She has a height of 5' 4\", a weight of 175 lbs 0 oz, and calculated Body mass index is 30.04 kg/m .    Patient has been seen for adjustments of lap band placed by Dr Li 4/14/2011.  Has had signficant GERD and after consulting with Dr Zaragoza has elected to have a revisional surgery with band removal and conversion to Blessing-en-Y bypass     Reached out to insurance      HISTORY OF PRESENT ILLNESS:      10/16/2023     2:25 PM   Weight Loss History Reviewed with Patient   How long have you been overweight? Following one or more pregnancies   What is the most that you have ever weighed 230   What is the most weight you have lost? 70   I have tried the following methods to lose weight Watching portions or calories    Exercise    Atkins type diet (low carb/high protein)    Weight Loss Surgery   I have tried the following weight loss medications? (Check all that apply) Topamax/Topiramate    Phentermine/Adipex-p/Suprenza   Please select the type of weight loss surgery you had (select all that apply) adjustable gastric band / LapBand or Realize Band       CO-MORBIDITIES OF OBESITY INCLUDE:      10/16/2023     2:25 PM   --   I have the following health issues associated with obesity GERD (Reflux)       PAST MEDICAL HISTORY:  Past Medical History:   Diagnosis Date    Abnormal Pap smear of cervix 2004    2005, 2006, 2010, 2016, 2017    Cervical high risk HPV (human papillomavirus) test positive 2017 2018, 2020    Depressive disorder 2016    Displacement of lumbar intervertebral disc 05/15/2008    Esophageal reflux     Hx of colposcopy with cervical biopsy 05/22/2017 5/22/17:[place]- Benign.     Thyroid disease 2022       PAST SURGICAL HISTORY:  Past Surgical History:   Procedure Laterality Date    COLPOSCOPY,LOOP ELECTRD CERVIX EXCIS  08/11/2005    JARETH III    GI SURGERY  04/14/11    LAP BANDING for obesity, pre-DM    ZZC NONSPECIFIC PROCEDURE  1999    Oral surgery     No personal or family history of blood " clots or anesthesia concerns      FAMILY HISTORY:   Family History   Problem Relation Age of Onset    Thyroid Disease Mother     Unknown/Adopted Mother     Hyperlipidemia Father     Hypertension Paternal Grandmother     Hyperlipidemia Paternal Grandmother     Diabetes Paternal Aunt     Hypertension Paternal Aunt     Lipids Paternal Aunt     Thyroid Disease Maternal Half-Brother     Unknown/Adopted No family hx of         unaware of maternal grandparents history as pt mom was adopted    Breast Cancer No family hx of     Cancer - colorectal No family hx of     Gynecology No family hx of         no ovarian cancer    Coronary Artery Disease No family hx of     Cerebrovascular Disease No family hx of     Thyroid Disease No family hx of     Osteoporosis No family hx of        SOCIAL HISTORY:       10/16/2023     2:25 PM   Social History Questions Reviewed With Patient   Which best describes your employment status (select all that apply) I work part-time    I work alternate shifts   If you work, what is your occupation?    Which best describes your marital status in a relationship   Who do you have in your support network that can be available to help you for the first 2 weeks after surgery? Boyfriend and daughters   Who can you count on for support throughout your weight loss surgery journey? Everyone       HABITS:      10/16/2023     2:25 PM   --   How often do you drink alcohol? 2-4 times a month   If you do drink alcohol, how many drinks might you have in a day? (one drink = 5 oz. wine, 1 can/bottle of beer, 1 shot liquor) 1 or 2   Do you currently use any of the following Nicotine products? No   Have you ever used any of the following nicotine products? Cigarettes   If you previously used any of these products, what year did you quit? 2009   Have you or are you currently using street drugs or prescription strength medication for which you do not have a prescription for? No   Do you have a history of chemical  dependency (alcohol or drug abuse)? No       PSYCHOLOGICAL HISTORY:       10/16/2023     2:25 PM   Psychological History Reviewed With Patient   Have you ever attempted suicide? Never.   Have you had thoughts of suicide in the past year? No   Have you ever been hospitalized for mental illness or a suicide attempt? Never.   Do you have a history of chronic pain? No   Have you ever been diagnosed with fibromyalgia? No   Are you currently being treated for any of the following? (select all that apply) Depression    Anxiety   Are you currently seeing a therapist or counselor? No   Are you currently seeing a psychiatrist? No       ROS:      10/16/2023     2:25 PM   --   Skin None of the above   HEENT Headaches    None of these   Musculoskeletal Back pain   Cardiovascular Shortness of breath with activity   Pulmonary Shortness of breath with activity    Excessively sleep during the day   Gastrointestinal Heartburn    Reflux   Genitourinary None of the above   Hematological None of the above   Neurological None of the above   Female only Birth control       EATING BEHAVIORS:      10/16/2023     2:25 PM   --   Have you or anyone else thought that you had an eating disorder? No   Do you currently binge eat (eat a large amount of food in a short time)? No   Are you an emotional eater? No   Do you get up to eat after falling asleep? No   Can you afford 3 meals a day? Yes   Can you afford 50-60 dollars a month for vitamins? Yes       EXERCISE:      10/16/2023     2:25 PM   --   How often do you exercise? 1 to 2 times per week   What is the duration of your exercise (in minutes)? 30 Minutes   What types of exercise do you do? walking    gym membership   What keeps you from being more active? Lack of Time    Too tired       MEDICATIONS:  Current Outpatient Medications   Medication Sig Dispense Refill    acyclovir (ZOVIRAX) 800 MG tablet Take 1 tablet (800 mg) by mouth 3 times daily 21 tablet 2    etonogestrel-ethinyl estradiol  "(ELURYNG) 0.12-0.015 MG/24HR vaginal ring INSERT ONE RING VAGINALLY EVERY 21 DAYS THEN REMOVE FOR ONE WEEK THEN REPEAT WITH NEW RING 3 each 0    FLUoxetine (PROZAC) 40 MG capsule Take 1 capsule (40 mg) by mouth daily 90 capsule 3    hydrOXYzine (ATARAX) 25 MG tablet Take 0.5-1 tablets (12.5-25 mg) by mouth 3 times daily as needed for anxiety 30 tablet 1    Multiple Vitamins-Iron TABS Take by mouth daily       omeprazole (PRILOSEC) 40 MG DR capsule TAKE 1 CAPSULE(40 MG) BY MOUTH DAILY 90 capsule 0    propranolol (INDERAL) 10 MG tablet Take 1 tablet (10 mg) by mouth 2 times daily 60 tablet 3       ALLERGIES:  Allergies   Allergen Reactions    Famvir [Famciclovir] Hives       LABS/IMAGING/MEDICAL RECORDS REVIEW: [facility]    PHYSICAL EXAM:  Ht 5' 4\" (1.626 m)   Wt 175 lb (79.4 kg)   BMI 30.04 kg/m    GENERAL: Healthy, alert and no distress  EYES: Eyes grossly normal to inspection.  No discharge or erythema, or obvious scleral/conjunctival abnormalities.  RESP: No audible wheeze, cough, or visible cyanosis.  No visible retractions or increased work of breathing.    SKIN: Visible skin clear. No significant rash, abnormal pigmentation or lesions.  NEURO: Cranial nerves grossly intact.  Mentation and speech appropriate for age.  PSYCH: Mentation appears normal, affect normal/bright, judgement and insight intact, normal speech and appearance well-groomed.      In summary, Tram Bal has Class I obesity with a Body mass index is 30.04 kg/m . and the comorbidities stated above. She completed an informational seminar and is a candidate for the  revisional surgery with band removal and conversion to Blessing-en-Y bypass   Task list below:      Lose 5 lbs prior to surgery.  Goal Weight: 171 lbs - taken from recent surgeon visit  Have preoperative laboratory tests drawn.   Psychological Evaluation with MMPI and clearance for weight loss surgery.  Achieve clearance from dietitian to see surgeon.  In Person Bariatric Education " Visit  Online Seminar      Patient will start working on task list above as well as watch the online seminar. Will schedule an IN PERSON visit with one of the providers to discuss the gastric bypass along with risks and benefits and necessary lifestyle modifications. A comprehensive physical exam will be performed.       Sincerely,     Nikita Winter PA-C      32 minutes spent on the date of the encounter doing chart review, review of test results, patient visit and documentation

## 2023-10-17 NOTE — PATIENT INSTRUCTIONS
"Nice to talk with you today! Thank you for allowing me the privilege of caring for you.   We hope we provided you with the excellent service you deserve.     To ensure the quality of our services you may receive a patient satisfaction survey from an independent monitoring company.  The greatest compliment you can give is \"Likely to Recommend\"      Below is our plan we discussed.-  GABRIELLA Shelton        Please view our Weight Loss Surgery Seminar at the following website:     [email or web address]       Please call [phone] and schedule an IN PERSON Pre Op visit with Nikita Winter PA-C in 3 months.  If you need to reach me sooner you can do so by calling [phone].    Have a great day!                                                     [facility]                                                                       Psychologist List                                                     Bariatric Psychological Assessments    When you call the psychologist's office,   Please specify you need a  screening psychological assessment for bariatric surgery.    This includes: a bariatric surgery evaluation and MMPI.  The report should be mailed /faxed to our office at [phone].      Psychologists are usually booked several weeks in advance; take this into consideration when you call them.   Please check with your insurance to see that the provider is covered.      [facility] Providers:  [facility] (Sites located throughout [facility])  Cherry Wade, [facility] [place], [place] and [place] [phone]   Wayne Carpenter, PhD, LP [address] [place] [address]  Appointment scheduling only  All other questions   [phone]     [facility]    Amaris Martinez, PhD, LP  Paty Mondragon, PhD, LP   Kathi Chery PhD (cannot accept Medicare)    [facility]  "     [address] [facility], [place] [address]  [address], [place] [address]  [address] [facility], [place], [place]          [phone]           Outside Providers:  [facility]--  Jeanne Mcnally, MS, [facility], LADC [address] [facility], [address] [place] [address] [phone]   [facility]--  [address], [place] [address] [address] [facility] [address] [place] [address]  [phone]   Ivory Robison PsyD, LP  ([facility] not in network) [address] [facility], [address] [place] [address] [phone]   Osvaldo Hammond, PhD, [facility]  [address] [place] [address] [phone]   Digna Elliott PsyD,LP     [address] [place] [address] [phone]     Ki Hawkins, PhD,  [address] [place] [address] [phone]   [facility], [facility]   Crystal Raygoza PsyD, [facility],      [address]  [address]    [address] [facility]  [address] [place] [address]  &  [place], [place] [id number] [phone]      [facility]    Ki Shelton, PhD,      Oscar Greenberg, [place],   [address] [place] [address] [phone]     Loren Cabrera, PhD,  [address] [facility] , [place] [address], [phone]      Mitali Olvera PsyD, LP [address] [place], [place] [id number] [phone]     Margie Tejada, [place], LP    [address] [place], [place]  [id number] [phone]      [facility]  Billie [place], [place] [address] [place] [phone]   Paul Cheng PsyD,ODILIA,ABPP [address] [place] [address] [phone]     Johana Anglin PsyD, ODILIA   [facility] [address]  [facility] [place] [address] [phone]     Francoise Potter PsyD    [address] [place] [address], [place], [place] [id number] [phone]     Dez Kapoor, Phd [address] [address] [phone]      Darrel  [place] Shankar, ODILIA [address] [place] [address] [phone]      Yesenia Soares PsyD, ODILIA [address] [place] [address] [phone]        Updated 8/3/23 DZ

## 2023-10-23 ENCOUNTER — VIRTUAL VISIT (OUTPATIENT)
Dept: SURGERY | Facility: CLINIC | Age: 38
End: 2023-10-23
Payer: COMMERCIAL

## 2023-11-19 ENCOUNTER — HEALTH MAINTENANCE LETTER (OUTPATIENT)
Age: 38
End: 2023-11-19

## 2023-12-13 DIAGNOSIS — Z30.44 ENCOUNTER FOR SURVEILLANCE OF VAGINAL RING HORMONAL CONTRACEPTIVE DEVICE: ICD-10-CM

## 2023-12-13 RX ORDER — ETONOGESTREL AND ETHINYL ESTRADIOL .12; .015 MG/D; MG/D
RING VAGINAL
Qty: 3 EACH | Refills: 0 | Status: SHIPPED | OUTPATIENT
Start: 2023-12-13 | End: 2024-01-16

## 2023-12-13 NOTE — TELEPHONE ENCOUNTER
Patient achieved a 15% reduction in IOP from pretreatment levels or a plan is in place to achieve this goal. Prescription approved per [facility] Refill Protocol     Jazlyn Fallon     RN MSN

## 2023-12-21 ENCOUNTER — LAB (OUTPATIENT)
Dept: LAB | Facility: CLINIC | Age: 38
End: 2023-12-21
Payer: COMMERCIAL

## 2023-12-21 ENCOUNTER — MYC REFILL (OUTPATIENT)
Dept: SURGERY | Facility: CLINIC | Age: 38
End: 2023-12-21

## 2023-12-21 DIAGNOSIS — E05.00 GRAVES DISEASE: ICD-10-CM

## 2023-12-21 DIAGNOSIS — K21.9 GERD WITHOUT ESOPHAGITIS: ICD-10-CM

## 2023-12-21 DIAGNOSIS — Z98.84 BARIATRIC SURGERY STATUS: ICD-10-CM

## 2023-12-21 LAB
ALBUMIN SERPL BCG-MCNC: 4.2 G/DL (ref 3.5–5.2)
ALP SERPL-CCNC: 88 U/L (ref 40–150)
ALT SERPL W P-5'-P-CCNC: 25 U/L (ref 0–50)
ANION GAP SERPL CALCULATED.3IONS-SCNC: 14 MMOL/L (ref 7–15)
AST SERPL W P-5'-P-CCNC: 21 U/L (ref 0–45)
BILIRUB SERPL-MCNC: 0.3 MG/DL
BUN SERPL-MCNC: 10.1 MG/DL (ref 6–20)
CALCIUM SERPL-MCNC: 9.1 MG/DL (ref 8.6–10)
CHLORIDE SERPL-SCNC: 103 MMOL/L (ref 98–107)
CREAT SERPL-MCNC: 0.64 MG/DL (ref 0.51–0.95)
DEPRECATED HCO3 PLAS-SCNC: 20 MMOL/L (ref 22–29)
EGFRCR SERPLBLD CKD-EPI 2021: >90 ML/MIN/1.73M2
GLUCOSE SERPL-MCNC: 117 MG/DL (ref 70–99)
HBA1C MFR BLD: 5.7 %
POTASSIUM SERPL-SCNC: 4 MMOL/L (ref 3.4–5.3)
PROT SERPL-MCNC: 7.4 G/DL (ref 6.4–8.3)
SODIUM SERPL-SCNC: 137 MMOL/L (ref 135–145)
T4 FREE SERPL-MCNC: 1.03 NG/DL (ref 0.9–1.7)
TSH SERPL DL<=0.005 MIU/L-ACNC: 0.82 UIU/ML (ref 0.3–4.2)

## 2023-12-21 PROCEDURE — 84480 ASSAY TRIIODOTHYRONINE (T3): CPT

## 2023-12-21 PROCEDURE — 84443 ASSAY THYROID STIM HORMONE: CPT

## 2023-12-21 PROCEDURE — 80053 COMPREHEN METABOLIC PANEL: CPT

## 2023-12-21 PROCEDURE — 84439 ASSAY OF FREE THYROXINE: CPT

## 2023-12-21 PROCEDURE — 83036 HEMOGLOBIN GLYCOSYLATED A1C: CPT

## 2023-12-21 PROCEDURE — 36415 COLL VENOUS BLD VENIPUNCTURE: CPT

## 2023-12-21 RX ORDER — OMEPRAZOLE 40 MG/1
40 CAPSULE, DELAYED RELEASE ORAL DAILY
Qty: 90 CAPSULE | Refills: 0 | Status: SHIPPED | OUTPATIENT
Start: 2023-12-21 | End: 2024-03-15

## 2023-12-22 ENCOUNTER — VIRTUAL VISIT (OUTPATIENT)
Dept: SURGERY | Facility: CLINIC | Age: 38
End: 2023-12-22
Payer: COMMERCIAL

## 2023-12-22 VITALS — WEIGHT: 175 LBS | BODY MASS INDEX: 29.88 KG/M2 | HEIGHT: 64 IN

## 2023-12-22 DIAGNOSIS — Z98.84 BARIATRIC SURGERY STATUS: Primary | ICD-10-CM

## 2023-12-22 DIAGNOSIS — Z98.84 LAP-BAND SURGERY STATUS: ICD-10-CM

## 2023-12-22 DIAGNOSIS — E66.811 CLASS 1 OBESITY DUE TO EXCESS CALORIES WITHOUT SERIOUS COMORBIDITY WITH BODY MASS INDEX (BMI) OF 30.0 TO 30.9 IN ADULT: ICD-10-CM

## 2023-12-22 DIAGNOSIS — E66.09 CLASS 1 OBESITY DUE TO EXCESS CALORIES WITHOUT SERIOUS COMORBIDITY WITH BODY MASS INDEX (BMI) OF 30.0 TO 30.9 IN ADULT: ICD-10-CM

## 2023-12-22 LAB — T3 SERPL-MCNC: 168 NG/DL (ref 85–202)

## 2023-12-22 PROCEDURE — 97802 MEDICAL NUTRITION INDIV IN: CPT | Mod: 95

## 2023-12-22 NOTE — PROGRESS NOTES
"Tram is a 38 year old who is being evaluated via a billable video visit.      How would you like to obtain your AVS? MyChart  If the video visit is dropped, the invitation should be resent by: Text to cell phone: [phone]  Will anyone else be joining your video visit? No              Video-Visit Details    Type of service:  Video Visit   Video Start Time:  1:00pm  Video End Time: 1:33pm    Originating Location (pt. Location): work    Distant Location (provider location):  Off-site  Platform used for Video Visit: [facility]     New Bariatric Nutrition Consultation Note    Reason For Visit: Nutrition Assessment    Tram Bal is a 38 year old presenting today for new bariatric nutrition consult.  Pt is interested in laparoscopic revision from band to gastric bypass.  Patient is accompanied by self.        10/16/2023     2:25 PM   Support System Reviewed With Patient   Who do you have in your support network that can be available to help you for the first 2 weeks after surgery? Boyfriend and daughters   Who can you count on for support throughout your weight loss surgery journey? Everyone       ANTHROPOMETRICS:   Estimated body mass index is 30.04 kg/m  as calculated from the following:    Height as of this encounter: 1.626 m (5' 4\").    Weight as of this encounter: 79.4 kg (175 lb).    Required weight loss goal pre-op: 5 lbs from surgeon consult weight (goal weight 171 lbs or less before surgery)        10/16/2023     2:25 PM   --   I have tried the following methods to lose weight Watching portions or calories    Exercise    Atkins type diet (low carb/high protein)    Weight Loss Surgery           10/16/2023     2:25 PM   Weight Loss Questions Reviewed With Patient   How long have you been overweight? Following one or more pregnancies       SUPPLEMENT INFORMATION:  Women's Multivitamin  Vitamin D [unsure of dose]  Plans to restart Vitamin B12 per recent labwork    NUTRITION HISTORY:      10/16/2023     2:25 " PM   Recall Diet Questions Reviewed With Patient   Describe what you typically consume for breakfast (typical or most recent) Eggs and sausage hashbrowns   Describe what you typically consume for lunch (typical or most recent) Protein shake or leftovers   Describe what you typically consume for supper (typical or most recent) Turkey with mash potatoes   How many ounces of water, or other low calorie drinks, do you drink daily (8 oz=1 glass)? 32 oz   How many ounces of caffeine (coffee, tea, pop) do you drink daily (8 oz=1 glass)? 8 oz   How many ounces of carbonated (pop, beer, sparkling water) drinks do you drinky daily (8 oz=1 glass)? 0 oz   How many ounces of juice, pop, sweet tea, sports drinks, protein drinks, other sweetened drinks, do you drink daily (8 oz=1 glass)? 8 oz   How many ounces of milk do you drink daily (8 oz=1 glass) 8 oz   Please indicate the type of milk 2%   How often do you drink alcohol? 2-4 times a month   If you do drink alcohol, how many drinks might you have in a day? (one drink = 5 oz. wine, 1 can/bottle of beer, 1 shot liquor) 1 or 2           10/16/2023     2:25 PM   Eating Habits   What foods do you crave? None       ADDITIONAL INFORMATION:  Pt s/p lap band with Dr. Li in 2011. Overall fairly successful with weight but struggling with severe GERD. Pt familiar with lifestyle guidelines; appropriate questions on where major differences of band vs RNY will be.         10/16/2023     2:25 PM   Dining Out History Reviewed With Patient   How often do you dine out? A couple of times a week.   Where do you dine out? (select all that apply) sit-down restaurants   What types of food do you order when you dine out? Murphys/fish           10/16/2023     2:25 PM   Physical Activity Reviewed With Patient   How often do you exercise? 1 to 2 times per week   What is the duration of your exercise (in minutes)? 30 Minutes   What types of exercise do you do? walking    gym membership   What keeps you  from being more active? Lack of Time    Too tired       NUTRITION DIAGNOSIS:  Obesity r/t long history of self-monitoring deficit and excessive energy intake aeb BMI >30 kg/m2.    INTERVENTION:  Intervention Provided/Education Provided on post-op diet guidelines, vitamins/minerals essential post-operatively, GI anatomy of bariatric surgeries, ways to help prepare for post-op diet guidelines pre-operatively, portion/calorie-control, mindful eating.  Provided pt with list of goals RD contact information.          10/16/2023     2:25 PM   Questions Reviewed With Patient   How ready are you to make changes regarding your weight? Number 1 = Not ready at all to make changes up to 10 = very ready. 10   How confident are you that you can change? 1 = Not confident that you will be successful making changes up to 10 = very confident. 10       Patient Understanding: good  Expected Compliance: good    GOALS:  Start Calcium per guidelines  Switch to decaf coffee  Have a protein shake regularly      Follow-Up:   Recommend 2-3 follow up visits to assist with lifestyle changes or per insurance.      Time spent with patient: 33 minutes.    Jessica Pierce RD, LD  Clinical Dietitian     DURAN PEREZ      Physical Exam

## 2023-12-22 NOTE — PATIENT INSTRUCTIONS
Shahid Ugalde!    It was great chatting with you today! Here are some links to the information we discussed:         Vitamins and Minerals  [email or web address]     Diet Guidelines:  [email or web address]     Your Stage 1 Diet: Clear Liquids  [email or web address]     Your Stage 2 Diet: Low-fat Full Liquids  [email or web address]     Your Stage 3 Diet: Pureed Foods  [email or web address]     Your Stage 4 Diet: Soft Foods  [email or web address]    Your Stage 5 Diet: Regular Foods  [email or web address]       Here are the goals we discussed for this first month:    1. Begin taking a daily multivitamin , calcium, and vitamin D supplement   - The doses/requirements for these are in the vitamin/mineral handout link above.     2. Switch to decaf coffee    3. Have a protein shake regularly  - a daily supplement would be a great way to increase overall protein and prepare your body for surgery             Your next visit can be scheduled via the call center at  and should be in one month. Have a great week and let us know if any questions/concerns pop up!         Jessica Pierce RD, LD  Clinical Dietitian

## 2024-01-15 ASSESSMENT — ENCOUNTER SYMPTOMS
HEMATURIA: 0
NERVOUS/ANXIOUS: 0
DYSURIA: 0
BREAST MASS: 0
HEARTBURN: 0
PARESTHESIAS: 0
CONSTIPATION: 0
COUGH: 0
ARTHRALGIAS: 0
FEVER: 0
JOINT SWELLING: 0
HEMATOCHEZIA: 0
FREQUENCY: 0
WEAKNESS: 0
CHILLS: 0
SORE THROAT: 0
EYE PAIN: 0
HEADACHES: 0
DIARRHEA: 0
PALPITATIONS: 0
SHORTNESS OF BREATH: 0
DIZZINESS: 0
MYALGIAS: 1
ABDOMINAL PAIN: 0
NAUSEA: 0

## 2024-01-15 ASSESSMENT — PATIENT HEALTH QUESTIONNAIRE - PHQ9
SUM OF ALL RESPONSES TO PHQ QUESTIONS 1-9: 11
10. IF YOU CHECKED OFF ANY PROBLEMS, HOW DIFFICULT HAVE THESE PROBLEMS MADE IT FOR YOU TO DO YOUR WORK, TAKE CARE OF THINGS AT HOME, OR GET ALONG WITH OTHER PEOPLE: SOMEWHAT DIFFICULT
SUM OF ALL RESPONSES TO PHQ QUESTIONS 1-9: 11

## 2024-01-16 ENCOUNTER — OFFICE VISIT (OUTPATIENT)
Dept: FAMILY MEDICINE | Facility: CLINIC | Age: 39
End: 2024-01-16
Payer: COMMERCIAL

## 2024-01-16 ENCOUNTER — OFFICE VISIT (OUTPATIENT)
Dept: SURGERY | Facility: CLINIC | Age: 39
End: 2024-01-16
Payer: COMMERCIAL

## 2024-01-16 VITALS
TEMPERATURE: 99.1 F | HEART RATE: 90 BPM | RESPIRATION RATE: 16 BRPM | WEIGHT: 170 LBS | DIASTOLIC BLOOD PRESSURE: 72 MMHG | HEIGHT: 64 IN | BODY MASS INDEX: 29.02 KG/M2 | SYSTOLIC BLOOD PRESSURE: 122 MMHG | OXYGEN SATURATION: 98 %

## 2024-01-16 VITALS
WEIGHT: 170.5 LBS | SYSTOLIC BLOOD PRESSURE: 112 MMHG | BODY MASS INDEX: 29.11 KG/M2 | HEART RATE: 80 BPM | DIASTOLIC BLOOD PRESSURE: 80 MMHG | OXYGEN SATURATION: 100 % | HEIGHT: 64 IN

## 2024-01-16 DIAGNOSIS — Z00.00 ROUTINE GENERAL MEDICAL EXAMINATION AT A HEALTH CARE FACILITY: Primary | ICD-10-CM

## 2024-01-16 DIAGNOSIS — B36.0 TINEA VERSICOLOR: ICD-10-CM

## 2024-01-16 DIAGNOSIS — Z12.4 CERVICAL CANCER SCREENING: ICD-10-CM

## 2024-01-16 DIAGNOSIS — F41.1 GENERALIZED ANXIETY DISORDER: ICD-10-CM

## 2024-01-16 DIAGNOSIS — E05.00 GRAVES DISEASE: ICD-10-CM

## 2024-01-16 DIAGNOSIS — Z98.84 LAP-BAND SURGERY STATUS: ICD-10-CM

## 2024-01-16 DIAGNOSIS — F32.1 MODERATE MAJOR DEPRESSION (H): ICD-10-CM

## 2024-01-16 DIAGNOSIS — M54.2 CERVICALGIA: ICD-10-CM

## 2024-01-16 DIAGNOSIS — Z98.84 BARIATRIC SURGERY STATUS: Primary | ICD-10-CM

## 2024-01-16 DIAGNOSIS — E66.3 OVERWEIGHT WITH BODY MASS INDEX (BMI) OF 29 TO 29.9 IN ADULT: ICD-10-CM

## 2024-01-16 DIAGNOSIS — K21.9 GERD WITHOUT ESOPHAGITIS: ICD-10-CM

## 2024-01-16 DIAGNOSIS — Z30.44 ENCOUNTER FOR SURVEILLANCE OF VAGINAL RING HORMONAL CONTRACEPTIVE DEVICE: ICD-10-CM

## 2024-01-16 PROCEDURE — 99213 OFFICE O/P EST LOW 20 MIN: CPT | Performed by: PHYSICIAN ASSISTANT

## 2024-01-16 PROCEDURE — G0145 SCR C/V CYTO,THINLAYER,RESCR: HCPCS | Performed by: NURSE PRACTITIONER

## 2024-01-16 PROCEDURE — 99395 PREV VISIT EST AGE 18-39: CPT | Performed by: NURSE PRACTITIONER

## 2024-01-16 PROCEDURE — 99214 OFFICE O/P EST MOD 30 MIN: CPT | Mod: 25 | Performed by: NURSE PRACTITIONER

## 2024-01-16 PROCEDURE — 87624 HPV HI-RISK TYP POOLED RSLT: CPT | Performed by: NURSE PRACTITIONER

## 2024-01-16 RX ORDER — PROPRANOLOL HYDROCHLORIDE 10 MG/1
10 TABLET ORAL 2 TIMES DAILY
Qty: 60 TABLET | Refills: 5 | Status: SHIPPED | OUTPATIENT
Start: 2024-01-16 | End: 2024-07-22

## 2024-01-16 RX ORDER — ETONOGESTREL AND ETHINYL ESTRADIOL VAGINAL RING .015; .12 MG/D; MG/D
RING VAGINAL
Qty: 3 EACH | Refills: 3 | Status: SHIPPED | OUTPATIENT
Start: 2024-01-16

## 2024-01-16 RX ORDER — PREDNISONE 20 MG/1
20 TABLET ORAL 2 TIMES DAILY
Qty: 10 TABLET | Refills: 0 | Status: SHIPPED | OUTPATIENT
Start: 2024-01-16 | End: 2024-01-21

## 2024-01-16 RX ORDER — CLOTRIMAZOLE 1 %
CREAM (GRAM) TOPICAL 2 TIMES DAILY
Qty: 60 G | Refills: 1 | Status: SHIPPED | OUTPATIENT
Start: 2024-01-16

## 2024-01-16 RX ORDER — FLUOXETINE 40 MG/1
40 CAPSULE ORAL DAILY
Qty: 90 CAPSULE | Refills: 3 | Status: SHIPPED | OUTPATIENT
Start: 2024-01-16

## 2024-01-16 ASSESSMENT — PAIN SCALES - GENERAL: PAINLEVEL: MODERATE PAIN (4)

## 2024-01-16 ASSESSMENT — ANXIETY QUESTIONNAIRES
1. FEELING NERVOUS, ANXIOUS, OR ON EDGE: SEVERAL DAYS
2. NOT BEING ABLE TO STOP OR CONTROL WORRYING: NOT AT ALL
7. FEELING AFRAID AS IF SOMETHING AWFUL MIGHT HAPPEN: NOT AT ALL
3. WORRYING TOO MUCH ABOUT DIFFERENT THINGS: SEVERAL DAYS
6. BECOMING EASILY ANNOYED OR IRRITABLE: SEVERAL DAYS
5. BEING SO RESTLESS THAT IT IS HARD TO SIT STILL: SEVERAL DAYS
GAD7 TOTAL SCORE: 5
GAD7 TOTAL SCORE: 5

## 2024-01-16 ASSESSMENT — PATIENT HEALTH QUESTIONNAIRE - PHQ9: 5. POOR APPETITE OR OVEREATING: SEVERAL DAYS

## 2024-01-16 NOTE — PATIENT INSTRUCTIONS
Nice to meet you today.  Below is our plan we discussed.-  Loyda Caicedo PA-C   Bariatric Task List  Psychological Evaluation with MMPI and clearance for weight loss surgery.- Call [phone] to schedule. This is for Dr. Carpenter but refer to packet for full possible list  Lose 5 lbs prior to surgery.  Goal Weight: 171 lbs - taken from recent surgeon visit completed weight is 170.5 lbs today  Have preoperative laboratory tests drawn. completed  Psychological Evaluation with MMPI and clearance for weight loss surgery. Needed - see number above and packet for schedule options  Achieve clearance from dietitian to see surgeon. needed  In Person Bariatric Education Visit due today  Online Seminar to watch: Please view our Weight Loss Surgery Seminar at the following website:     [email or web address]     _______________________________________  Road to Surgery:   1. Complete the above tasklist  2. Following tasklist completion, see the surgeon, go over your surgery, and sign consents  3. File will be sent for insurance approval  4.Once approved, our  will call to set up your surgery  (It usually takes 4-6 months to get to surgery from your initial consult)  After Surgery:   Remember, Obesity is a chronic disease, At some point weight gain will occur and hunger may return.   Follow up is important to keep your weight off and your vitamin levels up.  Labs will be monitored every 3 months for the first year and yearly thereafter.  Vitamin supplementation is a lifelong. You will take:  2 Multivitamins containing 18mg of iron  B-12 daily or by injection monthly  Vitamin D3 5000 IU daily   Calcium citrate 2 daily  Thiamine 100 mg weekly   Iron supplement once daily if you are a menstruating female  Prevent ulcers by avoiding tobacco and anti-inflammatories (NSAIDS) forever.  NSAIDS examples: Aspirin, Ibuprofen, Naproxen. Tylenol is fine.    Alcohol affects you  differently. There is a 10% increase of Alcohol Use Disorder in patients with bariatric surgery.   If you have even ONE drink DO NOT DRIVE.

## 2024-01-16 NOTE — PATIENT INSTRUCTIONS
Prednisone twice daily for 5 days    Okay to add back the propranolol    Follow up if symptoms do not improve or worsen.      Preventive Health Recommendations  Female Ages 26 - 39  Yearly exam:   See your health care provider every year in order to  Review health changes.   Discuss preventive care.    Review your medicines if you your doctor has prescribed any.    Until age 30: Get a Pap test every three years (more often if you have had an abnormal result).    After age 30: Talk to your doctor about whether you should have a Pap test every 3 years or have a Pap test with HPV screening every 5 years.   You do not need a Pap test if your uterus was removed (hysterectomy) and you have not had cancer.  You should be tested each year for STDs (sexually transmitted diseases), if you're at risk.   Talk to your provider about how often to have your cholesterol checked.  If you are at risk for diabetes, you should have a diabetes test (fasting glucose).  Shots: Get a flu shot each year. Get a tetanus shot every 10 years.   Nutrition:   Eat at least 5 servings of fruits and vegetables each day.  Eat whole-grain bread, whole-wheat pasta and brown rice instead of white grains and rice.  Get adequate Calcium and Vitamin D.     Lifestyle  Exercise at least 150 minutes a week (30 minutes a day, 5 days of the week). This will help you control your weight and prevent disease.  Limit alcohol to one drink per day.  No smoking.   Wear sunscreen to prevent skin cancer.  See your dentist every six months for an exam and cleaning.

## 2024-01-16 NOTE — ASSESSMENT & PLAN NOTE
Working on revision of lab band to bypass due to GERD.  In person education visit today.  Task list updated and reviewed.  Continue working with dietitians and completing task list

## 2024-01-16 NOTE — PROGRESS NOTES
Return Bariatric Surgery Note    RE: Tram Bal  MR#: [id number]  : 1985  VISIT DATE: 2024    Dear Trudi Guevara,    I had the pleasure of seeing your patient, Tram Bal, in my post-bariatric surgery assessment clinic.    Assessment & Plan   Problem List Items Addressed This Visit       GERD without esophagitis     Working on revision of lab band to bypass due to GERD.  In person education visit today.  Task list updated and reviewed.  Continue working with dietitians and completing task list         LAP-BAND surgery status     Working on revision of lab band to bypass due to GERD.  In person education visit today.  Task list updated and reviewed.  Continue working with dietitians and completing task list         Bariatric surgery status - Primary     Working on revision of lab band to bypass due to GERD.  In person education visit today.  Task list updated and reviewed.  Continue working with dietitians and completing task list         Overweight with body mass index (BMI) of 29 to 29.9 in adult     Working on revision of lab band to bypass due to GERD.  In person education visit today.  Task list updated and reviewed.  Continue working with dietitians and completing task list             PATIENT INSTRUCTIONS:    Bariatric Task List  Psychological Evaluation with MMPI and clearance for weight loss surgery.- Call [phone] to schedule. This is for Dr. Carpenter but refer to packet for full possible list  Lose 5 lbs prior to surgery.  Goal Weight: 171 lbs - taken from recent surgeon visit completed weight is 170.5 lbs today  Have preoperative laboratory tests drawn. completed  Psychological Evaluation with MMPI and clearance for weight loss surgery. Needed - see number above and packet for schedule options  Achieve clearance from dietitian to see surgeon. needed  In Person Bariatric Education Visit due today  Online Seminar to watch: Please view our Weight Loss Surgery Seminar at the  following website:     [email or web address]     FOLLOW-UP:  Please call [phone] to schedule your next visit in January with dietitians and monthly until cleared.    28 minutes spent on the date of the encounter doing chart review, history and exam, result review, counseling, developing plan of care, documentation, and further activities as noted        CHIEF COMPLAINT: Post-bariatric surgery follow-up for lap band 4/14/11 with Dr. Li. She is interested in lap revision from band to gastric bypass. She saw Nikita Winter PA-C 10/17/23 to start this process. She has had significant GERD and saw Dr Zaragoza with plan for revision and creation of a task list:    Task list reviewed and updated as below:    Lose 5 lbs prior to surgery.  Goal Weight: 171 lbs - taken from recent surgeon visit completed weight is 170.5 lbs today  Have preoperative laboratory tests drawn. completed  Psychological Evaluation with MMPI and clearance for weight loss surgery. needed  Achieve clearance from dietitian to see surgeon. needed  In Person Bariatric Education Visit due today  Online Seminar needed      Today we reviewed that task list as well as discussed anatomy changes with bypass as well as associated risks and benefits including immediate post-op risk of complications/death and long term risks of complications, weight gain, and deficiencies. Included discussion of surgery risks, anesthesia complications, infections, leaks, bleeds, PE/DVT, strictures, dehydration, chronic N/V/C/D, hernias, vitamin/mineral deficiencies and risks for long term/permanent complications, weight regain, need for yearly follow-up and taking vitamins, as well as general increased risk for ulcers. Discussed this was not a comprehensive/exhaustive risk/benefit conversation. Patient voiced understanding and had no additional questions/concerns and interested in proceeding. She will continue to work on Task  List.      HISTORY OF PRESENT ILLNESS:      1/15/2024     3:38 PM   Questions Regarding Prior Weight Loss Surgery Reviewed With Patient   I had the following weight loss procedure Laparoscopic Adjustable Gastric Band   What year was your surgery? 2011   How has your weight changed since your last visit? I have stayed about the same   Do you currently have any of the following Heartburn, acid reflux, or GERD (acid reflux disease)?   Do you have any concerns today? None       Weight History:      1/15/2024     3:38 PM   --   What is your highest lifetime weight? 220   What is your lowest weight since surgery? (In pounds) 165     Initial Weight (lbs): 212.9 lbs  Weight: 170 lb 8 oz (77.3 kg)  Last Visits Weight: 175 lb (79.4 kg)  Cumulative weight loss (lbs): 42.4  Weight Loss Percentage: 19.92%      SUPPLEMENT INFORMATION from RD visit 12/22:  Women's Multivitamin  Vitamin D [unsure of dose]  Plans to restart Vitamin B12 per recent labwork        1/15/2024     3:38 PM   Questions Regarding Co-Morbidities and Health Concerns Reviewed With Patient   Pre-diabetes Stayed the same   Diabetes II Never   High Blood Pressure Never   High cholesterol Never   Heartburn/Reflux Worsened   Sleep apnea Never   PCOS Never   Back pain Improved   Joint pain Never   Lower leg swelling Never           1/15/2024     3:38 PM   Eating Habits   How many meals do you eat per day? 3   Do you snack between meals? Sometimes   How much food are you eating at each meal? 1/2 cup to 1 cup   Are you able to separate your meals and liquids by at least 30 minutes? Yes   Are you able to avoid liquid calories? Yes           1/15/2024     3:38 PM   Exercise Questions Reviewed With Patient   How often do you exercise? 1 to 2 times per week   What is the duration of your exercise (in minutes)? 30 Minutes   What types of exercise do you do? walking    other   What keeps you from being more active? I have recently been sick    Too tired     Social  "History:      1/15/2024     3:38 PM   --   Are you smoking? No   Are you drinking alcohol? Yes     Medications:  Current Outpatient Medications   Medication    acyclovir (ZOVIRAX) 800 MG tablet    etonogestrel-ethinyl estradiol (ELURYNG) 0.12-0.015 MG/24HR vaginal ring    FLUoxetine (PROZAC) 40 MG capsule    hydrOXYzine (ATARAX) 25 MG tablet    Multiple Vitamins-Iron TABS    omeprazole (PRILOSEC) 40 MG DR capsule    propranolol (INDERAL) 10 MG tablet     No current facility-administered medications for this visit.         1/15/2024     3:38 PM   --   Do you avoid NSAIDs such as (Ibuprofen, Aleve, Naproxen, Advil)? Yes       ROS:  GI:       1/15/2024     3:38 PM   --   Vomiting No   Diarrhea No   Constipation No   Swallowing trouble No   Abdominal pain Yes   Heartburn Yes     Skin:       1/15/2024     3:38 PM   BAR RBS ROS - SKIN   Rash in skin folds No     Psych:       1/15/2024     3:38 PM   --   Depression Yes   Anxiety Yes     Female Only:       1/15/2024     3:38 PM   BAR RBS ROS -    Female only Birth control   Stress urinary incontinence No       LABS/IMAGING/MEDICAL RECORDS REVIEW:   Reviewed    Reviewed CMP/hgA1c/TSH from 12/21/23  CBC and vitamin D from 8/23    PHYSICAL EXAMINATION:  /80 (BP Location: Left arm, Patient Position: Sitting, Cuff Size: Adult Large)   Pulse 80   Ht 5' 4.25\" (1.632 m)   Wt 170 lb 8 oz (77.3 kg)   SpO2 100%   BMI 29.04 kg/m    GENERAL: Healthy, alert and no distress  EYES: Eyes grossly normal to inspection.    Oral: No missing molars. Dentition appears normal  Neck: No masses or swellings. No tenderness noted.  RESP: No audible wheeze, cough, or visible cyanosis.  No increased work of breathing. Lungs clear to auscultation  Heart: regular rate and rhythm. No significant murmurs, rubs, or gallops  Abdomen: BS intact. No rebound or guarding. No abnormal palpable masses - lap band port palpable.  SKIN: Visible skin clear.   NEURO: Mentation and speech appropriate for " age.  PSYCH: Mentation appears normal, affect normal/bright, judgement and insight intact, normal speech and appearance well-groomed.      COUNSELING PROVIDED - general recommendations discussed in visit and provided in AVS:  We reviewed the important post op bariatric recommendations:  eating 3 meals daily  eating protein first, getting >60gm protein daily  eating slowly, chewing food well  avoiding/limiting calorie containing beverages  avoiding fluids 30 minutes before, during, and after meals  limiting restaurant or cafeteria eating to twice a week or less  Pt reminded to avoid marginal ulcers she should avoid tobacco at all, alcohol in excess, caffeine, and NSAIDS (unless indicated for cardioprotection or otherwise and opposed by a PPI).  Pt encouraged to establish and maintain a consistent physical activity routine, 6-8 hours of restorative sleep each night and optimal stress management.  Pt counseled on the importance of life long vitamin supplementation and life long follow up.    ANTON DelacruzC

## 2024-01-16 NOTE — PROGRESS NOTES
SUBJECTIVE:   Tram is a 38 year old, presenting for the following:  Physical, Contraception, and Mental Health Problem        2024     1:17 PM   Additional Questions   Roomed by DAHIANA Malik       Healthy Habits:     Getting at least 3 servings of Calcium per day:  Yes    Bi-annual eye exam:  NO    Dental care twice a year:  Yes    Sleep apnea or symptoms of sleep apnea:  None    Diet:  Regular (no restrictions)    Frequency of exercise:  1 day/week    Duration of exercise:  15-30 minutes    Taking medications regularly:  Yes    Medication side effects:  None    Additional concerns today:  Yes    Rash- will get this on neck and chest when hot. Using clotrimazole cream helps this.     Musculoskeletal problem/pain    Duration: 3 days   Description  Location: Neck and back - left side   Intensity:  moderate  Accompanying signs and symptoms: mild weakness down left arm   History  Previous similar problem: no   Previous evaluation:  none  Precipitating or alleviating factors:  Trauma or overuse: no   Aggravating factors include: movement   Therapies tried and outcome: lidocaine patches, tylenol, muscle relaxer (old)        Today's PHQ-9 Score:       1/15/2024     3:43 PM   PHQ-9 SCORE   PHQ-9 Total Score MyChart 11 (Moderate depression)   PHQ-9 Total Score 11       Depression and Anxiety Follow-Up  How are you doing with your depression since your last visit? Improved   How are you doing with your anxiety since your last visit?  Improved   Are you having other symptoms that might be associated with depression or anxiety? Yes:  feeling anxious (graves)    Have you had a significant life event? No   Do you have any concerns with your use of alcohol or other drugs? No    Social History     Tobacco Use    Smoking status: Former     Packs/day: 0.00     Years: 3.00     Additional pack years: 0.00     Total pack years: 0.00     Types: Cigarettes     Quit date: 2010     Years since quittin.7    Smokeless  tobacco: Never    Tobacco comments:     quit smoking may 2010    Vaping Use    Vaping Use: Never used   Substance Use Topics    Alcohol use: Yes     Comment: occasionally    Drug use: No         2022     5:02 PM 3/21/2023     8:49 AM 1/15/2024     3:43 PM   PHQ   PHQ-9 Total Score 24 7 11   Q9: Thoughts of better off dead/self-harm past 2 weeks Not at all Not at all Not at all         2022    10:48 AM 10/10/2022    11:31 AM 2022     5:02 PM   CLARA-7 SCORE   Total Score 17 (severe anxiety)     Total Score 17 5 20         Social History     Tobacco Use    Smoking status: Former     Packs/day: 0.00     Years: 3.00     Additional pack years: 0.00     Total pack years: 0.00     Types: Cigarettes     Quit date: 2010     Years since quittin.7    Smokeless tobacco: Never    Tobacco comments:     quit smoking may 2010    Substance Use Topics    Alcohol use: Yes     Comment: occasionally           1/15/2024     3:49 PM   Alcohol Use   Prescreen: >3 drinks/day or >7 drinks/week? No     Reviewed orders with patient.  Reviewed health maintenance and updated orders accordingly - Yes      Breast Cancer Screening:    FHS-7:        No data to display              Pertinent mammograms are reviewed under the imaging tab.    History of abnormal Pap smear:       Latest Ref Rng & Units 2023    11:00 AM 10/10/2022    11:15 AM 2020     5:53 PM   PAP / HPV   PAP  Negative for Intraepithelial Lesion or Malignancy (NILM)  Negative for Intraepithelial Lesion or Malignancy (NILM)     PAP (Historical)    NIL    HPV 16 DNA Negative Negative  Negative     HPV 18 DNA Negative Negative  Negative     Other HR HPV Negative Positive  Positive       Reviewed and updated as needed this visit by clinical staff   Tobacco  Allergies  Meds  Problems  Med Hx  Surg Hx  Fam Hx          Reviewed and updated as needed this visit by Provider   Tobacco  Allergies  Meds  Problems  Med Hx  Surg Hx  Fam Hx            ROS:  "10 point ROS neg other than the symptoms noted above in the HPI    OBJECTIVE:   /72 (Cuff Size: Adult Large)   Pulse 90   Temp 99.1  F (37.3  C) (Tympanic)   Resp 16   Ht 1.632 m (5' 4.25\")   Wt 77.1 kg (170 lb)   LMP 12/28/2023   SpO2 98%   BMI 28.95 kg/m    Physical Exam  GENERAL: healthy, alert and no distress  EYES: Eyes grossly normal to inspection, PERRL and conjunctivae and sclerae normal  HENT: ear canals and TM's normal, nose and mouth without ulcers or lesions  NECK: no adenopathy, no asymmetry, masses, or scars and thyroid normal to palpation  RESP: lungs clear to auscultation - no rales, rhonchi or wheezes  BREAST: normal without masses, tenderness or nipple discharge and no palpable axillary masses or adenopathy  CV: regular rate and rhythm, normal S1 S2, no S3 or S4, no murmur, click or rub, no peripheral edema and peripheral pulses strong  ABDOMEN: soft, nontender, no hepatosplenomegaly, no masses and bowel sounds normal   (female): normal female external genitalia, normal urethral meatus, vaginal mucosa pink, moist, well rugated, and normal cervix/adnexa/uterus without masses or discharge  MS: tenderness present on left upper trapezius, neck ROM decreased L  SKIN: no suspicious lesions or rashes  NEURO: Normal strength and tone, mentation intact and speech normal  PSYCH: mentation appears normal, affect normal/bright    Diagnostic Test Results:  Labs reviewed in Epic    ASSESSMENT/PLAN:   (Z00.00) Routine general medical examination at a health care facility  (primary encounter diagnosis)    (Z30.44) Encounter for surveillance of vaginal ring hormonal contraceptive device  Comment: happy with current contraception, considering tubal. Will follow up with OB/GYN if decides to pursue this.   Plan: etonogestrel-ethinyl estradiol (ELURYNG)         0.12-0.015 MG/24HR vaginal ring          (F32.1) Moderate major depression (H)  Comment: stable.   Plan: FLUoxetine (PROZAC) 40 MG capsule       " "   (F41.1) Generalized anxiety disorder  Comment: stable but felt better when on the propranolol, plan to restart at this time.   Plan: FLUoxetine (PROZAC) 40 MG capsule          (E05.00) Graves disease  Comment: per above.   Plan: propranolol (INDERAL) 10 MG tablet          (Z12.4) Cervical cancer screening  Comment:   Plan: Pap Screen with HPV - recommended age 30 - 65         years          (B36.0) Tinea versicolor  Comment: uses Clotrimazole as needed for symptoms, refill sent to the pharmacy  Plan: clotrimazole (LOTRIMIN) 1 % external cream          (M54.2) Cervicalgia  Comment: prednisone sent to the pharmacy for symptoms. Symptomatic care and follow up discussed  Plan: predniSONE (DELTASONE) 20 MG tablet              COUNSELING:  Reviewed preventive health counseling, as reflected in patient instructions      BMI:   Estimated body mass index is 28.95 kg/m  as calculated from the following:    Height as of this encounter: 1.632 m (5' 4.25\").    Weight as of this encounter: 77.1 kg (170 lb).         She reports that she quit smoking about 13 years ago. Her smoking use included cigarettes. She has never used smokeless tobacco.        DEON Torres CNP  [facility]  "

## 2024-01-22 ENCOUNTER — PATIENT OUTREACH (OUTPATIENT)
Dept: FAMILY MEDICINE | Facility: CLINIC | Age: 39
End: 2024-01-22

## 2024-01-22 DIAGNOSIS — D06.9 CIN III (CERVICAL INTRAEPITHELIAL NEOPLASIA III): Primary | ICD-10-CM

## 2024-01-22 LAB
HUMAN PAPILLOMA VIRUS 16 DNA: NEGATIVE
HUMAN PAPILLOMA VIRUS 18 DNA: POSITIVE
HUMAN PAPILLOMA VIRUS FINAL DIAGNOSIS: ABNORMAL
HUMAN PAPILLOMA VIRUS OTHER HR: POSITIVE

## 2024-02-26 ENCOUNTER — LAB (OUTPATIENT)
Dept: LAB | Facility: CLINIC | Age: 39
End: 2024-02-26
Payer: COMMERCIAL

## 2024-02-26 DIAGNOSIS — E05.00 GRAVES DISEASE: ICD-10-CM

## 2024-02-26 LAB
T3 SERPL-MCNC: 163 NG/DL (ref 85–202)
T4 FREE SERPL-MCNC: 0.98 NG/DL (ref 0.9–1.7)
TSH SERPL DL<=0.005 MIU/L-ACNC: 1.36 UIU/ML (ref 0.3–4.2)

## 2024-02-26 PROCEDURE — 84480 ASSAY TRIIODOTHYRONINE (T3): CPT

## 2024-02-26 PROCEDURE — 36415 COLL VENOUS BLD VENIPUNCTURE: CPT

## 2024-02-26 PROCEDURE — 84439 ASSAY OF FREE THYROXINE: CPT

## 2024-02-26 PROCEDURE — 84443 ASSAY THYROID STIM HORMONE: CPT

## 2024-03-14 DIAGNOSIS — Z98.84 BARIATRIC SURGERY STATUS: ICD-10-CM

## 2024-03-14 DIAGNOSIS — K21.9 GERD WITHOUT ESOPHAGITIS: ICD-10-CM

## 2024-03-15 RX ORDER — OMEPRAZOLE 40 MG/1
40 CAPSULE, DELAYED RELEASE ORAL DAILY
Qty: 90 CAPSULE | Refills: 0 | Status: SHIPPED | OUTPATIENT
Start: 2024-03-15

## 2024-03-20 NOTE — PATIENT INSTRUCTIONS
Shahid Ugalde!      It was great chatting with you again today! Here's a summary of the goals we discussed:    1. Start Calcium per guidelines  - 500mg 3x/day OR 600mg 2x/day  - Take at least 2 hours apart from multivitamin    2. Eliminate caffeine, carbonation and alcohol  - Switch to decaf coffee    3. Switch to low-fat milk  - Skim or 1% works great  - Additionally, consider trying some protein shakes, as you'll need a fair amount of these after surgery    4. Start an exercise regimen  - Consider starting with ~2-3x/week    5. Bring lunch from home  - As discussed, restaurant food often results in nausea, reflux and/or diarrhea due to the fat content.   - Try preparing a little extra at dinner, and taking leftovers to work       Plan on following up in 1 month. This can be scheduled via our call center at . Reach out sooner with any questions or concerns. Have a great day!      Jessica Pierce RD, LD  Clinical Dietitian

## 2024-03-20 NOTE — PROGRESS NOTES
"Tram is a 38 year old who is being evaluated via a billable video visit.      The patient has been notified of following:     \"This video visit will be conducted via a call between you and your physician/provider. We have found that certain health care needs can be provided without the need for an in-person physical exam.  This service lets us provide the care you need with a video conversation.  If a prescription is necessary we can send it directly to your pharmacy.  If lab work is needed we can place an order for that and you can then stop by our lab to have the test done at a later time.    Video visits are billed at different rates depending on your insurance coverage.  Please reach out to your insurance provider with any questions.    If during the course of the call the physician/provider feels a video visit is not appropriate, you will not be charged for this service.\"    Patient has given verbal consent for Video visit? Yes    How would you like to obtain your AVS? MyChart    If the video visit is dropped, the invitation should be resent by: Text to cell phone: [phone]    Will anyone else be joining your video visit? No    I    Video-Visit Details    Type of service:  Video Visit    Originating Location (pt. Location): Home    Distant Location (provider location):   Off-Site - Provider Home Office    Platform used for Video Visit: [employer]    Video Start Time:  11:00am    Video End Time: 11:15am    PRE SURGICAL WEIGHT LOSS NUTRITION APPOINTMENT    Tram Bal  1985  female  [id number]  38 year old    ASSESSMENT    Desired Surgical Procedure: gastric bypass (revision from band)    REASON FOR VISIT:  Tram Bal is a 38 year old year old female presents today for a pre-surgical weight loss follow-up appointment. Patient accompanied by self.    DIAGNOSIS:  Weight Status Overweight BMI 25-29.9    ANTHROPOMETRICS:  Height: 64\"   Initial Weight: 175 lbs     Current weight: 170 lbs 0 oz   BMI: " Body mass index is 29.18 kg/m .    VITAMINS AND MINERALS:  1 Multivitamin with Minerals   [Pt unsure of dose] International units Vitamin D      NUTRITION HISTORY:  Breakfast: [wakes at 9am, eats at 10am] eggs + hash browns + courtney or sausage   Lunch: [12-1pm] food from restaurant (work) - sandwich or just protein   Supper: [5-6pm] beef roast  chicken + pasta or rice +/- vegetables   Snacks: none   Fluids Consumed: Water (64oz), coffee (1 cup), milk (8oz; 2%), juice (occasional)  Eating slower: Yes  Chewing foods thoroughly: Yes  Take 20-30 minutes to consume each meal: Yes  Fluids and meals separate by at least 30 minutes: Yes  Carbonation: none  Caffeine: yes  Additional Information: Pt seen for revision evaluation several months ago but lost to follow up since. Reviewed behavior and attendance expectations necessary for RD clearance. Reviewed rationale for not consuming restaurant food. Reviewed task list and encouraged pt to schedule with psychologist.     PHYSICAL ACTIVITY:  None    DIAGNOSIS:  Previous Nutrition Diagnosis: Obesity related to long history of self- monitoring deficit and excessive energy intake evidenced by BMI of 30.04 kg/m2  No change, modified below    Previous goals:   Start Calcium per guidelines - not met  Switch to decaf coffee - not met  Have a protein shake regularly - not met    Current Nutrition Diagnosis: Obesity related to long history of self-monitoring deficit and excessive energy intake as evidenced by BMI of 29.18 kg/m2.    INTERVENTION:  Nutrition Prescription: Recommended energy/nutrient modification.    GOALS:  Start Calcium per guidelines  Eliminate caffeine  Switch to low-fat milk  Bring lunch from home  Start an exercise regimen; consider starting with 2-3x/week    Intervention:  - Discussed progress towards previous goals.  - Reinforced importance of making behavior changes in preparation for bariatric surgery.   - Assessed learning needs and learning preferences        NUTRITION MONITORING AND EVALUATION:  Anticipated compliance: fair  Patient demonstrated fair-good understanding.     Follow up: Continue to monitor patient closely regarding weight loss and diet.  # of visits needed: 1-2  Cleared by RD: No     TIME SPENT WITH PATIENT: 15 minutes      Jessica Pierce RD, LD  Clinical Dietitian

## 2024-03-25 ENCOUNTER — VIRTUAL VISIT (OUTPATIENT)
Dept: SURGERY | Facility: CLINIC | Age: 39
End: 2024-03-25
Payer: COMMERCIAL

## 2024-03-25 VITALS — HEIGHT: 64 IN | BODY MASS INDEX: 29.02 KG/M2 | WEIGHT: 170 LBS

## 2024-03-25 DIAGNOSIS — Z98.84 LAP-BAND SURGERY STATUS: ICD-10-CM

## 2024-03-25 DIAGNOSIS — Z98.84 BARIATRIC SURGERY STATUS: Primary | ICD-10-CM

## 2024-03-25 DIAGNOSIS — E66.3 OVERWEIGHT WITH BODY MASS INDEX (BMI) OF 29 TO 29.9 IN ADULT: ICD-10-CM

## 2024-03-25 PROCEDURE — 97803 MED NUTRITION INDIV SUBSEQ: CPT | Mod: 95

## 2024-04-18 ENCOUNTER — OFFICE VISIT (OUTPATIENT)
Dept: SURGERY | Facility: CLINIC | Age: 39
End: 2024-04-18
Payer: COMMERCIAL

## 2024-04-18 VITALS
SYSTOLIC BLOOD PRESSURE: 120 MMHG | BODY MASS INDEX: 29.53 KG/M2 | HEIGHT: 64 IN | WEIGHT: 173 LBS | RESPIRATION RATE: 16 BRPM | DIASTOLIC BLOOD PRESSURE: 70 MMHG | OXYGEN SATURATION: 100 % | HEART RATE: 74 BPM

## 2024-04-18 DIAGNOSIS — Z98.84 HISTORY OF ADJUSTABLE GASTRIC BANDING: ICD-10-CM

## 2024-04-18 DIAGNOSIS — K21.9 CHRONIC GERD: Primary | ICD-10-CM

## 2024-04-18 PROCEDURE — 99213 OFFICE O/P EST LOW 20 MIN: CPT | Performed by: SURGERY

## 2024-04-18 NOTE — LETTER
April 18, 2024          DEON Torres CNP      RE:   Tram Bal 1985      Dear Colleague,    Thank you for referring your patient, Tram Bal, to [facility] at [facility]. Please see a copy of my visit note below.    Patient returns in follow-up after consultation initially performed back in September of last year.  She presented to discuss at that time ongoing issues related to prior adjustable gastric banding.  She had developed the sensation of chronic heartburn and epigastric discomfort.  She had difficulty swallowing.  Her fluid had been removed from the band for some time but this did not improve her symptoms.  She has had long-term success with weight loss.  We at that time had discussed her management options being band removal versus revision/conversion to Blessing-en-Y bypass.  In the interval since last being seen and after undergoing the revisional process she has decided to move forward simply with band removal.     I discussed with her the procedure of band removal.  We reviewed the risks, benefits and outcomes.  I discussed recovery.  I also discussed with her that band removal is not a complete guarantee of symptomatic improvement.  She expressed understanding of this and desired to proceed.  Surgery will be scheduled at a date of her convenience.     Again, thank you for allowing me to participate in the care of your patient.      Sincerely,      Darrel Zraagoza MD

## 2024-04-18 NOTE — PROGRESS NOTES
Patient returns in follow-up after consultation initially performed back in September of last year.  She presented to discuss at that time ongoing issues related to prior adjustable gastric banding.  She had developed the sensation of chronic heartburn and epigastric discomfort.  She had difficulty swallowing.  Her fluid had been removed from the band for some time but this did not improve her symptoms.  She has had long-term success with weight loss.  We at that time had discussed her management options being band removal versus revision/conversion to Blessing-en-Y bypass.  In the interval since last being seen and after undergoing the revisional process she has decided to move forward simply with band removal.    I discussed with her the procedure of band removal.  We reviewed the risks, benefits and outcomes.  I discussed recovery.  I also discussed with her that band removal is not a complete guarantee of symptomatic improvement.  She expressed understanding of this and desired to proceed.  Surgery will be scheduled at a date of her convenience.  Total time spent was 20 minutes with greater than 50% in face-to-face consultation.

## 2024-04-18 NOTE — PROGRESS NOTES
A bottle of Surgical Soap (chlorhexidine gluconate 4%) was provided to the patient at their consultation appointment today.    Patient was instructed to save the soap until their surgery is scheduled at which time they will be provided instructions on showering with the soap the night before and morning of surgery.     Dawn WARD

## 2024-05-07 ENCOUNTER — OFFICE VISIT (OUTPATIENT)
Dept: FAMILY MEDICINE | Facility: CLINIC | Age: 39
End: 2024-05-07
Payer: COMMERCIAL

## 2024-05-07 VITALS
DIASTOLIC BLOOD PRESSURE: 80 MMHG | TEMPERATURE: 98.5 F | HEART RATE: 79 BPM | WEIGHT: 169 LBS | HEIGHT: 65 IN | RESPIRATION RATE: 16 BRPM | SYSTOLIC BLOOD PRESSURE: 110 MMHG | BODY MASS INDEX: 28.16 KG/M2 | OXYGEN SATURATION: 96 %

## 2024-05-07 DIAGNOSIS — Z98.84 LAP-BAND SURGERY STATUS: ICD-10-CM

## 2024-05-07 DIAGNOSIS — Z01.818 PREOP GENERAL PHYSICAL EXAM: Primary | ICD-10-CM

## 2024-05-07 DIAGNOSIS — E05.00 GRAVES DISEASE: ICD-10-CM

## 2024-05-07 DIAGNOSIS — R73.03 PREDIABETES: ICD-10-CM

## 2024-05-07 LAB
ERYTHROCYTE [DISTWIDTH] IN BLOOD BY AUTOMATED COUNT: 14.4 % (ref 10–15)
HBA1C MFR BLD: 5.4 % (ref 0–5.6)
HCT VFR BLD AUTO: 37.2 % (ref 35–47)
HGB BLD-MCNC: 12.1 G/DL (ref 11.7–15.7)
MCH RBC QN AUTO: 26.1 PG (ref 26.5–33)
MCHC RBC AUTO-ENTMCNC: 32.5 G/DL (ref 31.5–36.5)
MCV RBC AUTO: 80 FL (ref 78–100)
PLATELET # BLD AUTO: 498 10E3/UL (ref 150–450)
RBC # BLD AUTO: 4.64 10E6/UL (ref 3.8–5.2)
T4 FREE SERPL-MCNC: 0.93 NG/DL (ref 0.9–1.7)
TSH SERPL DL<=0.005 MIU/L-ACNC: 1 UIU/ML (ref 0.3–4.2)
WBC # BLD AUTO: 7 10E3/UL (ref 4–11)

## 2024-05-07 PROCEDURE — 36415 COLL VENOUS BLD VENIPUNCTURE: CPT | Performed by: NURSE PRACTITIONER

## 2024-05-07 PROCEDURE — 83036 HEMOGLOBIN GLYCOSYLATED A1C: CPT | Performed by: NURSE PRACTITIONER

## 2024-05-07 PROCEDURE — 84443 ASSAY THYROID STIM HORMONE: CPT | Performed by: NURSE PRACTITIONER

## 2024-05-07 PROCEDURE — 84439 ASSAY OF FREE THYROXINE: CPT | Performed by: NURSE PRACTITIONER

## 2024-05-07 PROCEDURE — 99214 OFFICE O/P EST MOD 30 MIN: CPT | Performed by: NURSE PRACTITIONER

## 2024-05-07 PROCEDURE — 85027 COMPLETE CBC AUTOMATED: CPT | Performed by: NURSE PRACTITIONER

## 2024-05-07 PROCEDURE — 84480 ASSAY TRIIODOTHYRONINE (T3): CPT | Performed by: NURSE PRACTITIONER

## 2024-05-07 ASSESSMENT — PATIENT HEALTH QUESTIONNAIRE - PHQ9
10. IF YOU CHECKED OFF ANY PROBLEMS, HOW DIFFICULT HAVE THESE PROBLEMS MADE IT FOR YOU TO DO YOUR WORK, TAKE CARE OF THINGS AT HOME, OR GET ALONG WITH OTHER PEOPLE: NOT DIFFICULT AT ALL
SUM OF ALL RESPONSES TO PHQ QUESTIONS 1-9: 3
SUM OF ALL RESPONSES TO PHQ QUESTIONS 1-9: 3

## 2024-05-07 ASSESSMENT — PAIN SCALES - GENERAL: PAINLEVEL: NO PAIN (0)

## 2024-05-07 NOTE — PATIENT INSTRUCTIONS
Preparing for Your Surgery  Getting started  A nurse will call you to review your health history and instructions. They will give you an arrival time based on your scheduled surgery time. Please be ready to share:  Your doctor's clinic name and phone number  Your medical, surgical, and anesthesia history  A list of allergies and sensitivities  A list of medicines, including herbal treatments and over-the-counter drugs  Whether the patient has a legal guardian (ask how to send us the papers in advance)  Please tell us if you're pregnant--or if there's any chance you might be pregnant. Some surgeries may injure a fetus (unborn baby), so they require a pregnancy test. Surgeries that are safe for a fetus don't always need a test, and you can choose whether to have one.   If you have a child who's having surgery, please ask for a copy of Preparing for Your Child's Surgery.    Preparing for surgery  Within 10 to 30 days of surgery: Have a pre-op exam (sometimes called an H&P, or History and Physical). This can be done at a clinic or pre-operative center.  If you're having a , you may not need this exam. Talk to your care team.  At your pre-op exam, talk to your care team about all medicines you take. If you need to stop any medicines before surgery, ask when to start taking them again.  We do this for your safety. Many medicines can make you bleed too much during surgery. Some change how well surgery (anesthesia) drugs work.  Call your insurance company to let them know you're having surgery. (If you don't have insurance, call [phone].)  Call your clinic if there's any change in your health. This includes signs of a cold or flu (sore throat, runny nose, cough, rash, fever). It also includes a scrape or scratch near the surgery site.  If you have questions on the day of surgery, call your hospital or surgery center.  Eating and drinking guidelines  For your safety: Unless your surgeon tells you otherwise,  follow the guidelines below.  Eat and drink as usual until 8 hours before you arrive for surgery. After that, no food or milk.  Drink clear liquids until 2 hours before you arrive. These are liquids you can see through, like water, Gatorade, and Propel Water. They also include plain black coffee and tea (no cream or milk), candy, and breath mints. You can spit out gum when you arrive.  If you drink alcohol: Stop drinking it the night before surgery.  If your care team tells you to take medicine on the morning of surgery, it's okay to take it with a sip of water.  Preventing infection  Shower or bathe the night before and morning of your surgery. Follow the instructions your clinic gave you. (If no instructions, use regular soap.)  Don't shave or clip hair near your surgery site. We'll remove the hair if needed.  Don't smoke or vape the morning of surgery. You may chew nicotine gum up to 2 hours before surgery. A nicotine patch is okay.  Note: Some surgeries require you to completely quit smoking and nicotine. Check with your surgeon.  Your care team will make every effort to keep you safe from infection. We will:  Clean our hands often with soap and water (or an alcohol-based hand rub).  Clean the skin at your surgery site with a special soap that kills germs.  Give you a special gown to keep you warm. (Cold raises the risk of infection.)  Wear special hair covers, masks, gowns and gloves during surgery.  Give antibiotic medicine, if prescribed. Not all surgeries need antibiotics.  What to bring on the day of surgery  Photo ID and insurance card  Copy of your health care directive, if you have one  Glasses and hearing aids (bring cases)  You can't wear contacts during surgery  Inhaler and eye drops, if you use them (tell us about these when you arrive)  CPAP machine or breathing device, if you use them  A few personal items, if spending the night  If you have . . .  A pacemaker, ICD (cardiac defibrillator) or other  implant: Bring the ID card.  An implanted stimulator: Bring the remote control.  A legal guardian: Bring a copy of the certified (court-stamped) guardianship papers.  Please remove any jewelry, including body piercings. Leave jewelry and other valuables at home.  If you're going home the day of surgery  You must have a responsible adult drive you home. They should stay with you overnight as well.  If you don't have someone to stay with you, and you aren't safe to go home alone, we may keep you overnight. Insurance often won't pay for this.  After surgery  If it's hard to control your pain or you need more pain medicine, please call your surgeon's office.  Questions?   If you have any questions for your care team, list them here: _________________________________________________________________________________________________________________________________________________________________________ ____________________________________ ____________________________________ ____________________________________  For informational purposes only. Not to replace the advice of your health care provider. Copyright   2003, 2019 [employer]. All rights reserved. Clinically reviewed by Yessy Underwood MD. [employer]ART[employer] 631214 - REV 12/22.    How to Take Your Medication Before Surgery  - STOP taking all vitamins and herbal supplements 14 days before surgery.

## 2024-05-07 NOTE — PROGRESS NOTES
Preoperative Evaluation  [facility]  [address] [place] [id number]  Phone: [phone]  Fax: [phone]  Primary Provider: Tee Guevara  Pre-op Performing Provider: TEE GUEVARA  May 7, 2024     Tram is a 38 year old, presenting for the following:  Pre-Op Exam        5/7/2024    12:10 PM   Additional Questions   Roomed by [facility]     Surgical Information  Surgery/Procedure: Robotic assisted laparoscopic removal of adjustable gastric band   Surgery Location: [facility]  Surgeon: Dr. Zaragoza  Surgery Date: 5/17/2024  Time of Surgery: 9:20am  Where patient plans to recover: At home with family  Fax number for surgical facility: Note does not need to be faxed, will be available electronically in Epic.    Assessment & Plan     The proposed surgical procedure is considered INTERMEDIATE risk.    Preop general physical exam    - CBC with platelets; Future  - CBC with platelets    LAP-BAND surgery status    Graves disease    - TSH  - T4 free  - T3, total    Prediabetes    - Hemoglobin A1c; Future  - Hemoglobin A1c       - No identified additional risk factors other than previously addressed    Antiplatelet or Anticoagulation Medication Instructions   - Patient is on no antiplatelet or anticoagulation medications.    Additional Medication Instructions  Patient is to take all scheduled medications on the day of surgery EXCEPT for modifications listed below:   - Beta Blockers: Continue taking on the day of surgery.   - SSRIs, SNRIs, TCAs, Antipsychotics: Continue without modification.    - Herbal medications and vitamins: HOLD 14 days prior to surgery.    Recommendation  APPROVAL GIVEN to proceed with proposed procedure, without further diagnostic evaluation.    Subjective       HPI related to upcoming procedure: history of lap band now with problems planned removal        5/7/2024     8:50 AM   Preop Questions   1. Have you ever had a heart attack or stroke? No   2. Have you  ever had surgery on your heart or blood vessels, such as a stent placement, a coronary artery bypass, or surgery on an artery in your head, neck, heart, or legs? No   3. Do you have chest pain with activity? No   4. Do you have a history of  heart failure? No   5. Do you currently have a cold, bronchitis or symptoms of other infection? No   6. Do you have a cough, shortness of breath, or wheezing? No   7. Do you or anyone in your family have previous history of blood clots? No   8. Do you or does anyone in your family have a serious bleeding problem such as prolonged bleeding following surgeries or cuts? No   9. Have you ever had problems with anemia or been told to take iron pills? No   10. Have you had any abnormal blood loss such as black, tarry or bloody stools, or abnormal vaginal bleeding? No   11. Have you ever had a blood transfusion? No   12. Are you willing to have a blood transfusion if it is medically needed before, during, or after your surgery? Yes   13. Have you or any of your relatives ever had problems with anesthesia? No   14. Do you have sleep apnea, excessive snoring or daytime drowsiness? No   15. Do you have any artifical heart valves or other implanted medical devices like a pacemaker, defibrillator, or continuous glucose monitor? No   16. Do you have artificial joints? No   17. Are you allergic to latex? No   18. Is there any chance that you may be pregnant? No       Health Care Directive  Patient does not have a Health Care Directive or Living Will: Discussed advance care planning with patient; information given to patient to review.    Preoperative Review of    reviewed - no record of controlled substances prescribed.      Status of Chronic Conditions:  See problem list for active medical problems.  Problems all longstanding and stable, except as noted/documented.  See ROS for pertinent symptoms related to these conditions.    Patient Active Problem List    Diagnosis Date Noted     Postsurgical malabsorption 07/06/2023     Priority: Medium    Iron deficiency 01/16/2020     Priority: Medium    Vitamin D deficiency 10/28/2018     Priority: Medium    Mild major depression (H)      Priority: Medium    Overweight with body mass index (BMI) of 29 to 29.9 in adult 08/07/2018     Priority: Medium    Bariatric surgery status 01/10/2017     Priority: Medium    Generalized anxiety disorder 03/29/2016     Priority: Medium    Tobacco abuse, in remission 07/13/2011     Priority: Medium    LAP-BAND surgery status 05/16/2011     Priority: Medium    GERD without esophagitis 04/19/2010     Priority: Medium    Displacement of lumbar intervertebral disc 05/15/2008     Priority: Medium    JARETH III (cervical intraepithelial neoplasia III) 08/22/2005     Priority: Medium     7/04 LSIL   10/04 [place] JARETH I  4/05 LSIL  6/05 [place] JARETH II & III  8/05 LEEP JARETH III  12/05 NIL pap  4/06 ASCUS, neg HPV  10/06, 11/07, 12/08 NIL paps  1/10 ASCUS, neg HPV  2011, 2012, 2013 NIL paps  3/31/14 NIL/neg HPV. Plan cotest in 1 year.  4/20/15 NIL/neg HPV. Plan: cotest in 3 years.  4/25/16 ASCUS, Neg HPV. Cotest in 1 year.   4/28/17 ASCUS, +High Risk HPV (Neg 16/18).  Plan [place]  5/22/17 [place] Benign. ASCUS pap, +HR HPV (Not types 16/18). Plan cotest in 1 year.   10/26/18 NIL Pap, + HR HPV (Neg 16/18). Plan colp  4/1/19 [place] ECC - atypia, favor reactive change.  NIL pap, + HR HPV (not 16 or 18). Plan: cotest in 1 yr   11/23/20 NIL Pap, + HR HPV (neg 16/18). [place] due by 2/23/2021 6/29/21 [place] ECC - no JARETH. Plan: cotest in 1 year  8/8/22 Lost to follow-up for pap tracking   10/10/22 NIL pap, + HR HPV (not 16 or 18) colp by 1/10/23  1/6/23 Colpo ECC neg. NIL pap, +HR HPV (not 16/18). Plan: cotest in 1 year   1/16/24 NIL pap, +HR HPV 18 & other. Plan: colposcopy due before 4/16/24 1/22/24 left message / mychart sent / mychart read  3/14/24 Reminder mychart  4/16/24 [place] not done. Tracking updated for 6 mo colp/pap due 7/16/24.          Past  Medical History:   Diagnosis Date    Abnormal Pap smear of cervix 2004    , , , ,     Cervical high risk HPV (human papillomavirus) test positive 2017,     Depressive disorder 2016    Displacement of lumbar intervertebral disc 05/15/2008    Esophageal reflux     Hx of colposcopy with cervical biopsy 2017:[place]- Benign.     Thyroid disease      Past Surgical History:   Procedure Laterality Date    ABDOMEN SURGERY  3/2023    Tummy tuck    COLPOSCOPY,LOOP ELECTRD CERVIX EXCIS  2005    JARETH III    GI SURGERY  2011    LAP BANDING for obesity, pre-DM    ZZC NONSPECIFIC PROCEDURE      Oral surgery     Current Outpatient Medications   Medication Sig Dispense Refill    etonogestrel-ethinyl estradiol (ELURYNG) 0.12-0.015 MG/24HR vaginal ring INSERT 1 RING VAGINALLY FOR 21 DAYS, THEN REMOVE FOR 1 WEEK, THEN REPEAT WITH NEW RING 3 each 3    FLUoxetine (PROZAC) 40 MG capsule Take 1 capsule (40 mg) by mouth daily 90 capsule 3    hydrOXYzine (ATARAX) 25 MG tablet Take 0.5-1 tablets (12.5-25 mg) by mouth 3 times daily as needed for anxiety 30 tablet 1    Multiple Vitamins-Iron TABS Take by mouth daily       omeprazole (PRILOSEC) 40 MG DR capsule TAKE 1 CAPSULE(40 MG) BY MOUTH DAILY 90 capsule 0    propranolol (INDERAL) 10 MG tablet Take 1 tablet (10 mg) by mouth 2 times daily 60 tablet 5    acyclovir (ZOVIRAX) 800 MG tablet Take 1 tablet (800 mg) by mouth 3 times daily (Patient not taking: Reported on 2024) 21 tablet 2    clotrimazole (LOTRIMIN) 1 % external cream Apply topically 2 times daily (Patient not taking: Reported on 2024) 60 g 1       Allergies   Allergen Reactions    Famvir [Famciclovir] Hives        Social History     Tobacco Use    Smoking status: Former     Current packs/day: 0.00     Types: Cigarettes     Quit date: 2007     Years since quittin.0    Smokeless tobacco: Never    Tobacco comments:     quit smoking may 2010    Substance  "Use Topics    Alcohol use: Yes     Comment: occasionally     History   Drug Use No         Review of Systems    Review of Systems  CONSTITUTIONAL: NEGATIVE for fever, chills, change in weight  INTEGUMENTARY/SKIN: NEGATIVE for worrisome rashes, moles or lesions  EYES: NEGATIVE for vision changes or irritation  ENT/MOUTH: NEGATIVE for ear, mouth and throat problems  RESP: NEGATIVE for significant cough or SOB  CV: NEGATIVE for chest pain, palpitations or peripheral edema  GI: POSITIVE for abdominal pain at lap band site and heartburn or reflux  : NEGATIVE for frequency, dysuria, or hematuria  MUSCULOSKELETAL: NEGATIVE for significant arthralgias or myalgia  NEURO: NEGATIVE for weakness, dizziness or paresthesias  ENDOCRINE: NEGATIVE for temperature intolerance, skin/hair changes  HEME: NEGATIVE for bleeding problems  PSYCHIATRIC: NEGATIVE for changes in mood or affect    Objective    /80 (BP Location: Right arm)   Pulse 79   Temp 98.5  F (36.9  C) (Tympanic)   Resp 16   Ht 1.651 m (5' 5\")   Wt 76.7 kg (169 lb)   LMP 04/09/2024   SpO2 96%   BMI 28.12 kg/m     Estimated body mass index is 28.12 kg/m  as calculated from the following:    Height as of this encounter: 1.651 m (5' 5\").    Weight as of this encounter: 76.7 kg (169 lb).  Physical Exam  GENERAL: alert and no distress  EYES: Eyes grossly normal to inspection, PERRL and conjunctivae and sclerae normal  HENT: ear canals and TM's normal, nose and mouth without ulcers or lesions  NECK: no adenopathy, no asymmetry, masses, or scars  RESP: lungs clear to auscultation - no rales, rhonchi or wheezes  CV: regular rate and rhythm, normal S1 S2, no S3 or S4, no murmur, click or rub, no peripheral edema  ABDOMEN: soft, nontender, no hepatosplenomegaly, no masses and bowel sounds normal  MS: no gross musculoskeletal defects noted, no edema  SKIN: no suspicious lesions or rashes  NEURO: Normal strength and tone, mentation intact and speech normal  PSYCH: " mentation appears normal, affect normal/bright    Recent Labs   Lab Test 12/21/23  1416 08/02/23  1326 11/06/22  0105   HGB  --  11.9 13.0   PLT  --  527* 533*     --  139   POTASSIUM 4.0  --  3.8   CR 0.64  --  0.61   A1C 5.7*  --   --         Diagnostics  Recent Results (from the past 24 hour(s))   CBC with platelets    Collection Time: 05/07/24 12:45 PM   Result Value Ref Range    WBC Count 7.0 4.0 - 11.0 10e3/uL    RBC Count 4.64 3.80 - 5.20 10e6/uL    Hemoglobin 12.1 11.7 - 15.7 g/dL    Hematocrit 37.2 35.0 - 47.0 %    MCV 80 78 - 100 fL    MCH 26.1 (L) 26.5 - 33.0 pg    MCHC 32.5 31.5 - 36.5 g/dL    RDW 14.4 10.0 - 15.0 %    Platelet Count 498 (H) 150 - 450 10e3/uL   Hemoglobin A1c    Collection Time: 05/07/24 12:45 PM   Result Value Ref Range    Hemoglobin A1C 5.4 0.0 - 5.6 %      No EKG required, no history of coronary heart disease, significant arrhythmia, peripheral arterial disease or other structural heart disease.    Revised Cardiac Risk Index (RCRI)  The patient has the following serious cardiovascular risks for perioperative complications:   - No serious cardiac risks = 0 points     RCRI Interpretation: 0 points: Class I (very low risk - 0.4% complication rate)         Signed Electronically by: DEON Torres CNP  Copy of this evaluation report is provided to requesting physician.      Answers submitted by the patient for this visit:  Patient Health Questionnaire (Submitted on 5/7/2024)  If you checked off any problems, how difficult have these problems made it for you to do your work, take care of things at home, or get along with other people?: Not difficult at all  PHQ9 TOTAL SCORE: 3

## 2024-05-08 LAB — T3 SERPL-MCNC: 134 NG/DL (ref 85–202)

## 2024-05-13 ENCOUNTER — VIRTUAL VISIT (OUTPATIENT)
Dept: FAMILY MEDICINE | Facility: CLINIC | Age: 39
End: 2024-05-13
Payer: COMMERCIAL

## 2024-05-13 DIAGNOSIS — J06.9 VIRAL URI WITH COUGH: Primary | ICD-10-CM

## 2024-05-13 PROCEDURE — 99213 OFFICE O/P EST LOW 20 MIN: CPT | Mod: 95 | Performed by: NURSE PRACTITIONER

## 2024-05-13 RX ORDER — ALBUTEROL SULFATE 90 UG/1
2 AEROSOL, METERED RESPIRATORY (INHALATION) EVERY 6 HOURS PRN
Qty: 18 G | Refills: 1 | Status: ON HOLD | OUTPATIENT
Start: 2024-05-13 | End: 2024-05-17

## 2024-05-13 ASSESSMENT — ENCOUNTER SYMPTOMS: COUGH: 1

## 2024-05-13 NOTE — PROGRESS NOTES
Tram is a 38 year old who is being evaluated via a billable video visit.    How would you like to obtain your AVS? MyChart  If the video visit is dropped, the invitation should be resent by: Text to cell phone: [phone]  Will anyone else be joining your video visit? No    Assessment & Plan     Viral URI with cough  No acute distress, symptoms likely viral at this time. Recommend symptomatic care at this time. Tram will let her surgery center know that she has developed URI symptoms since her pre-op.  Symptoms which would warrant immediate follow up discussed.       Subjective   Tram is a 38 year old, presenting for the following health issues:  Cough        5/13/2024    12:55 PM   Additional Questions   Roomed by Lori     Video Start Time:  1:07 PM    Cough    History of Present Illness       Reason for visit:  Bronchitis and/or sinus infection  Symptom onset:  3-7 days ago  Symptoms include:  Cough, sore throat, stuffed up, runny nose, chest tight, yellow discharge  Symptom intensity:  Severe  Symptom progression:  Worsening  Had these symptoms before:  Yes  Has tried/received treatment for these symptoms:  Yes  Previous treatment was successful:  Yes  Prior treatment description:  Antibiotics  What makes it worse:  Laying down  What makes it better:  Tylenol    She eats 2-3 servings of fruits and vegetables daily.She consumes 0 sweetened beverage(s) daily.She exercises with enough effort to increase her heart rate 20 to 29 minutes per day.  She exercises with enough effort to increase her heart rate 3 or less days per week.   She is taking medications regularly.    Symptoms start 4-5 days ago  Claritin and Tylenol   Started out like allergy symptoms then symptoms worsened yesterday      Review of Systems  Constitutional, HEENT, cardiovascular, pulmonary, gi and gu systems are negative, except as otherwise noted.      Objective           Vitals:  No vitals were obtained today due to virtual  visit.    Physical Exam   GENERAL: alert and no distress  EYES: Eyes grossly normal to inspection.  No discharge or erythema, or obvious scleral/conjunctival abnormalities.  RESP: No audible wheeze, cough, or visible cyanosis.    SKIN: Visible skin clear. No significant rash, abnormal pigmentation or lesions.  NEURO: Cranial nerves grossly intact.  Mentation and speech appropriate for age.  PSYCH: Appropriate affect, tone, and pace of words        Video-Visit Details    Type of service:  Video Visit   Video End Time: 1:16 PM  Originating Location (pt. Location): Home  Distant Location (provider location):  On-site  Platform used for Video Visit: Leo  Signed Electronically by: DEON Torres CNP

## 2024-05-16 ENCOUNTER — ANESTHESIA EVENT (OUTPATIENT)
Dept: SURGERY | Facility: CLINIC | Age: 39
End: 2024-05-16
Payer: COMMERCIAL

## 2024-05-17 ENCOUNTER — ANESTHESIA (OUTPATIENT)
Dept: SURGERY | Facility: CLINIC | Age: 39
End: 2024-05-17
Payer: COMMERCIAL

## 2024-05-17 ENCOUNTER — APPOINTMENT (OUTPATIENT)
Dept: SURGERY | Facility: PHYSICIAN GROUP | Age: 39
End: 2024-05-17
Payer: COMMERCIAL

## 2024-05-17 ENCOUNTER — HOSPITAL ENCOUNTER (OUTPATIENT)
Facility: CLINIC | Age: 39
Discharge: HOME | End: 2024-05-17
Attending: SURGERY | Admitting: SURGERY
Payer: COMMERCIAL

## 2024-05-17 VITALS
SYSTOLIC BLOOD PRESSURE: 129 MMHG | BODY MASS INDEX: 28.63 KG/M2 | RESPIRATION RATE: 16 BRPM | HEART RATE: 66 BPM | DIASTOLIC BLOOD PRESSURE: 87 MMHG | OXYGEN SATURATION: 94 % | TEMPERATURE: 97 F | HEIGHT: 64 IN | WEIGHT: 167.7 LBS

## 2024-05-17 DIAGNOSIS — G89.18 POSTOPERATIVE PAIN: ICD-10-CM

## 2024-05-17 DIAGNOSIS — K21.9 GERD WITHOUT ESOPHAGITIS: ICD-10-CM

## 2024-05-17 DIAGNOSIS — Z98.84 LAP-BAND SURGERY STATUS: Primary | ICD-10-CM

## 2024-05-17 LAB — HCG UR QL: NEGATIVE

## 2024-05-17 PROCEDURE — S2900 ROBOTIC SURGICAL SYSTEM: HCPCS | Performed by: SURGERY

## 2024-05-17 PROCEDURE — 250N000009 HC RX 250: Performed by: NURSE ANESTHETIST, CERTIFIED REGISTERED

## 2024-05-17 PROCEDURE — 250N000011 HC RX IP 250 OP 636: Performed by: SURGERY

## 2024-05-17 PROCEDURE — 43774 LAP RMVL GASTR ADJ ALL PARTS: CPT | Performed by: ANESTHESIOLOGY

## 2024-05-17 PROCEDURE — 250N000025 HC SEVOFLURANE, PER MIN: Performed by: SURGERY

## 2024-05-17 PROCEDURE — 250N000013 HC RX MED GY IP 250 OP 250 PS 637: Performed by: SURGERY

## 2024-05-17 PROCEDURE — 81025 URINE PREGNANCY TEST: CPT | Performed by: ANESTHESIOLOGY

## 2024-05-17 PROCEDURE — 258N000003 HC RX IP 258 OP 636: Performed by: ANESTHESIOLOGY

## 2024-05-17 PROCEDURE — 43774 LAP RMVL GASTR ADJ ALL PARTS: CPT | Performed by: SURGERY

## 2024-05-17 PROCEDURE — 370N000017 HC ANESTHESIA TECHNICAL FEE, PER MIN: Performed by: SURGERY

## 2024-05-17 PROCEDURE — 250N000011 HC RX IP 250 OP 636: Performed by: NURSE ANESTHETIST, CERTIFIED REGISTERED

## 2024-05-17 PROCEDURE — 710N000009 HC RECOVERY PHASE 1, LEVEL 1, PER MIN: Performed by: SURGERY

## 2024-05-17 PROCEDURE — 360N000077 HC SURGERY LEVEL 4, PER MIN: Performed by: SURGERY

## 2024-05-17 PROCEDURE — 43774 LAP RMVL GASTR ADJ ALL PARTS: CPT | Mod: AS | Performed by: PHYSICIAN ASSISTANT

## 2024-05-17 PROCEDURE — 272N000001 HC OR GENERAL SUPPLY STERILE: Performed by: SURGERY

## 2024-05-17 PROCEDURE — 250N000013 HC RX MED GY IP 250 OP 250 PS 637: Performed by: PHYSICIAN ASSISTANT

## 2024-05-17 PROCEDURE — 43774 LAP RMVL GASTR ADJ ALL PARTS: CPT | Performed by: NURSE ANESTHETIST, CERTIFIED REGISTERED

## 2024-05-17 PROCEDURE — 710N000012 HC RECOVERY PHASE 2, PER MINUTE: Performed by: SURGERY

## 2024-05-17 PROCEDURE — 999N000141 HC STATISTIC PRE-PROCEDURE NURSING ASSESSMENT: Performed by: SURGERY

## 2024-05-17 PROCEDURE — 250N000009 HC RX 250: Performed by: SURGERY

## 2024-05-17 RX ORDER — HYDROMORPHONE HCL IN WATER/PF 6 MG/30 ML
0.2 PATIENT CONTROLLED ANALGESIA SYRINGE INTRAVENOUS EVERY 5 MIN PRN
Status: DISCONTINUED | OUTPATIENT
Start: 2024-05-17 | End: 2024-05-17 | Stop reason: HOSPADM

## 2024-05-17 RX ORDER — CEFAZOLIN SODIUM/WATER 2 G/20 ML
2 SYRINGE (ML) INTRAVENOUS
Status: DISCONTINUED | OUTPATIENT
Start: 2024-05-17 | End: 2024-05-17 | Stop reason: HOSPADM

## 2024-05-17 RX ORDER — FENTANYL CITRATE 50 UG/ML
INJECTION, SOLUTION INTRAMUSCULAR; INTRAVENOUS PRN
Status: DISCONTINUED | OUTPATIENT
Start: 2024-05-17 | End: 2024-05-17

## 2024-05-17 RX ORDER — HEPARIN SODIUM 5000 [USP'U]/.5ML
5000 INJECTION, SOLUTION INTRAVENOUS; SUBCUTANEOUS
Status: COMPLETED | OUTPATIENT
Start: 2024-05-17 | End: 2024-05-17

## 2024-05-17 RX ORDER — DEXAMETHASONE SODIUM PHOSPHATE 4 MG/ML
INJECTION, SOLUTION INTRA-ARTICULAR; INTRALESIONAL; INTRAMUSCULAR; INTRAVENOUS; SOFT TISSUE PRN
Status: DISCONTINUED | OUTPATIENT
Start: 2024-05-17 | End: 2024-05-17

## 2024-05-17 RX ORDER — HYDROCODONE BITARTRATE AND ACETAMINOPHEN 5; 325 MG/1; MG/1
1 TABLET ORAL EVERY 4 HOURS PRN
Qty: 10 TABLET | Refills: 0 | Status: SHIPPED | OUTPATIENT
Start: 2024-05-17 | End: 2024-09-10

## 2024-05-17 RX ORDER — ONDANSETRON 4 MG/1
4 TABLET, ORALLY DISINTEGRATING ORAL EVERY 30 MIN PRN
Status: DISCONTINUED | OUTPATIENT
Start: 2024-05-17 | End: 2024-05-17 | Stop reason: HOSPADM

## 2024-05-17 RX ORDER — EPHEDRINE SULFATE 50 MG/ML
INJECTION, SOLUTION INTRAMUSCULAR; INTRAVENOUS; SUBCUTANEOUS PRN
Status: DISCONTINUED | OUTPATIENT
Start: 2024-05-17 | End: 2024-05-17

## 2024-05-17 RX ORDER — HYDRALAZINE HYDROCHLORIDE 20 MG/ML
2.5-5 INJECTION INTRAMUSCULAR; INTRAVENOUS EVERY 10 MIN PRN
Status: DISCONTINUED | OUTPATIENT
Start: 2024-05-17 | End: 2024-05-17 | Stop reason: HOSPADM

## 2024-05-17 RX ORDER — HYDROCODONE BITARTRATE AND ACETAMINOPHEN 5; 325 MG/1; MG/1
1 TABLET ORAL
Status: COMPLETED | OUTPATIENT
Start: 2024-05-17 | End: 2024-05-17

## 2024-05-17 RX ORDER — GLYCOPYRROLATE 0.2 MG/ML
INJECTION, SOLUTION INTRAMUSCULAR; INTRAVENOUS PRN
Status: DISCONTINUED | OUTPATIENT
Start: 2024-05-17 | End: 2024-05-17

## 2024-05-17 RX ORDER — PROPOFOL 10 MG/ML
INJECTION, EMULSION INTRAVENOUS CONTINUOUS PRN
Status: DISCONTINUED | OUTPATIENT
Start: 2024-05-17 | End: 2024-05-17

## 2024-05-17 RX ORDER — BUPIVACAINE HYDROCHLORIDE AND EPINEPHRINE 2.5; 5 MG/ML; UG/ML
INJECTION, SOLUTION INFILTRATION; PERINEURAL PRN
Status: DISCONTINUED | OUTPATIENT
Start: 2024-05-17 | End: 2024-05-17 | Stop reason: HOSPADM

## 2024-05-17 RX ORDER — NALOXONE HYDROCHLORIDE 0.4 MG/ML
0.1 INJECTION, SOLUTION INTRAMUSCULAR; INTRAVENOUS; SUBCUTANEOUS
Status: DISCONTINUED | OUTPATIENT
Start: 2024-05-17 | End: 2024-05-17 | Stop reason: HOSPADM

## 2024-05-17 RX ORDER — MAGNESIUM HYDROXIDE 1200 MG/15ML
LIQUID ORAL PRN
Status: DISCONTINUED | OUTPATIENT
Start: 2024-05-17 | End: 2024-05-17 | Stop reason: HOSPADM

## 2024-05-17 RX ORDER — CEFAZOLIN SODIUM/WATER 2 G/20 ML
2 SYRINGE (ML) INTRAVENOUS SEE ADMIN INSTRUCTIONS
Status: DISCONTINUED | OUTPATIENT
Start: 2024-05-17 | End: 2024-05-17 | Stop reason: HOSPADM

## 2024-05-17 RX ORDER — LIDOCAINE HYDROCHLORIDE 20 MG/ML
INJECTION, SOLUTION INFILTRATION; PERINEURAL PRN
Status: DISCONTINUED | OUTPATIENT
Start: 2024-05-17 | End: 2024-05-17

## 2024-05-17 RX ORDER — SODIUM CHLORIDE, SODIUM LACTATE, POTASSIUM CHLORIDE, CALCIUM CHLORIDE 600; 310; 30; 20 MG/100ML; MG/100ML; MG/100ML; MG/100ML
INJECTION, SOLUTION INTRAVENOUS CONTINUOUS
Status: DISCONTINUED | OUTPATIENT
Start: 2024-05-17 | End: 2024-05-17 | Stop reason: HOSPADM

## 2024-05-17 RX ORDER — CEFAZOLIN SODIUM/WATER 2 G/20 ML
2 SYRINGE (ML) INTRAVENOUS
Status: CANCELLED | OUTPATIENT
Start: 2024-05-17

## 2024-05-17 RX ORDER — DEXAMETHASONE SODIUM PHOSPHATE 4 MG/ML
4 INJECTION, SOLUTION INTRA-ARTICULAR; INTRALESIONAL; INTRAMUSCULAR; INTRAVENOUS; SOFT TISSUE
Status: DISCONTINUED | OUTPATIENT
Start: 2024-05-17 | End: 2024-05-17 | Stop reason: HOSPADM

## 2024-05-17 RX ORDER — FENTANYL CITRATE 50 UG/ML
25 INJECTION, SOLUTION INTRAMUSCULAR; INTRAVENOUS EVERY 5 MIN PRN
Status: DISCONTINUED | OUTPATIENT
Start: 2024-05-17 | End: 2024-05-17 | Stop reason: HOSPADM

## 2024-05-17 RX ORDER — LORATADINE 10 MG/1
10 TABLET ORAL PRN
COMMUNITY

## 2024-05-17 RX ORDER — AMOXICILLIN 250 MG
1-2 CAPSULE ORAL 2 TIMES DAILY
Qty: 20 TABLET | Refills: 0 | Status: SHIPPED | OUTPATIENT
Start: 2024-05-17 | End: 2024-09-10

## 2024-05-17 RX ORDER — ACETAMINOPHEN 325 MG/1
975 TABLET ORAL ONCE
Status: COMPLETED | OUTPATIENT
Start: 2024-05-17 | End: 2024-05-17

## 2024-05-17 RX ORDER — FENTANYL CITRATE 50 UG/ML
50 INJECTION, SOLUTION INTRAMUSCULAR; INTRAVENOUS EVERY 5 MIN PRN
Status: DISCONTINUED | OUTPATIENT
Start: 2024-05-17 | End: 2024-05-17 | Stop reason: HOSPADM

## 2024-05-17 RX ORDER — CEFAZOLIN SODIUM/WATER 2 G/20 ML
2 SYRINGE (ML) INTRAVENOUS SEE ADMIN INSTRUCTIONS
Status: CANCELLED | OUTPATIENT
Start: 2024-05-17

## 2024-05-17 RX ORDER — HYDROMORPHONE HYDROCHLORIDE 1 MG/ML
INJECTION, SOLUTION INTRAMUSCULAR; INTRAVENOUS; SUBCUTANEOUS PRN
Status: DISCONTINUED | OUTPATIENT
Start: 2024-05-17 | End: 2024-05-17

## 2024-05-17 RX ORDER — ONDANSETRON 2 MG/ML
4 INJECTION INTRAMUSCULAR; INTRAVENOUS EVERY 30 MIN PRN
Status: DISCONTINUED | OUTPATIENT
Start: 2024-05-17 | End: 2024-05-17 | Stop reason: HOSPADM

## 2024-05-17 RX ORDER — PROPOFOL 10 MG/ML
INJECTION, EMULSION INTRAVENOUS PRN
Status: DISCONTINUED | OUTPATIENT
Start: 2024-05-17 | End: 2024-05-17

## 2024-05-17 RX ORDER — HYDROMORPHONE HCL IN WATER/PF 6 MG/30 ML
0.4 PATIENT CONTROLLED ANALGESIA SYRINGE INTRAVENOUS EVERY 5 MIN PRN
Status: DISCONTINUED | OUTPATIENT
Start: 2024-05-17 | End: 2024-05-17 | Stop reason: HOSPADM

## 2024-05-17 RX ORDER — ONDANSETRON 2 MG/ML
4 INJECTION INTRAMUSCULAR; INTRAVENOUS
Status: COMPLETED | OUTPATIENT
Start: 2024-05-17 | End: 2024-05-17

## 2024-05-17 RX ADMIN — PROPOFOL 20 MG: 10 INJECTION, EMULSION INTRAVENOUS at 10:09

## 2024-05-17 RX ADMIN — LIDOCAINE HYDROCHLORIDE 100 MG: 20 INJECTION, SOLUTION INFILTRATION; PERINEURAL at 09:16

## 2024-05-17 RX ADMIN — HEPARIN SODIUM 5000 UNITS: 5000 INJECTION, SOLUTION INTRAVENOUS; SUBCUTANEOUS at 07:39

## 2024-05-17 RX ADMIN — GLYCOPYRROLATE 0.2 MG: 0.2 INJECTION, SOLUTION INTRAMUSCULAR; INTRAVENOUS at 09:44

## 2024-05-17 RX ADMIN — SODIUM CHLORIDE, POTASSIUM CHLORIDE, SODIUM LACTATE AND CALCIUM CHLORIDE: 600; 310; 30; 20 INJECTION, SOLUTION INTRAVENOUS at 10:23

## 2024-05-17 RX ADMIN — FAMOTIDINE 20 MG: 10 INJECTION, SOLUTION INTRAVENOUS at 09:12

## 2024-05-17 RX ADMIN — ONDANSETRON 4 MG: 2 INJECTION INTRAMUSCULAR; INTRAVENOUS at 09:12

## 2024-05-17 RX ADMIN — MIDAZOLAM 2 MG: 1 INJECTION INTRAMUSCULAR; INTRAVENOUS at 09:12

## 2024-05-17 RX ADMIN — PROPOFOL 100 MCG/KG/MIN: 10 INJECTION, EMULSION INTRAVENOUS at 09:16

## 2024-05-17 RX ADMIN — FENTANYL CITRATE 50 MCG: 50 INJECTION INTRAMUSCULAR; INTRAVENOUS at 09:32

## 2024-05-17 RX ADMIN — Medication 5 MG: at 09:48

## 2024-05-17 RX ADMIN — PROPOFOL 200 MG: 10 INJECTION, EMULSION INTRAVENOUS at 09:16

## 2024-05-17 RX ADMIN — DEXAMETHASONE SODIUM PHOSPHATE 8 MG: 4 INJECTION, SOLUTION INTRA-ARTICULAR; INTRALESIONAL; INTRAMUSCULAR; INTRAVENOUS; SOFT TISSUE at 09:29

## 2024-05-17 RX ADMIN — HYDROMORPHONE HYDROCHLORIDE 0.5 MG: 1 INJECTION, SOLUTION INTRAMUSCULAR; INTRAVENOUS; SUBCUTANEOUS at 10:10

## 2024-05-17 RX ADMIN — FENTANYL CITRATE 50 MCG: 50 INJECTION INTRAMUSCULAR; INTRAVENOUS at 09:16

## 2024-05-17 RX ADMIN — SUGAMMADEX 400 MG: 100 INJECTION, SOLUTION INTRAVENOUS at 10:07

## 2024-05-17 RX ADMIN — HYDROCODONE BITARTRATE AND ACETAMINOPHEN 1 TABLET: 5; 325 TABLET ORAL at 10:48

## 2024-05-17 RX ADMIN — ACETAMINOPHEN 975 MG: 325 TABLET, FILM COATED ORAL at 07:41

## 2024-05-17 RX ADMIN — Medication 10 MG: at 09:45

## 2024-05-17 RX ADMIN — ROCURONIUM BROMIDE 40 MG: 50 INJECTION, SOLUTION INTRAVENOUS at 09:16

## 2024-05-17 RX ADMIN — Medication 2 G: at 09:10

## 2024-05-17 RX ADMIN — SODIUM CHLORIDE, POTASSIUM CHLORIDE, SODIUM LACTATE AND CALCIUM CHLORIDE: 600; 310; 30; 20 INJECTION, SOLUTION INTRAVENOUS at 07:48

## 2024-05-17 ASSESSMENT — LIFESTYLE VARIABLES: TOBACCO_USE: 1

## 2024-05-17 ASSESSMENT — ACTIVITIES OF DAILY LIVING (ADL)
ADLS_ACUITY_SCORE: 35

## 2024-05-17 NOTE — OR NURSING
Discharge instructions was given to patient and her significant other Dylon. Patient and fiance,  both verbalized understanding. No questions and concerns at this time.

## 2024-05-17 NOTE — ANESTHESIA CARE TRANSFER NOTE
Patient: Tram Bal    Procedure: Procedure(s):  Robotic assisted laparoscopic removal of adjustable gastric band       Diagnosis: Chronic GERD [K21.9]  History of adjustable gastric banding [Z98.84]  Diagnosis Additional Information: No value filed.    Anesthesia Type:   General     Note:    Oropharynx: oropharynx clear of all foreign objects  Level of Consciousness: awake  Oxygen Supplementation: face mask  Level of Supplemental Oxygen (L/min / FiO2): 10  Independent Airway: airway patency satisfactory and stable  Dentition: dentition unchanged  Vital Signs Stable: post-procedure vital signs reviewed and stable  Report to RN Given: handoff report given  Patient transferred to: PACU    Handoff Report: Identifed the Patient, Identified the Reponsible Provider, Reviewed the pertinent medical history, Discussed the surgical course, Reviewed Intra-OP anesthesia mangement and issues during anesthesia, Set expectations for post-procedure period and Allowed opportunity for questions and acknowledgement of understanding      Vitals:  Vitals Value Taken Time   BP     Temp     Pulse 76 05/17/24 1022   Resp 18 05/17/24 1022   SpO2 97 % 05/17/24 1022   Vitals shown include unfiled device data.    Electronically Signed By: DEON Eaton CRNA  May 17, 2024  10:24 AM

## 2024-05-17 NOTE — ANESTHESIA PREPROCEDURE EVALUATION
Anesthesia Pre-Procedure Evaluation    Patient: Tram Bal   MRN: [id number] : 1985        Procedure : Procedure(s):  Robotic assisted laparoscopic removal of adjustable gastric band          Past Medical History:   Diagnosis Date     Abnormal Pap smear of cervix 2004    , , , ,      Cervical high risk HPV (human papillomavirus) test positive 2017     Depressive disorder 2016     Displacement of lumbar intervertebral disc 05/15/2008     Esophageal reflux      Hx of colposcopy with cervical biopsy 2017:[facility]- Benign.      Thyroid disease       Past Surgical History:   Procedure Laterality Date     ABDOMEN SURGERY  3/2023    Tummy tuck     COLPOSCOPY,LOOP ELECTRD CERVIX EXCIS  2005    JARETH III     GI SURGERY  2011    LAP BANDING for obesity, pre-DM     ZZC NONSPECIFIC PROCEDURE      Oral surgery      Allergies   Allergen Reactions     Famvir [Famciclovir] Hives      Social History     Tobacco Use     Smoking status: Former     Current packs/day: 0.00     Types: Cigarettes     Quit date: 2007     Years since quittin.0     Smokeless tobacco: Never     Tobacco comments:     quit smoking may 2010    Substance Use Topics     Alcohol use: Yes     Comment: occasionally      Wt Readings from Last 1 Encounters:   24 76.7 kg (169 lb)        Anesthesia Evaluation   Pt has had prior anesthetic.     No history of anesthetic complications       ROS/MED HX  ENT/Pulmonary:     (+)                tobacco use, Past use,                    (-) sleep apnea   Neurologic:  - neg neurologic ROS     Cardiovascular:  - neg cardiovascular ROS  (-) hypertension   METS/Exercise Tolerance:     Hematologic:       Musculoskeletal:       GI/Hepatic: Comment: S/p gastric band    malabsorption    (+) GERD,                   Renal/Genitourinary:  - neg Renal ROS     Endo:     (+)          thyroid problem, hyperthyroidism,           Psychiatric/Substance  Use:     (+) psychiatric history depression and anxiety       Infectious Disease:       Malignancy:       Other:            Physical Exam    Airway        Mallampati: II   TM distance: > 3 FB   Neck ROM: full   Mouth opening: > 3 cm    Respiratory Devices and Support         Dental       (+) Minor Abnormalities - some fillings, tiny chips      Cardiovascular   cardiovascular exam normal          Pulmonary   pulmonary exam normal            OUTSIDE LABS:  CBC:   Lab Results   Component Value Date    WBC 7.0 05/07/2024    WBC 8.6 08/02/2023    HGB 12.1 05/07/2024    HGB 11.9 08/02/2023    HCT 37.2 05/07/2024    HCT 37.3 08/02/2023     (H) 05/07/2024     (H) 08/02/2023     BMP:   Lab Results   Component Value Date     12/21/2023     11/06/2022    POTASSIUM 4.0 12/21/2023    POTASSIUM 3.8 11/06/2022    CHLORIDE 103 12/21/2023    CHLORIDE 108 11/06/2022    CO2 20 (L) 12/21/2023    CO2 22 11/06/2022    BUN 10.1 12/21/2023    BUN 16 11/06/2022    CR 0.64 12/21/2023    CR 0.61 11/06/2022     (H) 12/21/2023    GLC 92 11/06/2022     COAGS:   Lab Results   Component Value Date    INR 0.95 08/09/2010     POC:   Lab Results   Component Value Date    HCG Negative 01/31/2008     HEPATIC:   Lab Results   Component Value Date    ALBUMIN 4.2 12/21/2023    PROTTOTAL 7.4 12/21/2023    ALT 25 12/21/2023    AST 21 12/21/2023    ALKPHOS 88 12/21/2023    BILITOTAL 0.3 12/21/2023     OTHER:   Lab Results   Component Value Date    A1C 5.4 05/07/2024    HANK 9.1 12/21/2023    MAG 1.9 04/20/2022    TSH 1.00 05/07/2024    T4 0.93 05/07/2024    T3 134 05/07/2024       Anesthesia Plan    ASA Status:  2    NPO Status:  NPO Appropriate    Anesthesia Type: General.     - Airway: ETT   Induction: Intravenous, Propofol.   Maintenance: Balanced.        Consents    Anesthesia Plan(s) and associated risks, benefits, and realistic alternatives discussed. Questions answered and patient/representative(s) expressed  "understanding.     - Discussed:     - Discussed with:  Patient            Postoperative Care    Pain management: IV analgesics.   PONV prophylaxis: Ondansetron (or other 5HT-3), Background Propofol Infusion     Comments:               Jorge Chavez MD    I have reviewed the pertinent notes and labs in the chart from the past 30 days and (re)examined the patient.  Any updates or changes from those notes are reflected in this note.              # Overweight: Estimated body mass index is 28.12 kg/m  as calculated from the following:    Height as of 5/7/24: 1.651 m (5' 5\").    Weight as of 5/7/24: 76.7 kg (169 lb).      "

## 2024-05-17 NOTE — ANESTHESIA POSTPROCEDURE EVALUATION
Patient: Tram Bal    Procedure: Procedure(s):  Robotic assisted laparoscopic removal of adjustable gastric band       Anesthesia Type:  General    Note:     Postop Pain Control: Uneventful            Sign Out: Well controlled pain   PONV: No   Neuro/Psych: Uneventful            Sign Out: Acceptable/Baseline neuro status   Airway/Respiratory: Uneventful            Sign Out: Acceptable/Baseline resp. status   CV/Hemodynamics: Uneventful            Sign Out: Acceptable CV status; No obvious hypovolemia; No obvious fluid overload   Other NRE: NONE   DID A NON-ROUTINE EVENT OCCUR? No           Last vitals:  Vitals Value Taken Time   /82 05/17/24 1032   Temp 36.1  C (97  F) 05/17/24 1022   Pulse 69 05/17/24 1036   Resp 18 05/17/24 1036   SpO2 93 % 05/17/24 1036   Vitals shown include unfiled device data.    Electronically Signed By: Jorge Chavez MD  May 17, 2024  10:38 AM

## 2024-05-17 NOTE — OP NOTE
Preoperative diagnosis: Prior history of laparoscopic placement of adjustable gastric band.  Intractable heartburn, dysphagia and epigastric abdominal pain despite band being completely deflated.    Postoperative diagnosis: Same    Procedure: Robotic assisted laparoscopic removal of adjustable gastric band including access port    Surgeon: Darrel Zaragoza MD    Assistant: Zulema Fisher PA-C, Physician assistant first assistant was necessary during the performance of this procedure for expertise in patient positioning, prepping, draping, trocar placement, camera management, retraction and exposure, and suctioning.    Anesthesia: GEN endotracheal    Estimated blood loss: 5 cc    Specimens: None, band all tubing and access port removed completely and and discarded    Indication for procedure: This is a 38-year-old female with a distant history of laparoscopic placement of adjustable gastric band.  She has had good long-term and durable success with weight loss.  Unfortunately she has developed significant issues with chronic heartburn/GERD, chronic epigastric abdominal discomfort, and dysphagia.  With increasing concerns about long-term risk of erosion and the above-mentioned symptoms it has been elected to proceed with removal of the band.  The potential risks of bleeding, infection, ongoing difficulties with swallowing etc. were all reviewed in detail.  Her questions were answered and she wished to proceed.    Procedure: After informed consent was obtained the patient was taken to the operating room and placed supine in the operating room table.  Following the induction of adequate general endotracheal anesthesia, the abdomen was prepped and draped in usual manner.  Surgeon initiated timeout was completed 2 g of IV Ancef were administered.  A stab incision was made in one of her old incision sites in the right mid abdomen.  Through this incision a 5 mm optical trocar was used to gain access to the abdominal  cavity.  Carbon dioxide pneumoperitoneum was established.  Skin incision was then made along one of the other old scars just to the left of the umbilicus and dissection was carried in the subcutaneous tissues.  The access port was encountered.  The fibrinous sheath around it was opened.  The port was then removed from the fascia and the tubing cut.  The port was then completely freed and was discarded.  Through this site an 8 mm robotic port was introduced.  Under direct vision another 8 mm robotic port was placed in the left upper abdomen.  The initial 5 mm port was then exchanged for an 8 mm robotic port.  Under direct vision a Sujey liver retractor was introduced through a subxiphoid stab incision and used to elevate the left lobe of the liver.  I could then visualize the band itself.  The robot was uneventfully docked and I assume control of the surgeon console.  Using a combination of sharp as well as electrocautery dissection adhesions to the undersurface of the left lobe of the liver were taken down.  The band was mobile and easy to rotate.  I did incise the fibrinous sheath around the band and encountered the buckle.  The buckle was cut such that the band could then be completely removed.  This was set aside within the abdominal cavity.  The fibrinous sheath associated with the band was then opened in 2 locations and spread.  The surgical area was hemostatic.  The band was then removed through one of our port sites.  The trocars were removed.  Carbon dioxide was massaged from the abdomen.  Local anesthetic was injected.  Incisions were closed with 4-0 Vicryl subcuticular.  The port site itself was closed in a 2 layer fashion using 3-0 Vicryl and 4-0 Vicryl.  Steri-Strips and dressings were applied.  This was tolerated without difficulty.  The patient was awakened in the operating room, extubated and taken to recovery room in a stable condition.    Darrel Zaragoza MD

## 2024-05-17 NOTE — ANESTHESIA PROCEDURE NOTES
Airway       Patient location during procedure: OR       Procedure Start/Stop Times: 5/17/2024 9:20 AM  Staff -        Anesthesiologist:  Jorge Chavez MD       CRNA: Elisabet Wilson APRN CRNA       Other Anesthesia Staff: James Wang       Performed By: SRNA  Consent for Airway        Urgency: elective  Indications and Patient Condition       Indications for airway management: laisha-procedural       Induction type:intravenous       Mask difficulty assessment: 2 - vent by mask + OA or adjuvant +/- NMBA    Final Airway Details       Final airway type: endotracheal airway       Successful airway: ETT - single  Endotracheal Airway Details        ETT size (mm): 7.0       Cuffed: yes       Cuff volume (mL): 10       Successful intubation technique: direct laryngoscopy       DL Blade Type: MAC 3       Grade View of Cords: 1       Adjucts: stylet       Position: Right       Measured from: lips       Secured at (cm): 23       Bite block used: None    Post intubation assessment        Placement verified by: capnometry, equal breath sounds and chest rise        Number of attempts at approach: 1       Number of other approaches attempted: 0       Secured with: tape       Ease of procedure: easy       Dentition: Intact and Unchanged    Medication(s) Administered   Medication Administration Time: 5/17/2024 9:20 AM

## 2024-06-04 ENCOUNTER — TELEPHONE (OUTPATIENT)
Dept: SURGERY | Facility: CLINIC | Age: 39
End: 2024-06-04
Payer: COMMERCIAL

## 2024-06-04 NOTE — TELEPHONE ENCOUNTER
[facility]  Post op call note  June 4, 2024       Tram Bal was called for an update regarding her recovery.  There was no answer and a message was left encouraging her to call us back with any questions, concerns, or to provide an update.        Zulema Fisher PA-C  [facility]  [phone]

## 2024-07-22 DIAGNOSIS — E05.00 GRAVES DISEASE: ICD-10-CM

## 2024-07-22 RX ORDER — PROPRANOLOL HYDROCHLORIDE 10 MG/1
10 TABLET ORAL 2 TIMES DAILY
Qty: 180 TABLET | Refills: 2 | Status: SHIPPED | OUTPATIENT
Start: 2024-07-22

## 2024-08-15 ENCOUNTER — TELEPHONE (OUTPATIENT)
Dept: FAMILY MEDICINE | Facility: CLINIC | Age: 39
End: 2024-08-15
Payer: COMMERCIAL

## 2024-08-15 NOTE — TELEPHONE ENCOUNTER
Reason for Call:  Appointment Request    Patient requesting this type of appt: Procedure: Colposcopy    Requested provider:  Batool Liang    Reason patient unable to be scheduled: Needs to be scheduled by clinic    When does patient want to be seen/preferred time:  As soon as there is a spot available.    Comments: Patient would like to have a colposcopy scheduled ASAP.    Could we send this information to you in [employer] or would you prefer to receive a phone call?:   Patient would like to be contacted via [employer] or a call back either one.    Call taken on 8/15/2024 at 2:18 PM by Domingo Dunham

## 2024-08-17 DIAGNOSIS — B00.9 HSV (HERPES SIMPLEX VIRUS) INFECTION: ICD-10-CM

## 2024-08-19 RX ORDER — ACYCLOVIR 800 MG/1
800 TABLET ORAL 3 TIMES DAILY
Qty: 21 TABLET | Refills: 2 | Status: SHIPPED | OUTPATIENT
Start: 2024-08-19

## 2024-08-19 NOTE — TELEPHONE ENCOUNTER
Sorry the pharmacies are starting to give us calls on these. Please indicate that this is a PRN medication and how many day patient should be taking it.    Subha Almaguer RN on 8/19/2024 at 3:36 PM

## 2024-08-25 ENCOUNTER — HEALTH MAINTENANCE LETTER (OUTPATIENT)
Age: 39
End: 2024-08-25

## 2024-08-28 ENCOUNTER — HOSPITAL ENCOUNTER (OUTPATIENT)
Dept: MAMMOGRAPHY | Facility: CLINIC | Age: 39
Discharge: HOME | End: 2024-08-28
Attending: NURSE PRACTITIONER | Admitting: NURSE PRACTITIONER
Payer: COMMERCIAL

## 2024-08-28 DIAGNOSIS — Z12.31 VISIT FOR SCREENING MAMMOGRAM: ICD-10-CM

## 2024-08-28 PROCEDURE — 77067 SCR MAMMO BI INCL CAD: CPT

## 2024-09-09 ASSESSMENT — PATIENT HEALTH QUESTIONNAIRE - PHQ9
SUM OF ALL RESPONSES TO PHQ QUESTIONS 1-9: 9
10. IF YOU CHECKED OFF ANY PROBLEMS, HOW DIFFICULT HAVE THESE PROBLEMS MADE IT FOR YOU TO DO YOUR WORK, TAKE CARE OF THINGS AT HOME, OR GET ALONG WITH OTHER PEOPLE: VERY DIFFICULT

## 2024-09-10 ENCOUNTER — OFFICE VISIT (OUTPATIENT)
Dept: FAMILY MEDICINE | Facility: CLINIC | Age: 39
End: 2024-09-10
Payer: COMMERCIAL

## 2024-09-10 VITALS
HEART RATE: 76 BPM | TEMPERATURE: 98 F | BODY MASS INDEX: 31.92 KG/M2 | OXYGEN SATURATION: 97 % | RESPIRATION RATE: 18 BRPM | WEIGHT: 187 LBS | DIASTOLIC BLOOD PRESSURE: 78 MMHG | SYSTOLIC BLOOD PRESSURE: 112 MMHG | HEIGHT: 64 IN

## 2024-09-10 DIAGNOSIS — E61.1 IRON DEFICIENCY: ICD-10-CM

## 2024-09-10 DIAGNOSIS — Z13.220 LIPID SCREENING: ICD-10-CM

## 2024-09-10 DIAGNOSIS — K91.2 POSTSURGICAL MALABSORPTION: ICD-10-CM

## 2024-09-10 DIAGNOSIS — E55.9 VITAMIN D DEFICIENCY: ICD-10-CM

## 2024-09-10 DIAGNOSIS — R87.810 CERVICAL HIGH RISK HPV (HUMAN PAPILLOMAVIRUS) TEST POSITIVE: Primary | ICD-10-CM

## 2024-09-10 LAB
CHOLEST SERPL-MCNC: 226 MG/DL
ERYTHROCYTE [DISTWIDTH] IN BLOOD BY AUTOMATED COUNT: 14.8 % (ref 10–15)
FASTING STATUS PATIENT QL REPORTED: NO
FERRITIN SERPL-MCNC: 11 NG/ML (ref 6–175)
HCT VFR BLD AUTO: 38.3 % (ref 35–47)
HDLC SERPL-MCNC: 91 MG/DL
HGB BLD-MCNC: 12.4 G/DL (ref 11.7–15.7)
IRON BINDING CAPACITY (ROCHE): 529 UG/DL (ref 240–430)
IRON SATN MFR SERPL: 18 % (ref 15–46)
IRON SERPL-MCNC: 93 UG/DL (ref 37–145)
LDLC SERPL CALC-MCNC: 84 MG/DL
MCH RBC QN AUTO: 26.7 PG (ref 26.5–33)
MCHC RBC AUTO-ENTMCNC: 32.4 G/DL (ref 31.5–36.5)
MCV RBC AUTO: 83 FL (ref 78–100)
NONHDLC SERPL-MCNC: 135 MG/DL
PLATELET # BLD AUTO: 458 10E3/UL (ref 150–450)
RBC # BLD AUTO: 4.64 10E6/UL (ref 3.8–5.2)
TRIGL SERPL-MCNC: 254 MG/DL
VIT B12 SERPL-MCNC: 374 PG/ML (ref 232–1245)
VIT D+METAB SERPL-MCNC: 38 NG/ML (ref 20–50)
WBC # BLD AUTO: 7.4 10E3/UL (ref 4–11)

## 2024-09-10 PROCEDURE — 82728 ASSAY OF FERRITIN: CPT | Performed by: NURSE PRACTITIONER

## 2024-09-10 PROCEDURE — 99214 OFFICE O/P EST MOD 30 MIN: CPT | Performed by: NURSE PRACTITIONER

## 2024-09-10 PROCEDURE — 83540 ASSAY OF IRON: CPT | Performed by: NURSE PRACTITIONER

## 2024-09-10 PROCEDURE — 36415 COLL VENOUS BLD VENIPUNCTURE: CPT | Performed by: NURSE PRACTITIONER

## 2024-09-10 PROCEDURE — 80061 LIPID PANEL: CPT | Performed by: NURSE PRACTITIONER

## 2024-09-10 PROCEDURE — 83550 IRON BINDING TEST: CPT | Performed by: NURSE PRACTITIONER

## 2024-09-10 PROCEDURE — 82306 VITAMIN D 25 HYDROXY: CPT | Performed by: NURSE PRACTITIONER

## 2024-09-10 PROCEDURE — 82607 VITAMIN B-12: CPT | Performed by: NURSE PRACTITIONER

## 2024-09-10 PROCEDURE — 85027 COMPLETE CBC AUTOMATED: CPT | Performed by: NURSE PRACTITIONER

## 2024-09-10 PROCEDURE — G2211 COMPLEX E/M VISIT ADD ON: HCPCS | Performed by: NURSE PRACTITIONER

## 2024-09-10 ASSESSMENT — PAIN SCALES - GENERAL: PAINLEVEL: NO PAIN (0)

## 2024-09-10 NOTE — PROGRESS NOTES
Assessment & Plan     Cervical high risk HPV (human papillomavirus) test positive  Interested  permanent solution for HPV, having to do colposcopies yearly.  Not interested in having additional children.  OB/GYN referral placed  - Ob/Gyn  Referral; Future    Iron deficiency  Ferritin low on labs, replacement recommended.  - Iron and iron binding capacity; Future  - Ferritin; Future  - CBC with platelets; Future  - Iron and iron binding capacity  - Ferritin  - CBC with platelets  - Ferritin; Future    Vitamin D deficiency  Normal on labs    Postsurgical malabsorption  Ongoing low normal vitamin B-12 despite replacement, recommend injections to bring levels up.  Will start with once weekly injections for 4 weeks followed by monthly injections and repeat labs in 3 to 4 months.  - Vitamin B12; Future  - Vitamin D Deficiency; Future  - Vitamin B12  - Vitamin D Deficiency  - cyanocobalamin injection 1,000 mcg  - cyanocobalamin injection 1,000 mcg  - Vitamin B12; Future    Lipid screening    - Lipid panel reflex to direct LDL Non-fasting; Future  - Lipid panel reflex to direct LDL Non-fasting    The longitudinal plan of care for the diagnosis(es)/condition(s) as documented were addressed during this visit. Due to the added complexity in care, I will continue to support Tram in the subsequent management and with ongoing continuity of care.    Gael Ugalde is a 39 year old, presenting for the following health issues:  LAB REQUEST (Would like B12 level checked. Pt feels like has been very fatigued lately, on going a couple years now. Started around thyroid, waited for thyroid meds to level out but still feeling very fatigued. ) and Cervical  (Pt does have a coloscopy scheduled with Dr. Liang coming up. Wondering options on hysterectomy or tubes tied. )        9/10/2024    10:37 AM   Additional Questions   Roomed by Michelle PORTILLO   Accompanied by self         9/10/2024    10:37 AM   Patient Reported  "Additional Medications   Patient reports taking the following new medications no new meds     History of Present Illness       Reason for visit:  Im interested in B12 injections for energy    She eats 0-1 servings of fruits and vegetables daily.She consumes 1 sweetened beverage(s) daily.She exercises with enough effort to increase her heart rate 9 or less minutes per day.  She exercises with enough effort to increase her heart rate 3 or less days per week.   She is taking medications regularly.        Review of Systems  Constitutional, HEENT, cardiovascular, pulmonary, gi and gu systems are negative, except as otherwise noted.      Objective    /78   Pulse 76   Temp 98  F (36.7  C) (Tympanic)   Resp 18   Ht 1.626 m (5' 4\")   Wt 84.8 kg (187 lb)   LMP 08/06/2024   SpO2 97%   BMI 32.10 kg/m    Body mass index is 32.1 kg/m .  Physical Exam   GENERAL: alert and no distress  NECK: no adenopathy, no asymmetry, masses, or scars  PSYCH: mentation appears normal, affect normal/bright    Results for orders placed or performed in visit on 09/10/24   Lipid panel reflex to direct LDL Non-fasting     Status: Abnormal   Result Value Ref Range    Cholesterol 226 (H) <200 mg/dL    Triglycerides 254 (H) <150 mg/dL    Direct Measure HDL 91 >=50 mg/dL    LDL Cholesterol Calculated 84 <100 mg/dL    Non HDL Cholesterol 135 (H) <130 mg/dL    Patient Fasting > 8hrs? No     Narrative    Cholesterol  Desirable: < 200 mg/dL  Borderline High: 200 - 239 mg/dL  High: >= 240 mg/dL    Triglycerides  Normal: < 150 mg/dL  Borderline High: 150 - 199 mg/dL  High: 200-499 mg/dL  Very High: >= 500 mg/dL    Direct Measure HDL  Female: >= 50 mg/dL   Male: >= 40 mg/dL    LDL Cholesterol  Desirable: < 100 mg/dL  Above Desirable: 100 - 129 mg/dL   Borderline High: 130 - 159 mg/dL   High:  160 - 189 mg/dL   Very High: >= 190 mg/dL    Non HDL Cholesterol  Desirable: < 130 mg/dL  Above Desirable: 130 - 159 mg/dL  Borderline High: 160 - 189 " mg/dL  High: 190 - 219 mg/dL  Very High: >= 220 mg/dL   Vitamin B12     Status: Normal   Result Value Ref Range    Vitamin B12 374 232 - 1,245 pg/mL   Iron and iron binding capacity     Status: Abnormal   Result Value Ref Range    Iron 93 37 - 145 ug/dL    Iron Binding Capacity 529 (H) 240 - 430 ug/dL    Iron Sat Index 18 15 - 46 %   Ferritin     Status: Normal   Result Value Ref Range    Ferritin 11 6 - 175 ng/mL   CBC with platelets     Status: Abnormal   Result Value Ref Range    WBC Count 7.4 4.0 - 11.0 10e3/uL    RBC Count 4.64 3.80 - 5.20 10e6/uL    Hemoglobin 12.4 11.7 - 15.7 g/dL    Hematocrit 38.3 35.0 - 47.0 %    MCV 83 78 - 100 fL    MCH 26.7 26.5 - 33.0 pg    MCHC 32.4 31.5 - 36.5 g/dL    RDW 14.8 10.0 - 15.0 %    Platelet Count 458 (H) 150 - 450 10e3/uL   Vitamin D Deficiency     Status: Normal   Result Value Ref Range    Vitamin D, Total (25-Hydroxy) 38 20 - 50 ng/mL    Narrative    Season, race, dietary intake, and treatment affect the concentration of 25-hydroxy-Vitamin D. Values may decrease during winter months and increase during summer months.    Vitamin D determination is routinely performed by an immunoassay specific for 25 hydroxyvitamin D3.  If an individual is on vitamin D2(ergocalciferol) supplementation, please specify 25 OH vitamin D2 and D3 level determination by LCMSMS test [id number].             Signed Electronically by: DEON Torres CNP

## 2024-09-12 RX ORDER — CYANOCOBALAMIN 1000 UG/ML
1000 INJECTION, SOLUTION INTRAMUSCULAR; SUBCUTANEOUS
Status: DISCONTINUED | OUTPATIENT
Start: 2024-09-12 | End: 2024-09-12

## 2024-09-12 RX ORDER — CYANOCOBALAMIN 1000 UG/ML
1000 INJECTION, SOLUTION INTRAMUSCULAR; SUBCUTANEOUS
Status: ACTIVE | OUTPATIENT
Start: 2024-09-13 | End: 2025-09-07

## 2024-09-12 RX ORDER — CYANOCOBALAMIN 1000 UG/ML
1000 INJECTION, SOLUTION INTRAMUSCULAR; SUBCUTANEOUS
Status: ACTIVE | OUTPATIENT
Start: 2024-09-13 | End: 2024-10-10

## 2024-09-18 ENCOUNTER — ALLIED HEALTH/NURSE VISIT (OUTPATIENT)
Dept: FAMILY MEDICINE | Facility: CLINIC | Age: 39
End: 2024-09-18
Payer: COMMERCIAL

## 2024-09-18 DIAGNOSIS — E55.9 VITAMIN D DEFICIENCY: Primary | ICD-10-CM

## 2024-09-18 PROCEDURE — 99207 PR NO CHARGE NURSE ONLY: CPT

## 2024-09-18 PROCEDURE — 96372 THER/PROPH/DIAG INJ SC/IM: CPT | Performed by: NURSE PRACTITIONER

## 2024-09-18 RX ADMIN — CYANOCOBALAMIN 1000 MCG: 1000 INJECTION, SOLUTION INTRAMUSCULAR; SUBCUTANEOUS at 09:34

## 2024-09-18 NOTE — PROGRESS NOTES
"COLPOSCOPY    Tram Bal is a 39 year old  who presents for colposcopic evaluation of an abnormal Pap smear.  Patient understands importance of colposcopy but is frustrated by years of procedures, wondering if she can proceed with definitive management.    Previous Pap History:      LSIL   10/04 [place] JARETH I   LSIL   [place] JARETH II & III   LEEP JARETH III   NIL pap   ASCUS, neg HPV  10/06, ,  NIL paps  1/10 ASCUS, neg HPV  , ,  NIL paps  3/31/14 NIL/neg HPV. Plan cotest in 1 year.  4/20/15 NIL/neg HPV. Plan: cotest in 3 years.  16 ASCUS, Neg HPV. Cotest in 1 year.   17 ASCUS, +High Risk HPV (Neg 16/18).  Plan [place]  17 [place] Benign. ASCUS pap, +HR HPV (Not types 16/18). Plan cotest in 1 year.   10/26/18 NIL Pap, + HR HPV (Neg 16/18). Plan colp  19 [place] ECC - atypia, favor reactive change.  NIL pap, + HR HPV (not 16 or 18). Plan: cotest in 1 yr   20 NIL Pap, + HR HPV (neg 16/18). [place] due by 2021 [place] ECC - no JARETH. Plan: cotest in 1 year  10/10/22 NIL Pap, HPV + (other). Plan colp  23 [place] negative  24 NIL Pap, HPV + (18 and other). Plan colp    /84 (BP Location: Left arm, Patient Position: Sitting, Cuff Size: Adult Regular)   Pulse 74   Temp 98.2  F (36.8  C)   Resp 16   Ht 1.626 m (5' 4\")   Wt 84.7 kg (186 lb 12.8 oz)   LMP 2024 (Exact Date)   BMI 32.06 kg/m      Procedure:    After informed consent was obtained, a speculum was placed in the patient's vagina and the cervix was visualized.  A dilute solution of acetic acid was applied to the cervix.    Colposcopic findings:    Unremarkable vaginal fornices and vault.  Colposcopy: inadequate, could not see TZ    Abnormal findings: none    Biopsies were not performed.  ECC was performed.  Hemostasis visualized.    Patient tolerated procedure well.  No complications.    Assessment/Plan: Colposcopy for abnormal Pap with above findings  1) Will notify " patient of pathology results when available.  2) Final recommendations to follow. If JARETH I or less, repeat Pap and HPV in 1 year. If other, will contact patient with treatment plan.  3) Definitive management: discussed repeat LEEP vs hysterectomy.  Hysterectomy would remove cervix, which is site where HPV typically causes majority of cancers.  However, discussed that hysterectomy may not rid patient of HPV.  Would still need to be followed with vaginal Paps in that case, could need colposcopy if cytology abnormal.  Patient expresses understanding, will schedule hysterectomy consult with one of my partners.  I also discussed HPV vaccination as additional measure to see if helps patient clear HPV.  She will consider.    Mar Monique MD

## 2024-09-19 ENCOUNTER — OFFICE VISIT (OUTPATIENT)
Dept: OBGYN | Facility: CLINIC | Age: 39
End: 2024-09-19
Payer: COMMERCIAL

## 2024-09-19 VITALS
TEMPERATURE: 98.2 F | BODY MASS INDEX: 31.89 KG/M2 | DIASTOLIC BLOOD PRESSURE: 84 MMHG | HEART RATE: 74 BPM | HEIGHT: 64 IN | RESPIRATION RATE: 16 BRPM | WEIGHT: 186.8 LBS | SYSTOLIC BLOOD PRESSURE: 123 MMHG

## 2024-09-19 DIAGNOSIS — D06.9 CIN III (CERVICAL INTRAEPITHELIAL NEOPLASIA III): ICD-10-CM

## 2024-09-19 DIAGNOSIS — R87.618 PAP SMEAR ABNORMALITY OF CERVIX/HUMAN PAPILLOMAVIRUS (HPV) POSITIVE: ICD-10-CM

## 2024-09-19 DIAGNOSIS — R87.810 CERVICAL HIGH RISK HUMAN PAPILLOMAVIRUS (HPV) DNA TEST POSITIVE: Primary | ICD-10-CM

## 2024-09-19 LAB
HCG UR QL: NEGATIVE
INTERNAL QC OK POCT: NORMAL
POCT KIT EXPIRATION DATE: NORMAL
POCT KIT LOT NUMBER: NORMAL

## 2024-09-19 PROCEDURE — 81025 URINE PREGNANCY TEST: CPT | Performed by: OBSTETRICS & GYNECOLOGY

## 2024-09-19 PROCEDURE — 57456 ENDOCERV CURETTAGE W/SCOPE: CPT | Performed by: OBSTETRICS & GYNECOLOGY

## 2024-09-19 PROCEDURE — 88305 TISSUE EXAM BY PATHOLOGIST: CPT | Performed by: PATHOLOGY

## 2024-09-25 ENCOUNTER — ALLIED HEALTH/NURSE VISIT (OUTPATIENT)
Dept: FAMILY MEDICINE | Facility: CLINIC | Age: 39
End: 2024-09-25
Payer: COMMERCIAL

## 2024-09-25 DIAGNOSIS — Z23 IMMUNIZATION DUE: Primary | ICD-10-CM

## 2024-09-25 DIAGNOSIS — E61.1 IRON DEFICIENCY: ICD-10-CM

## 2024-09-25 PROCEDURE — 96372 THER/PROPH/DIAG INJ SC/IM: CPT | Performed by: NURSE PRACTITIONER

## 2024-09-25 PROCEDURE — 99207 PR NO CHARGE NURSE ONLY: CPT

## 2024-09-25 RX ADMIN — CYANOCOBALAMIN 1000 MCG: 1000 INJECTION, SOLUTION INTRAMUSCULAR; SUBCUTANEOUS at 10:16

## 2024-09-26 ENCOUNTER — TELEPHONE (OUTPATIENT)
Dept: OBGYN | Facility: CLINIC | Age: 39
End: 2024-09-26

## 2024-09-26 ENCOUNTER — OFFICE VISIT (OUTPATIENT)
Dept: OBGYN | Facility: CLINIC | Age: 39
End: 2024-09-26
Payer: COMMERCIAL

## 2024-09-26 ENCOUNTER — PREP FOR PROCEDURE (OUTPATIENT)
Dept: OBGYN | Facility: CLINIC | Age: 39
End: 2024-09-26

## 2024-09-26 VITALS
DIASTOLIC BLOOD PRESSURE: 83 MMHG | SYSTOLIC BLOOD PRESSURE: 124 MMHG | WEIGHT: 189 LBS | BODY MASS INDEX: 32.44 KG/M2 | HEART RATE: 85 BPM | TEMPERATURE: 97.1 F

## 2024-09-26 DIAGNOSIS — R87.619 ABNORMAL CERVICAL PAPANICOLAOU SMEAR, UNSPECIFIED ABNORMAL PAP FINDING: Primary | ICD-10-CM

## 2024-09-26 DIAGNOSIS — Z01.812 PRE-PROCEDURE LAB EXAM: Primary | ICD-10-CM

## 2024-09-26 DIAGNOSIS — R87.810 CERVICAL HIGH RISK HPV (HUMAN PAPILLOMAVIRUS) TEST POSITIVE: ICD-10-CM

## 2024-09-26 PROCEDURE — 81025 URINE PREGNANCY TEST: CPT | Performed by: OBSTETRICS & GYNECOLOGY

## 2024-09-26 PROCEDURE — 88305 TISSUE EXAM BY PATHOLOGIST: CPT | Performed by: PATHOLOGY

## 2024-09-26 PROCEDURE — 58100 BIOPSY OF UTERUS LINING: CPT | Performed by: OBSTETRICS & GYNECOLOGY

## 2024-09-26 PROCEDURE — 99214 OFFICE O/P EST MOD 30 MIN: CPT | Mod: 25 | Performed by: OBSTETRICS & GYNECOLOGY

## 2024-09-26 RX ORDER — METRONIDAZOLE 500 MG/100ML
500 INJECTION, SOLUTION INTRAVENOUS
OUTPATIENT
Start: 2024-09-26

## 2024-09-26 RX ORDER — CEFAZOLIN 2 G/1
2 INJECTION, POWDER, FOR SOLUTION INTRAVENOUS SEE ADMIN INSTRUCTIONS
OUTPATIENT
Start: 2024-09-26

## 2024-09-26 RX ORDER — ACETAMINOPHEN 325 MG/1
975 TABLET ORAL ONCE
OUTPATIENT
Start: 2024-09-26 | End: 2024-09-26

## 2024-09-26 RX ORDER — CEFAZOLIN 2 G/1
2 INJECTION, POWDER, FOR SOLUTION INTRAVENOUS
OUTPATIENT
Start: 2024-09-26

## 2024-09-26 NOTE — TELEPHONE ENCOUNTER
"[id number]  Tram Bal    You are now scheduled for surgery at [facility].  Below are the details for your surgery.  Please read the \"Preparing for Your Surgery\" instructions and let us know if you have any questions.    Type of surgery: HYSTERECTOMY, TOTAL, LAPAROSCOPIC, bilateral salpingectomy, cystoscopy   Surgeon:  Nicole Coelho MD  Location of surgery: [facility] OR    Date of surgery:   11/13/24     Time:  10:45am         Arrival Time:   9:15am  Time can change, to be confirmed a couple of days prior by pre-op surgery nurse.    Pre-Op Appt Date: Patient to schedule with a PCP or Family Practice Provider within 30 days to the surgery.  Post-Op Appt Date:  12/23/24      Time: 11:30am    Packet sent out: Yes  Pre-cert/Authorization completed:  TBD by Financial Securing Office.   MA Sterilization/Hysterectomy Acknowledgment Consent signed: Yes consent signed 9/26/24    [facility]  [phone]    Fax: [phone]  Same Day Surgery [phone]  Fax: [phone]  [facility] [phone]    "

## 2024-09-26 NOTE — PROGRESS NOTES
Gynecology Consult Note      HPI: Tram Bal is a 39 year old who presents to discuss hysterectomy.  The patient states that she has a long history of abnormal Pap smears and she desires definitive management.  She  denies any issues with abnormal bleeding.  Is feeling complete with childbearing and does not desire future pregnancy.  She states that she understands the risks of surgery and wishes to proceed.    Pap hx  7/04 [facility]   10/04 [place] JARETH I  4/05 [facility]  6/05 [place] JARETH II & III  8/05 LEEP JARETH III  12/05 NIL pap  4/06 ASCUS, neg HPV  10/06, 11/07, 12/08 NIL paps  1/10 ASCUS, neg HPV  2011, 2012, 2013 NIL paps  3/31/14 NIL/neg HPV. Plan cotest in 1 year.  4/20/15 NIL/neg HPV. Plan: cotest in 3 years.  4/25/16 ASCUS, Neg HPV. Cotest in 1 year.   4/28/17 ASCUS, +High Risk HPV (Neg 16/18).  Plan [place]  5/22/17 [place] Benign. ASCUS pap, +HR HPV (Not types 16/18). Plan cotest in 1 year.   10/26/18 NIL Pap, + HR HPV (Neg 16/18). Plan colp  4/1/19 [place] ECC - atypia, favor reactive change.  NIL pap, + HR HPV (not 16 or 18). Plan: cotest in 1 yr   11/23/20 NIL Pap, + HR HPV (neg 16/18). [place] due by 2/23/2021 6/29/21 [place] [facility] - no JARETH. Plan: cotest in 1 year  8/8/22 Lost to follow-up for pap tracking   10/10/22 NIL pap, + HR HPV (not 16 or 18) colp by 1/10/23  1/6/23 [facility] neg. NIL pap, +HR HPV (not 16/18). Plan: cotest in 1 year   1/16/24 NIL pap, +HR HPV 18 & other. Plan: colposcopy due before 4/16/24 1/22/24 left message / mychart sent / mychart read  3/14/24 Reminder mychart  4/16/24 [place] not done. Tracking updated for 6 mo colp/pap due 7/16/24.    6/26/24 Reminder samanthahart -- read  7/29/24 Lost to follow-up for pap tracking   ROS: 10 pt ROS neg other than HPI    PMH:   Past Medical History:   Diagnosis Date    Abnormal Pap smear of cervix 2004    2005, 2006, 2010, 2016, 2017    Cervical high risk HPV (human papillomavirus) test positive 2017 2018, 2020    Depressive disorder 2016    Displacement of lumbar  intervertebral disc 05/15/2008    Esophageal reflux     Hx of colposcopy with cervical biopsy 05/22/2017 5/22/17:[place]- Ben[facility].     Thyroid disease 2022       PSHx:   Past Surgical History:   Procedure Laterality Date    ABDOMEN SURGERY  3/2023    Tummy tuck    COLPOSCOPY,LOOP ELECTRD CERVIX EXCIS  08/11/2005    JARETH III    GI SURGERY  04/14/2011    LAP BANDING for obesity, pre-DM    LAPAROSCOPIC REMOVAL GASTRIC ADJUSTABLE BAND N/A 5/17/2024    Procedure: Robotic assisted laparoscopic removal of adjustable gastric band;  Surgeon: Darrel Zaragoza MD;  Location:  OR    [facility] NONSPECIFIC PROCEDURE  1999    Oral surgery       Medications:   Current Outpatient Medications   Medication Sig Dispense Refill    acyclovir (ZOVIRAX) 800 MG tablet Take 1 tablet (800 mg) by mouth 3 times daily For 2 days, may repeat as necessary for outbreaks 21 tablet 2    clotrimazole (LOTRIMIN) 1 % external cream Apply topically 2 times daily 60 g 1    etonogestrel-ethinyl estradiol (ELURYNG) 0.12-0.015 MG/24HR vaginal ring INSERT 1 RING VAGINALLY FOR 21 DAYS, THEN REMOVE FOR 1 WEEK, THEN REPEAT WITH NEW RING 3 each 3    FLUoxetine (PROZAC) 40 MG capsule Take 1 capsule (40 mg) by mouth daily 90 capsule 3    hydrOXYzine (ATARAX) 25 MG tablet Take 0.5-1 tablets (12.5-25 mg) by mouth 3 times daily as needed for anxiety 30 tablet 1    loratadine (CLARITIN) 10 MG tablet Take 10 mg by mouth as needed for allergies      Multiple Vitamins-Iron TABS Take by mouth daily       omeprazole (PRILOSEC) 40 MG DR capsule TAKE 1 CAPSULE(40 MG) BY MOUTH DAILY 90 capsule 0    propranolol (INDERAL) 10 MG tablet TAKE 1 TABLET(10 MG) BY MOUTH TWICE DAILY 180 tablet 2     Current Facility-Administered Medications   Medication Dose Route Frequency Provider Last Rate Last Admin    cyanocobalamin injection 1,000 mcg  1,000 mcg Intramuscular Q7 Days    1,000 mcg at 09/18/24 0934    cyanocobalamin injection 1,000 mcg  1,000 mcg Intramuscular Q30 Days    1,000  [facility] at 24 1016        Allergies:      Allergies   Allergen Reactions    Famvir [Famciclovir] Hives       Social History:   Social History     Socioeconomic History    Marital status: Single     Spouse name: Not on file    Number of children: 2    Years of education: Not on file    Highest education level: Not on file   Occupational History     Employer: [employer]   Tobacco Use    Smoking status: Former     Current packs/day: 0.00     Types: Cigarettes     Quit date: 2007     Years since quittin.4    Smokeless tobacco: Never    Tobacco comments:     quit smoking may 2010    Vaping Use    Vaping status: Never Used   Substance and Sexual Activity    Alcohol use: Yes     Comment: occasionally    Drug use: No    Sexual activity: Yes     Partners: Male     Birth control/protection: Inserts/Ring     Comment: Nuva ring   Other Topics Concern    Parent/sibling w/ CABG, MI or angioplasty before 65F 55M? No   Social History Narrative    Single with 2 kids ages 7 and 4.  Works as a vet tech     Social Determinants of Health     Financial Resource Strain: Low Risk  (1/15/2024)    Financial Resource Strain     Within the past 12 months, have you or your family members you live with been unable to get utilities (heat, electricity) when it was really needed?: No   Food Insecurity: Low Risk  (1/15/2024)    Food Insecurity     Within the past 12 months, did you worry that your food would run out before you got money to buy more?: No     Within the past 12 months, did the food you bought just not last and you didn t have money to get more?: No   Transportation Needs: Low Risk  (1/15/2024)    Transportation Needs     Within the past 12 months, has lack of transportation kept you from medical appointments, getting your medicines, non-medical meetings or appointments, work, or from getting things that you need?: No   Physical Activity: Not on file   Stress: Not on file   Social Connections: Not on file    Interpersonal Safety: Low Risk  (9/10/2024)    Interpersonal Safety     Do you feel physically and emotionally safe where you currently live?: Yes     Within the past 12 months, have you been hit, slapped, kicked or otherwise physically hurt by someone?: No     Within the past 12 months, have you been humiliated or emotionally abused in other ways by your partner or ex-partner?: No   Housing Stability: Low Risk  (1/15/2024)    Housing Stability     Do you have housing? : Yes     Are you worried about losing your housing?: No       Family History:  Family History   Problem Relation Age of Onset    Thyroid Disease Mother     Unknown/Adopted Mother     Hyperlipidemia Father     Hypertension Paternal Grandmother     Hyperlipidemia Paternal Grandmother     Diabetes Paternal Aunt     Hypertension Paternal Aunt     Lipids Paternal Aunt     Thyroid Disease Maternal Half-Brother     Unknown/Adopted No family hx of         unaware of maternal grandparents history as pt mom was adopted    Breast Cancer No family hx of     Cancer - colorectal No family hx of     Gynecology No family hx of         no ovarian cancer    Coronary Artery Disease No family hx of     Cerebrovascular Disease No family hx of     Thyroid Disease No family hx of     Osteoporosis No family hx of        Physical Exam:   Vitals:    09/26/24 0913   BP: 124/83   BP Location: Left arm   Patient Position: Chair   Cuff Size: Adult Regular   Pulse: 85   Temp: 97.1  F (36.2  C)   TempSrc: Tympanic   Weight: 85.7 kg (189 lb)      Gen: lying in bed, NAD  CV: Reg rate, well perfused  Pulm: no increased work of breathing  Abd: non-tender, non-distended, no masses   Pelvis: normal appearing external genitalia, vaginal mucosa, cervix, bimanual exam with normal size and contour of uterus with no adnexal masses  Extremities: non-tender, no erythema; no edema  Psych: normal mood and affect  Neuro: no focal deficits    Labs:  UPT neg    [facility] Endometrial Biopsy  Procedure Note    Tram Bal  1985  [id number]    The patient was counseled on the risks (including including risk of infection, bleeding, recurrence), benefits, and alternatives of the procedure. Verbal and written consent were obtained.  A UPT was negative prior to the procedure.    Technique: The patient was placed in the dorsal lithotomy position.  A speculum was placed in the vagina and the cervix visualized. The cervix was cleaned with betadine swabs x3. The rocket curet was passed through the cervix to the fundus with return of scant tissue. This was placed in specimen jar and sent for permanent pathology. All instruments were removed.  The patient tolerated the procedure well.  She was given post op instructions which included activity and pelvic restrictions.      A/P:  s/p EMB.   Will call to discuss results with patient.     Nicole Coelho MD   OB/GYN   9/26/2024      A&P Tram Bal is a 39 year old P2 who presents for discussion of hysterectomy.  On review patient has a long history of abnormal Pap smears dating back greater than 25 years.  She had JARETH-3 in 2005.  Did discuss with patient need to take continue Pap smears times at least 25 years after high-grade dysplasia and she states understanding.  Did review with patient at length risks of surgery including bleeding, infection, injury to surrounding structures.  The patient feels strongly she would like to proceed.  Discussed total laparoscopic hysterectomy with bilateral salpingectomy reviewed recovery of surgery in detail with patient she continues to feel strongly that she prefers definitive management for her history of longstanding abnormal Pap smears.  After discussion did complete an endometrial biopsy given her desire for surgery.  Orders placed for TLH, BS, cystoscopy.  Patient will schedule a preop physical.  All questions answered and patient in agreement with plan of care.    I spent 30 minutes reviewing chart,  obtaining history, counseling, examining, coordinating care, documenting, excluding the above procedure.  Nicole Coelho MD   9/26/2024 12:40 PM

## 2024-09-26 NOTE — NURSING NOTE
"Initial /83 (BP Location: Left arm, Patient Position: Chair, Cuff Size: Adult Regular)   Pulse 85   Temp 97.1  F (36.2  C) (Tympanic)   Wt 85.7 kg (189 lb)   LMP 08/06/2024 (Exact Date)   BMI 32.44 kg/m   Estimated body mass index is 32.44 kg/m  as calculated from the following:    Height as of 9/19/24: 1.626 m (5' 4\").    Weight as of this encounter: 85.7 kg (189 lb). .      Ramya Estrada MA    "

## 2024-10-03 ENCOUNTER — ALLIED HEALTH/NURSE VISIT (OUTPATIENT)
Dept: FAMILY MEDICINE | Facility: CLINIC | Age: 39
End: 2024-10-03
Payer: COMMERCIAL

## 2024-10-03 DIAGNOSIS — E55.9 VITAMIN D DEFICIENCY: Primary | ICD-10-CM

## 2024-10-03 PROCEDURE — 99207 PR NO CHARGE NURSE ONLY: CPT

## 2024-10-03 PROCEDURE — 96372 THER/PROPH/DIAG INJ SC/IM: CPT | Performed by: NURSE PRACTITIONER

## 2024-10-03 RX ADMIN — CYANOCOBALAMIN 1000 MCG: 1000 INJECTION, SOLUTION INTRAMUSCULAR; SUBCUTANEOUS at 11:30

## 2024-10-10 ENCOUNTER — ALLIED HEALTH/NURSE VISIT (OUTPATIENT)
Dept: FAMILY MEDICINE | Facility: CLINIC | Age: 39
End: 2024-10-10
Payer: COMMERCIAL

## 2024-10-10 DIAGNOSIS — E61.1 IRON DEFICIENCY: Primary | ICD-10-CM

## 2024-10-10 PROCEDURE — 96372 THER/PROPH/DIAG INJ SC/IM: CPT | Performed by: NURSE PRACTITIONER

## 2024-10-10 PROCEDURE — 99207 PR NO CHARGE NURSE ONLY: CPT

## 2024-10-10 RX ADMIN — CYANOCOBALAMIN 1000 MCG: 1000 INJECTION, SOLUTION INTRAMUSCULAR; SUBCUTANEOUS at 10:42

## 2024-10-10 NOTE — PROGRESS NOTES
Clinic Administered Medication Documentation      Injectable Medication Documentation    Is there an active order (written within the past 365 days, with administrations remaining, not ) in the chart? Yes.     Patient was given Cyanocobalamin (B-12). Prior to medication administration, verified patient's identity using patient s name and date of birth. Please see MAR and medication order for additional information. Patient instructed to remain in clinic for 15 minutes and report any adverse reaction to staff immediately.    Vial/Syringe: Single dose vial. Was entire vial of medication used? Yes  Was this medication supplied by the patient? No  Is this a medication the patient will need to receive again? Yes. Verified that the patient has refills remaining in their prescription.    Given left deltoid.     Bailee Kahler on 10/10/2024 at 10:43 AM

## 2024-10-12 ASSESSMENT — PATIENT HEALTH QUESTIONNAIRE - PHQ9: SUM OF ALL RESPONSES TO PHQ QUESTIONS 1-9: 9

## 2024-11-04 ENCOUNTER — OFFICE VISIT (OUTPATIENT)
Dept: FAMILY MEDICINE | Facility: CLINIC | Age: 39
End: 2024-11-04
Payer: COMMERCIAL

## 2024-11-04 VITALS
BODY MASS INDEX: 33.8 KG/M2 | OXYGEN SATURATION: 97 % | WEIGHT: 198 LBS | SYSTOLIC BLOOD PRESSURE: 126 MMHG | HEART RATE: 86 BPM | RESPIRATION RATE: 16 BRPM | TEMPERATURE: 99 F | HEIGHT: 64 IN | DIASTOLIC BLOOD PRESSURE: 80 MMHG

## 2024-11-04 DIAGNOSIS — F41.1 GENERALIZED ANXIETY DISORDER: ICD-10-CM

## 2024-11-04 DIAGNOSIS — R87.810 CERVICAL HIGH RISK HPV (HUMAN PAPILLOMAVIRUS) TEST POSITIVE: ICD-10-CM

## 2024-11-04 DIAGNOSIS — D06.9 CIN III (CERVICAL INTRAEPITHELIAL NEOPLASIA III): ICD-10-CM

## 2024-11-04 DIAGNOSIS — Z01.818 PREOP GENERAL PHYSICAL EXAM: Primary | ICD-10-CM

## 2024-11-04 DIAGNOSIS — Z98.84 BARIATRIC SURGERY STATUS: ICD-10-CM

## 2024-11-04 PROCEDURE — 99214 OFFICE O/P EST MOD 30 MIN: CPT | Performed by: NURSE PRACTITIONER

## 2024-11-04 RX ORDER — HYDROXYZINE HYDROCHLORIDE 25 MG/1
12.5-25 TABLET, FILM COATED ORAL 3 TIMES DAILY PRN
Qty: 30 TABLET | Refills: 1 | Status: SHIPPED | OUTPATIENT
Start: 2024-11-04

## 2024-11-04 ASSESSMENT — ANXIETY QUESTIONNAIRES
3. WORRYING TOO MUCH ABOUT DIFFERENT THINGS: SEVERAL DAYS
2. NOT BEING ABLE TO STOP OR CONTROL WORRYING: SEVERAL DAYS
1. FEELING NERVOUS, ANXIOUS, OR ON EDGE: SEVERAL DAYS
GAD7 TOTAL SCORE: 7
IF YOU CHECKED OFF ANY PROBLEMS ON THIS QUESTIONNAIRE, HOW DIFFICULT HAVE THESE PROBLEMS MADE IT FOR YOU TO DO YOUR WORK, TAKE CARE OF THINGS AT HOME, OR GET ALONG WITH OTHER PEOPLE: SOMEWHAT DIFFICULT
GAD7 TOTAL SCORE: 7
7. FEELING AFRAID AS IF SOMETHING AWFUL MIGHT HAPPEN: SEVERAL DAYS
6. BECOMING EASILY ANNOYED OR IRRITABLE: NOT AT ALL
5. BEING SO RESTLESS THAT IT IS HARD TO SIT STILL: SEVERAL DAYS

## 2024-11-04 ASSESSMENT — PATIENT HEALTH QUESTIONNAIRE - PHQ9
5. POOR APPETITE OR OVEREATING: MORE THAN HALF THE DAYS
SUM OF ALL RESPONSES TO PHQ QUESTIONS 1-9: 3

## 2024-11-04 ASSESSMENT — PAIN SCALES - GENERAL: PAINLEVEL_OUTOF10: NO PAIN (0)

## 2024-11-04 NOTE — PROGRESS NOTES
"  {PROVIDER CHARTING PREFERENCE:645710}    Gael Ugalde is a 39 year old, presenting for the following health issues:  Pre-Op Exam (Discuss medications prior to surgery. Discuss B12 injection. ) and Anxiety (Hydroxyzine recheck, would like filled today. )        2024    11:40 AM   Additional Questions   Roomed by Michelle PORTILLO   Accompanied by self         2024    11:40 AM   Patient Reported Additional Medications   Patient reports taking the following new medications no new meds     HPI     Anxiety   How are you doing with your anxiety since your last visit? No change  Are you having other symptoms that might be associated with anxiety? No  Have you had a significant life event? No   Are you feeling depressed? No  Do you have any concerns with your use of alcohol or other drugs? No    Social History     Tobacco Use    Smoking status: Former     Current packs/day: 0.00     Types: Cigarettes     Quit date: 2007     Years since quittin.5    Smokeless tobacco: Never    Tobacco comments:     quit smoking may 2010    Vaping Use    Vaping status: Never Used   Substance Use Topics    Alcohol use: Yes     Comment: occasionally    Drug use: No         10/10/2022    11:31 AM 2022     5:02 PM 2024     2:31 PM   CLARA-7 SCORE   Total Score 5 20 5         1/15/2024     3:43 PM 2024     8:48 AM 2024    11:05 PM   PHQ   PHQ-9 Total Score 11 3 4   Q9: Thoughts of better off dead/self-harm past 2 weeks Not at all  Not at all  Not at all        Patient-reported     {Last PHQ9 or GAD7 Responses (Optional):646991}    {additonal problems for provider to add (Optional):075358}    {ROS Picklists (Optional):548898}      Objective    /80   Pulse 86   Temp 99  F (37.2  C) (Tympanic)   Resp 16   Ht 1.626 m (5' 4\")   Wt 89.8 kg (198 lb)   LMP 10/27/2024 (Exact Date)   SpO2 97%   BMI 33.99 kg/m    Body mass index is 33.99 kg/m .  Physical Exam   {Exam List (Optional):813860}    {Diagnostic " Test Results (Optional):775625}        Signed Electronically by: DEON Torres CNP  {Email feedback regarding this note to [email or web address]   :[id number]}

## 2024-11-04 NOTE — PROGRESS NOTES
Preoperative Evaluation  [facility]  [address] [place] [id number]  Phone: [phone]  Fax: [phone]  Primary Provider: DEON Torres CNP  Pre-op Performing Provider: DEON Torres CNP  Nov 4, 2024         11/3/2024   Surgical Information   What procedure is being done? Hysterectomy    Facility or Hospital where procedure/surgery will be performed: [facility]    Who is doing the procedure / surgery? Dr Coelho    Date of surgery / procedure: Nov 13th    Time of surgery / procedure: 9am    Where do you plan to recover after surgery? at home with family        Patient-reported     Fax number for surgical facility: Note does not need to be faxed, will be available electronically in Epic.    Assessment & Plan     The proposed surgical procedure is considered INTERMEDIATE risk.    Preop general physical exam    Cervical high risk HPV (human papillomavirus) test positive    JARETH III (cervical intraepithelial neoplasia III)    Generalized anxiety disorder    - hydrOXYzine HCl (ATARAX) 25 MG tablet; Take 0.5-1 tablets (12.5-25 mg) by mouth 3 times daily as needed for anxiety.    Bariatric surgery status          - No identified additional risk factors other than previously addressed    Antiplatelet or Anticoagulation Medication Instructions   - Patient is on no antiplatelet or anticoagulation medications.    Additional Medication Instructions  Take all scheduled medications on the day of surgery EXCEPT for modifications listed below:   - Beta Blockers: Continue taking on the day of surgery.   - Herbal medications and vitamins: DO NOT TAKE 14 days prior to surgery.   - SSRIs, SNRIs, TCAs, Antipsychotics: Continue without modification.     Recommendation  Approval given to proceed with proposed procedure, without further diagnostic evaluation.    Gael Ugalde is a 39 year old, presenting for the following:  Pre-Op Exam (Discuss medications prior to  surgery. Discuss B12 injection. ) and Anxiety (Hydroxyzine recheck, would like filled today. )          11/4/2024    11:40 AM   Additional Questions   Roomed by Michelle PORTILLO   Accompanied by self         11/4/2024    11:40 AM   Patient Reported Additional Medications   Patient reports taking the following new medications no new meds     HPI related to upcoming procedure: History of JARETH III and high risk HPV with planned hysterectomy         11/3/2024   Pre-Op Questionnaire   Have you ever had a heart attack or stroke? No    Have you ever had surgery on your heart or blood vessels, such as a stent placement, a coronary artery bypass, or surgery on an artery in your head, neck, heart, or legs? No    Do you have chest pain with activity? No    Do you have a history of heart failure? No    Do you currently have a cold, bronchitis or symptoms of other infection? No    Do you have a cough, shortness of breath, or wheezing? No    Do you or anyone in your family have previous history of blood clots? No    Do you or does anyone in your family have a serious bleeding problem such as prolonged bleeding following surgeries or cuts? No    Have you ever had problems with anemia or been told to take iron pills? No    Have you had any abnormal blood loss such as black, tarry or bloody stools, or abnormal vaginal bleeding? No    Have you ever had a blood transfusion? No    Are you willing to have a blood transfusion if it is medically needed before, during, or after your surgery? Yes    Have you or any of your relatives ever had problems with anesthesia? No    Do you have sleep apnea, excessive snoring or daytime drowsiness? No    Do you have any artifical heart valves or other implanted medical devices like a pacemaker, defibrillator, or continuous glucose monitor? No    Do you have artificial joints? No    Are you allergic to latex? No        Patient-reported     Health Care Directive  Patient does not have a Health Care Directive:  Discussed advance care planning with patient; however, patient declined at this time.- has information at home    Preoperative Review of    reviewed - controlled substances prescribed by other outside provider(s).    Status of Chronic Conditions:  See problem list for active medical problems.  Problems all longstanding and stable, except as noted/documented.  See ROS for pertinent symptoms related to these conditions.    Patient Active Problem List    Diagnosis Date Noted    Postsurgical malabsorption 07/06/2023     Priority: Medium    Iron deficiency 01/16/2020     Priority: Medium    Vitamin D deficiency 10/28/2018     Priority: Medium    Mild major depression (H)      Priority: Medium    Overweight with body mass index (BMI) of 29 to 29.9 in adult 08/07/2018     Priority: Medium    Bariatric surgery status 01/10/2017     Priority: Medium    Generalized anxiety disorder 03/29/2016     Priority: Medium    Tobacco abuse, in remission 07/13/2011     Priority: Medium    LAP-BAND surgery status 05/16/2011     Priority: Medium    GERD without esophagitis 04/19/2010     Priority: Medium    Displacement of lumbar intervertebral disc 05/15/2008     Priority: Medium    JARETH III (cervical intraepithelial neoplasia III) 08/22/2005     Priority: Medium     7/04 LSIL   10/04 [place] JARETH I  4/05 LSIL  6/05 [place] JARETH II & III  8/05 LEEP JARETH III  12/05 NIL pap  4/06 ASCUS, neg HPV  10/06, 11/07, 12/08 NIL paps  1/10 ASCUS, neg HPV  2011, 2012, 2013 NIL paps  3/31/14 NIL/neg HPV. Plan cotest in 1 year.  4/20/15 NIL/neg HPV. Plan: cotest in 3 years.  4/25/16 ASCUS, Neg HPV. Cotest in 1 year.   4/28/17 ASCUS, +High Risk HPV (Neg 16/18).  Plan [place]  5/22/17 [place] Benign. ASCUS pap, +HR HPV (Not types 16/18). Plan cotest in 1 year.   10/26/18 NIL Pap, + HR HPV (Neg 16/18). Plan colp  4/1/19 [place] ECC - atypia, favor reactive change.  NIL pap, + HR HPV (not 16 or 18). Plan: cotest in 1 yr   11/23/20 NIL Pap, + HR HPV (neg 16/18). [place]  due by 2/23/2021 6/29/21 [place] ECC - no JARETH. Plan: cotest in 1 year  8/8/22 Lost to follow-up for pap tracking   10/10/22 NIL pap, + HR HPV (not 16 or 18) colp by 1/10/23  1/6/23 [place] ECC neg. NIL pap, +HR HPV (not 16/18). Plan: cotest in 1 year   1/16/24 NIL pap, +HR HPV 18 & other. Plan: colposcopy due before 4/16/24 1/22/24 left message / mychart sent / mychart read  3/14/24 Reminder mychart  4/16/24 [place] not done. Tracking updated for 6 mo colp/pap due 7/16/24.    6/26/24 Reminder mychart -- read  7/29/24 Lost to follow-up for pap tracking         Past Medical History:   Diagnosis Date    Abnormal Pap smear of cervix 2004 2005, 2006, 2010, 2016, 2017    Cervical high risk HPV (human papillomavirus) test positive 2017 2018, 2020    Depressive disorder 2016    Displacement of lumbar intervertebral disc 05/15/2008    Esophageal reflux     Hx of colposcopy with cervical biopsy 05/22/2017 5/22/17:[place]- Benign.     Thyroid disease 2022     Past Surgical History:   Procedure Laterality Date    ABDOMEN SURGERY  3/2023    Tummy tuck    COLPOSCOPY,LOOP ELECTRD CERVIX EXCIS  08/11/2005    JARETH III    GI SURGERY  04/14/2011    LAP BANDING for obesity, pre-DM    LAPAROSCOPIC REMOVAL GASTRIC ADJUSTABLE BAND N/A 5/17/2024    Procedure: Robotic assisted laparoscopic removal of adjustable gastric band;  Surgeon: Darrel Zaragoza MD;  Location:  OR    [facility] NONSPECIFIC PROCEDURE  1999    Oral surgery     Current Outpatient Medications   Medication Sig Dispense Refill    acyclovir (ZOVIRAX) 800 MG tablet Take 1 tablet (800 mg) by mouth 3 times daily For 2 days, may repeat as necessary for outbreaks 21 tablet 2    clotrimazole (LOTRIMIN) 1 % external cream Apply topically 2 times daily 60 g 1    etonogestrel-ethinyl estradiol (ELURYNG) 0.12-0.015 MG/24HR vaginal ring INSERT 1 RING VAGINALLY FOR 21 DAYS, THEN REMOVE FOR 1 WEEK, THEN REPEAT WITH NEW RING 3 each 3    FLUoxetine (PROZAC) 40 MG capsule Take 1 capsule  "(40 mg) by mouth daily 90 capsule 3    hydrOXYzine HCl (ATARAX) 25 MG tablet Take 0.5-1 tablets (12.5-25 mg) by mouth 3 times daily as needed for anxiety. 30 tablet 1    loratadine (CLARITIN) 10 MG tablet Take 10 mg by mouth as needed for allergies      Multiple Vitamins-Iron TABS Take by mouth daily       omeprazole (PRILOSEC) 40 MG DR capsule TAKE 1 CAPSULE(40 MG) BY MOUTH DAILY 90 capsule 0    propranolol (INDERAL) 10 MG tablet TAKE 1 TABLET(10 MG) BY MOUTH TWICE DAILY 180 tablet 2       Allergies   Allergen Reactions    Famvir [Famciclovir] Hives        Social History     Tobacco Use    Smoking status: Former     Current packs/day: 0.00     Types: Cigarettes     Quit date: 2007     Years since quittin.5    Smokeless tobacco: Never    Tobacco comments:     quit smoking may 2010    Substance Use Topics    Alcohol use: Yes     Comment: occasionally     History   Drug Use No           Review of Systems  CONSTITUTIONAL: NEGATIVE for fever, chills, change in weight  INTEGUMENTARY/SKIN: NEGATIVE for worrisome rashes, moles or lesions  EYES: NEGATIVE for vision changes or irritation  ENT/MOUTH: NEGATIVE for ear, mouth and throat problems  RESP: NEGATIVE for significant cough or SOB  CV: NEGATIVE for chest pain, palpitations or peripheral edema  GI: NEGATIVE for nausea, abdominal pain, heartburn, or change in bowel habits  : NEGATIVE for frequency, dysuria, or hematuria  MUSCULOSKELETAL: NEGATIVE for significant arthralgias or myalgia  NEURO: NEGATIVE for weakness, dizziness or paresthesias  ENDOCRINE: NEGATIVE for temperature intolerance, skin/hair changes  HEME: NEGATIVE for bleeding problems  PSYCHIATRIC: NEGATIVE for changes in mood or affect    Objective    /80   Pulse 86   Temp 99  F (37.2  C) (Tympanic)   Resp 16   Ht 1.626 m (5' 4\")   Wt 89.8 kg (198 lb)   LMP 10/27/2024 (Exact Date)   SpO2 97%   BMI 33.99 kg/m     Estimated body mass index is 33.99 kg/m  as calculated from the " "following:    Height as of this encounter: 1.626 m (5' 4\").    Weight as of this encounter: 89.8 kg (198 lb).  Physical Exam  GENERAL: alert and no distress  EYES: Eyes grossly normal to inspection, PERRL and conjunctivae and sclerae normal  HENT: ear canals and TM's normal, nose and mouth without ulcers or lesions  NECK: no adenopathy, no asymmetry, masses  RESP: lungs clear to auscultation - no rales, rhonchi or wheezes  CV: regular rate and rhythm, normal S1 S2, no S3 or S4, no murmur, click or rub, no peripheral edema  ABDOMEN: soft, nontender, no hepatosplenomegaly, no masses and bowel sounds normal  MS: no gross musculoskeletal defects noted, no edema  SKIN: no suspicious lesions or rashes  NEURO: Normal strength and tone, mentation intact and speech normal  PSYCH: mentation appears normal, affect normal/bright    Recent Labs   Lab Test 09/10/24  1111 05/07/24  1245 12/21/23  1416   HGB 12.4 12.1  --    * 498*  --    NA  --   --  137   POTASSIUM  --   --  4.0   CR  --   --  0.64   A1C  --  5.4 5.7*        Diagnostics  No labs were ordered during this visit.   No EKG required, no history of coronary heart disease, significant arrhythmia, peripheral arterial disease or other structural heart disease.    Revised Cardiac Risk Index (RCRI)  The patient has the following serious cardiovascular risks for perioperative complications:   - No serious cardiac risks = 0 points     RCRI Interpretation: 0 points: Class I (very low risk - 0.4% complication rate)         Signed Electronically by: DEON Torres CNP  A copy of this evaluation report is provided to the requesting physician.       "

## 2024-11-04 NOTE — PATIENT INSTRUCTIONS
How to Take Your Medication Before Surgery  Antiplatelet or Anticoagulation Medication Instructions   - Patient is on no antiplatelet or anticoagulation medications.    Additional Medication Instructions  Take all scheduled medications on the day of surgery EXCEPT for modifications listed below:   - Beta Blockers: Propanolol- Continue taking on the day of surgery.   - Herbal medications and vitamins: DO NOT TAKE 14 days prior to surgery.   - SSRIs, SNRIs, TCAs, Antipsychotics: Fluoxetine- Continue without modification.        Patient Education   Preparing for Your Surgery  For Adults  Getting started  In most cases, a nurse will call to review your health history and instructions. They will give you an arrival time based on your scheduled surgery time. Please be ready to share:  Your doctor's clinic name and phone number  Your medical, surgical, and anesthesia history  A list of allergies and sensitivities  A list of medicines, including herbal treatments and over-the-counter drugs  Whether the patient has a legal guardian (ask how to send us the papers in advance)  Note: You may not receive a call if you were seen at our PAC (Preoperative Assessment Center).  Please tell us if you're pregnant--or if there's any chance you might be pregnant. Some surgeries may injure a fetus (unborn baby), so they require a pregnancy test. Surgeries that are safe for a fetus don't always need a test, and you can choose whether to have one.   Preparing for surgery  Within 10 to 30 days of surgery: Have a pre-op exam (sometimes called an H&P, or History and Physical). This can be done at a clinic or pre-operative center.  If you're having a , you may not need this exam. Talk to your care team.  At your pre-op exam, talk to your care team about all medicines you take. (This includes CBD oil and any drugs, such as THC, marijuana, and other forms of cannabis.) If you need to stop any medicine before surgery, ask when to start  taking it again.  This is for your safety. Many medicines and drugs can make you bleed too much during surgery. Some change how well surgery (anesthesia) drugs work.  Call your insurance company to let them know you're having surgery. (If you don't have insurance, call [phone].)  Call your clinic if there's any change in your health. This includes a scrape or scratch near the surgery site, or any signs of a cold (sore throat, runny nose, cough, rash, fever).  Eating and drinking guidelines  For your safety: Unless your surgeon tells you otherwise, follow the guidelines below.  Eat and drink as normal until 8 hours before you arrive for surgery. After that, no food or milk. You can spit out gum when you arrive.  Drink clear liquids until 2 hours before you arrive. These are liquids you can see through, like water, Gatorade, and Propel Water. They also include plain black coffee and tea (no cream or milk).  No alcohol for 24 hours before you arrive. The night before surgery, stop any drinks that contain THC.  If your care team tells you to take medicine on the morning of surgery, it's okay to take it with a sip of water. No other medicines or drugs are allowed (including CBD oil)--follow your care team's instructions.  If you have questions the day of surgery, call your hospital or surgery center.   Preventing infection  Shower or bathe the night before and the morning of surgery. Follow the instructions your clinic gave you. (If no instructions, use regular soap.)  Don't shave or clip hair near your surgery site. We'll remove the hair if needed.  Don't smoke or vape the morning of surgery. No chewing tobacco for 6 hours before you arrive. A nicotine patch is okay. You may spit out nicotine gum when you arrive.  For some surgeries, the surgeon will tell you to fully quit smoking and nicotine.  We will make every effort to keep you safe from infection. We will:  Clean our hands often with soap and water (or an  alcohol-based hand rub).  Clean the skin at your surgery site with a special soap that kills germs.  Give you a special gown to keep you warm. (Cold raises the risk of infection.)  Wear hair covers, masks, gowns, and gloves during surgery.  Give antibiotic medicine, if prescribed. Not all surgeries need this medicine.  What to bring on the day of surgery  Photo ID and insurance card  Copy of your health care directive, if you have one  Glasses and hearing aids (bring cases)  You can't wear contacts during surgery  Inhaler and eye drops, if you use them (tell us about these when you arrive)  CPAP machine or breathing device, if you use them  A few personal items, if spending the night  If you have . . .  A pacemaker, ICD (cardiac defibrillator), or other implant: Bring the ID card.  An implanted stimulator: Bring the remote control.  A legal guardian: Bring a copy of the certified (court-stamped) guardianship papers.  Please remove any jewelry, including body piercings. Leave jewelry and other valuables at home.  If you're going home the day of surgery  You must have a responsible adult drive you home. They should stay with you overnight as well.  If you don't have someone to stay with you, and you aren't safe to go home alone, we may keep you overnight. Insurance often won't pay for this.  After surgery  If it's hard to control your pain or you need more pain medicine, please call your surgeon's office.  Questions?   If you have any questions for your care team, list them here:   ____________________________________________________________________________________________________________________________________________________________________________________________________________________________________________________________  For informational purposes only. Not to replace the advice of your health care provider. Copyright   2003, 2019 [facility]. All rights reserved. Clinically reviewed by Sudeep  MD [employer]. [employer] [id number] - REV 08/24.

## 2024-11-12 ENCOUNTER — ANESTHESIA EVENT (OUTPATIENT)
Dept: SURGERY | Facility: CLINIC | Age: 39
End: 2024-11-12
Payer: COMMERCIAL

## 2024-11-12 ASSESSMENT — LIFESTYLE VARIABLES: TOBACCO_USE: 1

## 2024-11-12 NOTE — ANESTHESIA PREPROCEDURE EVALUATION
Anesthesia Pre-Procedure Evaluation    Patient: Tram Bal   MRN: [id number] : 1985        Procedure : Procedure(s):  HYSTERECTOMY, TOTAL, LAPAROSCOPIC, bilateral salpingectomy, cystoscopy          Past Medical History:   Diagnosis Date    Abnormal Pap smear of cervix 2004    , 2006, , ,     Cervical high risk HPV (human papillomavirus) test positive 2017    Depressive disorder 2016    Displacement of lumbar intervertebral disc 05/15/2008    Esophageal reflux     Hx of colposcopy with cervical biopsy 2017:[place]- Benign.     Thyroid disease       Past Surgical History:   Procedure Laterality Date    ABDOMEN SURGERY  3/2023    Tummy tuck    COLPOSCOPY,LOOP ELECTRD CERVIX EXCIS  2005    JARETH III    GI SURGERY  2011    LAP BANDING for obesity, pre-DM    LAPAROSCOPIC REMOVAL GASTRIC ADJUSTABLE BAND N/A 2024    Procedure: Robotic assisted laparoscopic removal of adjustable gastric band;  Surgeon: Darrel Zaragoza MD;  Location:  OR    [facility] NONSPECIFIC PROCEDURE      Oral surgery      Allergies   Allergen Reactions    Famvir [Famciclovir] Hives      Social History     Tobacco Use    Smoking status: Former     Current packs/day: 0.00     Types: Cigarettes     Quit date: 2007     Years since quittin.5    Smokeless tobacco: Never    Tobacco comments:     quit smoking may 2010    Substance Use Topics    Alcohol use: Yes     Comment: occasionally      Wt Readings from Last 1 Encounters:   24 89.8 kg (198 lb)        Anesthesia Evaluation   Pt has had prior anesthetic. Type: General and MAC.        ROS/MED HX  ENT/Pulmonary:     (+)                tobacco use, Past use,                       Neurologic:  - neg neurologic ROS     Cardiovascular:       METS/Exercise Tolerance: >4 METS    Hematologic:  - neg hematologic  ROS     Musculoskeletal:  - neg musculoskeletal ROS     GI/Hepatic: Comment: S/P gastric band      (+) GERD,                    Renal/Genitourinary:       Endo:     (+)          thyroid problem,     Obesity,       Psychiatric/Substance Use:     (+) psychiatric history depression       Infectious Disease:  - neg infectious disease ROS     Malignancy:  - neg malignancy ROS     Other:  - neg other ROS          Physical Exam    Airway  airway exam normal      Mallampati: II   TM distance: > 3 FB   Neck ROM: full   Mouth opening: > 3 cm    Respiratory Devices and Support         Dental  no notable dental history     (+) Minor Abnormalities - some fillings, tiny chips      Cardiovascular   cardiovascular exam normal          Pulmonary   pulmonary exam normal                OUTSIDE LABS:  CBC:   Lab Results   Component Value Date    WBC 7.4 09/10/2024    WBC 7.0 05/07/2024    HGB 12.4 09/10/2024    HGB 12.1 05/07/2024    HCT 38.3 09/10/2024    HCT 37.2 05/07/2024     (H) 09/10/2024     (H) 05/07/2024     BMP:   Lab Results   Component Value Date     12/21/2023     11/06/2022    POTASSIUM 4.0 12/21/2023    POTASSIUM 3.8 11/06/2022    CHLORIDE 103 12/21/2023    CHLORIDE 108 11/06/2022    CO2 20 (L) 12/21/2023    CO2 22 11/06/2022    BUN 10.1 12/21/2023    BUN 16 11/06/2022    CR 0.64 12/21/2023    CR 0.61 11/06/2022     (H) 12/21/2023    GLC 92 11/06/2022     COAGS:   Lab Results   Component Value Date    INR 0.95 08/09/2010     POC:   Lab Results   Component Value Date    HCG Negative 09/26/2024     HEPATIC:   Lab Results   Component Value Date    ALBUMIN 4.2 12/21/2023    PROTTOTAL 7.4 12/21/2023    ALT 25 12/21/2023    AST 21 12/21/2023    ALKPHOS 88 12/21/2023    BILITOTAL 0.3 12/21/2023     OTHER:   Lab Results   Component Value Date    A1C 5.4 05/07/2024    HANK 9.1 12/21/2023    MAG 1.9 04/20/2022    TSH 1.00 05/07/2024    T4 0.93 05/07/2024    T3 134 05/07/2024       Anesthesia Plan    ASA Status:  2    NPO Status:  NPO Appropriate    Anesthesia Type: General.   Induction: Propofol,  "Intravenous.   Maintenance: TIVA.        Consents    Anesthesia Plan(s) and associated risks, benefits, and realistic alternatives discussed. Questions answered and patient/representative(s) expressed understanding.     - Discussed: Risks, Benefits and Alternatives for BOTH SEDATION and the PROCEDURE were discussed     - Discussed with:  Patient            Postoperative Care    Pain management: Multi-modal analgesia, IV analgesics, Oral pain medications.   PONV prophylaxis: Ondansetron (or other 5HT-3), Dexamethasone or Solumedrol, Background Propofol Infusion     Comments:               DEON Sky CRNA    I have reviewed the pertinent notes and labs in the chart from the past 30 days and (re)examined the patient.  Any updates or changes from those notes are reflected in this note.                         # Obesity: Estimated body mass index is 33.99 kg/m  as calculated from the following:    Height as of 11/4/24: 1.626 m (5' 4\").    Weight as of 11/4/24: 89.8 kg (198 lb).             "

## 2024-11-13 ENCOUNTER — ANESTHESIA (OUTPATIENT)
Dept: SURGERY | Facility: CLINIC | Age: 39
End: 2024-11-13
Payer: COMMERCIAL

## 2024-11-13 ENCOUNTER — HOSPITAL ENCOUNTER (OUTPATIENT)
Facility: CLINIC | Age: 39
Discharge: HOME | End: 2024-11-13
Attending: OBSTETRICS & GYNECOLOGY | Admitting: OBSTETRICS & GYNECOLOGY
Payer: COMMERCIAL

## 2024-11-13 VITALS
WEIGHT: 198 LBS | BODY MASS INDEX: 33.8 KG/M2 | OXYGEN SATURATION: 93 % | RESPIRATION RATE: 16 BRPM | HEIGHT: 64 IN | TEMPERATURE: 98 F | HEART RATE: 79 BPM | SYSTOLIC BLOOD PRESSURE: 103 MMHG | DIASTOLIC BLOOD PRESSURE: 74 MMHG

## 2024-11-13 DIAGNOSIS — Z90.710 S/P HYSTERECTOMY: Primary | ICD-10-CM

## 2024-11-13 LAB
ABO/RH(D): NORMAL
ANTIBODY SCREEN: NEGATIVE
BASOPHILS # BLD AUTO: 0.1 10E3/UL (ref 0–0.2)
BASOPHILS NFR BLD AUTO: 1 %
EOSINOPHIL # BLD AUTO: 0.2 10E3/UL (ref 0–0.7)
EOSINOPHIL NFR BLD AUTO: 2 %
ERYTHROCYTE [DISTWIDTH] IN BLOOD BY AUTOMATED COUNT: 14.3 % (ref 10–15)
HCG UR QL: NEGATIVE
HCT VFR BLD AUTO: 37.8 % (ref 35–47)
HGB BLD-MCNC: 12.7 G/DL (ref 11.7–15.7)
IMM GRANULOCYTES # BLD: 0 10E3/UL
IMM GRANULOCYTES NFR BLD: 0 %
LYMPHOCYTES # BLD AUTO: 3.1 10E3/UL (ref 0.8–5.3)
LYMPHOCYTES NFR BLD AUTO: 36 %
MCH RBC QN AUTO: 28.3 PG (ref 26.5–33)
MCHC RBC AUTO-ENTMCNC: 33.6 G/DL (ref 31.5–36.5)
MCV RBC AUTO: 84 FL (ref 78–100)
MONOCYTES # BLD AUTO: 0.7 10E3/UL (ref 0–1.3)
MONOCYTES NFR BLD AUTO: 8 %
NEUTROPHILS # BLD AUTO: 4.6 10E3/UL (ref 1.6–8.3)
NEUTROPHILS NFR BLD AUTO: 52 %
NRBC # BLD AUTO: 0 10E3/UL
NRBC BLD AUTO-RTO: 0 /100
PLATELET # BLD AUTO: 467 10E3/UL (ref 150–450)
RBC # BLD AUTO: 4.49 10E6/UL (ref 3.8–5.2)
SPECIMEN EXPIRATION DATE: NORMAL
WBC # BLD AUTO: 8.8 10E3/UL (ref 4–11)

## 2024-11-13 PROCEDURE — 258N000003 HC RX IP 258 OP 636: Performed by: NURSE ANESTHETIST, CERTIFIED REGISTERED

## 2024-11-13 PROCEDURE — 86850 RBC ANTIBODY SCREEN: CPT | Performed by: OBSTETRICS & GYNECOLOGY

## 2024-11-13 PROCEDURE — 250N000009 HC RX 250: Performed by: NURSE ANESTHETIST, CERTIFIED REGISTERED

## 2024-11-13 PROCEDURE — 250N000011 HC RX IP 250 OP 636: Performed by: OBSTETRICS & GYNECOLOGY

## 2024-11-13 PROCEDURE — 370N000017 HC ANESTHESIA TECHNICAL FEE, PER MIN: Performed by: OBSTETRICS & GYNECOLOGY

## 2024-11-13 PROCEDURE — 710N000009 HC RECOVERY PHASE 1, LEVEL 1, PER MIN: Performed by: OBSTETRICS & GYNECOLOGY

## 2024-11-13 PROCEDURE — 250N000013 HC RX MED GY IP 250 OP 250 PS 637: Performed by: NURSE ANESTHETIST, CERTIFIED REGISTERED

## 2024-11-13 PROCEDURE — 250N000011 HC RX IP 250 OP 636: Performed by: NURSE ANESTHETIST, CERTIFIED REGISTERED

## 2024-11-13 PROCEDURE — 271N000001 HC OR GENERAL SUPPLY NON-STERILE: Performed by: OBSTETRICS & GYNECOLOGY

## 2024-11-13 PROCEDURE — 88307 TISSUE EXAM BY PATHOLOGIST: CPT | Mod: TC | Performed by: OBSTETRICS & GYNECOLOGY

## 2024-11-13 PROCEDURE — 710N000012 HC RECOVERY PHASE 2, PER MINUTE: Performed by: OBSTETRICS & GYNECOLOGY

## 2024-11-13 PROCEDURE — 86900 BLOOD TYPING SEROLOGIC ABO: CPT | Performed by: OBSTETRICS & GYNECOLOGY

## 2024-11-13 PROCEDURE — 272N000001 HC OR GENERAL SUPPLY STERILE: Performed by: OBSTETRICS & GYNECOLOGY

## 2024-11-13 PROCEDURE — 999N000141 HC STATISTIC PRE-PROCEDURE NURSING ASSESSMENT: Performed by: OBSTETRICS & GYNECOLOGY

## 2024-11-13 PROCEDURE — 360N000077 HC SURGERY LEVEL 4, PER MIN: Performed by: OBSTETRICS & GYNECOLOGY

## 2024-11-13 PROCEDURE — 85049 AUTOMATED PLATELET COUNT: CPT | Performed by: OBSTETRICS & GYNECOLOGY

## 2024-11-13 PROCEDURE — 85004 AUTOMATED DIFF WBC COUNT: CPT | Performed by: OBSTETRICS & GYNECOLOGY

## 2024-11-13 PROCEDURE — 250N000025 HC SEVOFLURANE, PER MIN: Performed by: OBSTETRICS & GYNECOLOGY

## 2024-11-13 PROCEDURE — 85014 HEMATOCRIT: CPT | Performed by: OBSTETRICS & GYNECOLOGY

## 2024-11-13 PROCEDURE — 88307 TISSUE EXAM BY PATHOLOGIST: CPT | Mod: 26 | Performed by: PATHOLOGY

## 2024-11-13 PROCEDURE — 81025 URINE PREGNANCY TEST: CPT | Performed by: OBSTETRICS & GYNECOLOGY

## 2024-11-13 PROCEDURE — 250N000013 HC RX MED GY IP 250 OP 250 PS 637: Performed by: OBSTETRICS & GYNECOLOGY

## 2024-11-13 PROCEDURE — 36415 COLL VENOUS BLD VENIPUNCTURE: CPT | Performed by: OBSTETRICS & GYNECOLOGY

## 2024-11-13 RX ORDER — HYDROXYZINE HYDROCHLORIDE 25 MG/1
25 TABLET, FILM COATED ORAL 3 TIMES DAILY PRN
Status: COMPLETED | OUTPATIENT
Start: 2024-11-13 | End: 2024-11-13

## 2024-11-13 RX ORDER — NALOXONE HYDROCHLORIDE 0.4 MG/ML
0.1 INJECTION, SOLUTION INTRAMUSCULAR; INTRAVENOUS; SUBCUTANEOUS
Status: DISCONTINUED | OUTPATIENT
Start: 2024-11-13 | End: 2024-11-13 | Stop reason: HOSPADM

## 2024-11-13 RX ORDER — FENTANYL CITRATE 50 UG/ML
50 INJECTION, SOLUTION INTRAMUSCULAR; INTRAVENOUS EVERY 5 MIN PRN
Status: DISCONTINUED | OUTPATIENT
Start: 2024-11-13 | End: 2024-11-13 | Stop reason: HOSPADM

## 2024-11-13 RX ORDER — METRONIDAZOLE 500 MG/100ML
500 INJECTION, SOLUTION INTRAVENOUS
Status: COMPLETED | OUTPATIENT
Start: 2024-11-13 | End: 2024-11-13

## 2024-11-13 RX ORDER — HYDROMORPHONE HCL IN WATER/PF 6 MG/30 ML
0.4 PATIENT CONTROLLED ANALGESIA SYRINGE INTRAVENOUS EVERY 5 MIN PRN
Status: DISCONTINUED | OUTPATIENT
Start: 2024-11-13 | End: 2024-11-13 | Stop reason: HOSPADM

## 2024-11-13 RX ORDER — LIDOCAINE HYDROCHLORIDE 20 MG/ML
INJECTION, SOLUTION INFILTRATION; PERINEURAL PRN
Status: DISCONTINUED | OUTPATIENT
Start: 2024-11-13 | End: 2024-11-13

## 2024-11-13 RX ORDER — KETAMINE HYDROCHLORIDE 10 MG/ML
INJECTION INTRAMUSCULAR; INTRAVENOUS PRN
Status: DISCONTINUED | OUTPATIENT
Start: 2024-11-13 | End: 2024-11-13

## 2024-11-13 RX ORDER — CEFAZOLIN SODIUM/WATER 2 G/20 ML
2 SYRINGE (ML) INTRAVENOUS SEE ADMIN INSTRUCTIONS
Status: DISCONTINUED | OUTPATIENT
Start: 2024-11-13 | End: 2024-11-13 | Stop reason: HOSPADM

## 2024-11-13 RX ORDER — CEFAZOLIN SODIUM/WATER 2 G/20 ML
2 SYRINGE (ML) INTRAVENOUS
Status: COMPLETED | OUTPATIENT
Start: 2024-11-13 | End: 2024-11-13

## 2024-11-13 RX ORDER — ACETAMINOPHEN 325 MG/1
975 TABLET ORAL ONCE
Status: COMPLETED | OUTPATIENT
Start: 2024-11-13 | End: 2024-11-13

## 2024-11-13 RX ORDER — HYDROMORPHONE HCL IN WATER/PF 6 MG/30 ML
0.2 PATIENT CONTROLLED ANALGESIA SYRINGE INTRAVENOUS EVERY 5 MIN PRN
Status: DISCONTINUED | OUTPATIENT
Start: 2024-11-13 | End: 2024-11-13 | Stop reason: HOSPADM

## 2024-11-13 RX ORDER — GLYCOPYRROLATE 0.2 MG/ML
INJECTION, SOLUTION INTRAMUSCULAR; INTRAVENOUS PRN
Status: DISCONTINUED | OUTPATIENT
Start: 2024-11-13 | End: 2024-11-13

## 2024-11-13 RX ORDER — OXYCODONE HYDROCHLORIDE 5 MG/1
5-10 TABLET ORAL EVERY 4 HOURS PRN
Qty: 12 TABLET | Refills: 0 | Status: SHIPPED | OUTPATIENT
Start: 2024-11-13

## 2024-11-13 RX ORDER — FENTANYL CITRATE 50 UG/ML
25 INJECTION, SOLUTION INTRAMUSCULAR; INTRAVENOUS EVERY 5 MIN PRN
Status: DISCONTINUED | OUTPATIENT
Start: 2024-11-13 | End: 2024-11-13 | Stop reason: HOSPADM

## 2024-11-13 RX ORDER — SODIUM CHLORIDE, SODIUM LACTATE, POTASSIUM CHLORIDE, CALCIUM CHLORIDE 600; 310; 30; 20 MG/100ML; MG/100ML; MG/100ML; MG/100ML
INJECTION, SOLUTION INTRAVENOUS CONTINUOUS
Status: DISCONTINUED | OUTPATIENT
Start: 2024-11-13 | End: 2024-11-13 | Stop reason: HOSPADM

## 2024-11-13 RX ORDER — IBUPROFEN 800 MG/1
800 TABLET, FILM COATED ORAL EVERY 6 HOURS PRN
Qty: 30 TABLET | Refills: 0 | Status: SHIPPED | OUTPATIENT
Start: 2024-11-13

## 2024-11-13 RX ORDER — ONDANSETRON 4 MG/1
4 TABLET, ORALLY DISINTEGRATING ORAL EVERY 30 MIN PRN
Status: DISCONTINUED | OUTPATIENT
Start: 2024-11-13 | End: 2024-11-13 | Stop reason: HOSPADM

## 2024-11-13 RX ORDER — PROPOFOL 10 MG/ML
INJECTION, EMULSION INTRAVENOUS PRN
Status: DISCONTINUED | OUTPATIENT
Start: 2024-11-13 | End: 2024-11-13

## 2024-11-13 RX ORDER — ACETAMINOPHEN 325 MG/1
975 TABLET ORAL ONCE
Status: DISCONTINUED | OUTPATIENT
Start: 2024-11-13 | End: 2024-11-13

## 2024-11-13 RX ORDER — AMOXICILLIN 250 MG
1-2 CAPSULE ORAL 2 TIMES DAILY PRN
Qty: 30 TABLET | Refills: 0 | Status: SHIPPED | OUTPATIENT
Start: 2024-11-13

## 2024-11-13 RX ORDER — ACETAMINOPHEN 325 MG/1
975 TABLET ORAL EVERY 6 HOURS PRN
Qty: 50 TABLET | Refills: 0 | Status: SHIPPED | OUTPATIENT
Start: 2024-11-13

## 2024-11-13 RX ORDER — ACETAMINOPHEN 325 MG/1
975 TABLET ORAL ONCE
Status: DISCONTINUED | OUTPATIENT
Start: 2024-11-13 | End: 2024-11-13 | Stop reason: HOSPADM

## 2024-11-13 RX ORDER — BUPIVACAINE HYDROCHLORIDE 2.5 MG/ML
INJECTION, SOLUTION INFILTRATION; PERINEURAL PRN
Status: DISCONTINUED | OUTPATIENT
Start: 2024-11-13 | End: 2024-11-13 | Stop reason: HOSPADM

## 2024-11-13 RX ORDER — ONDANSETRON 2 MG/ML
INJECTION INTRAMUSCULAR; INTRAVENOUS PRN
Status: DISCONTINUED | OUTPATIENT
Start: 2024-11-13 | End: 2024-11-13

## 2024-11-13 RX ORDER — IBUPROFEN 200 MG
800 TABLET ORAL ONCE
Status: DISCONTINUED | OUTPATIENT
Start: 2024-11-13 | End: 2024-11-13 | Stop reason: HOSPADM

## 2024-11-13 RX ORDER — GABAPENTIN 300 MG/1
300 CAPSULE ORAL
Status: COMPLETED | OUTPATIENT
Start: 2024-11-13 | End: 2024-11-13

## 2024-11-13 RX ORDER — PROPOFOL 10 MG/ML
INJECTION, EMULSION INTRAVENOUS CONTINUOUS PRN
Status: DISCONTINUED | OUTPATIENT
Start: 2024-11-13 | End: 2024-11-13

## 2024-11-13 RX ORDER — OXYCODONE HYDROCHLORIDE 5 MG/1
5 TABLET ORAL
Status: DISCONTINUED | OUTPATIENT
Start: 2024-11-13 | End: 2024-11-13 | Stop reason: HOSPADM

## 2024-11-13 RX ORDER — LIDOCAINE 40 MG/G
CREAM TOPICAL
Status: DISCONTINUED | OUTPATIENT
Start: 2024-11-13 | End: 2024-11-13 | Stop reason: HOSPADM

## 2024-11-13 RX ORDER — ONDANSETRON 2 MG/ML
4 INJECTION INTRAMUSCULAR; INTRAVENOUS EVERY 30 MIN PRN
Status: DISCONTINUED | OUTPATIENT
Start: 2024-11-13 | End: 2024-11-13 | Stop reason: HOSPADM

## 2024-11-13 RX ORDER — DEXAMETHASONE SODIUM PHOSPHATE 4 MG/ML
4 INJECTION, SOLUTION INTRA-ARTICULAR; INTRALESIONAL; INTRAMUSCULAR; INTRAVENOUS; SOFT TISSUE
Status: DISCONTINUED | OUTPATIENT
Start: 2024-11-13 | End: 2024-11-13 | Stop reason: HOSPADM

## 2024-11-13 RX ORDER — KETOROLAC TROMETHAMINE 30 MG/ML
INJECTION, SOLUTION INTRAMUSCULAR; INTRAVENOUS PRN
Status: DISCONTINUED | OUTPATIENT
Start: 2024-11-13 | End: 2024-11-13

## 2024-11-13 RX ORDER — FENTANYL CITRATE 50 UG/ML
INJECTION, SOLUTION INTRAMUSCULAR; INTRAVENOUS PRN
Status: DISCONTINUED | OUTPATIENT
Start: 2024-11-13 | End: 2024-11-13

## 2024-11-13 RX ADMIN — GABAPENTIN 300 MG: 300 CAPSULE ORAL at 09:39

## 2024-11-13 RX ADMIN — LIDOCAINE HYDROCHLORIDE 100 MG: 20 INJECTION, SOLUTION INFILTRATION; PERINEURAL at 10:40

## 2024-11-13 RX ADMIN — GLYCOPYRROLATE 0.1 MG: 0.2 INJECTION, SOLUTION INTRAMUSCULAR; INTRAVENOUS at 10:54

## 2024-11-13 RX ADMIN — MIDAZOLAM 2 MG: 1 INJECTION INTRAMUSCULAR; INTRAVENOUS at 10:33

## 2024-11-13 RX ADMIN — PROPOFOL 25 MCG/KG/MIN: 10 INJECTION, EMULSION INTRAVENOUS at 10:40

## 2024-11-13 RX ADMIN — PHENYLEPHRINE HYDROCHLORIDE 0.4 MCG/KG/MIN: 10 INJECTION INTRAVENOUS at 11:20

## 2024-11-13 RX ADMIN — HYDROMORPHONE HYDROCHLORIDE 1 MG: 1 INJECTION, SOLUTION INTRAMUSCULAR; INTRAVENOUS; SUBCUTANEOUS at 11:04

## 2024-11-13 RX ADMIN — ACETAMINOPHEN 975 MG: 325 TABLET ORAL at 09:39

## 2024-11-13 RX ADMIN — LIDOCAINE HYDROCHLORIDE 0.2 ML: 10 INJECTION, SOLUTION EPIDURAL; INFILTRATION; INTRACAUDAL; PERINEURAL at 09:54

## 2024-11-13 RX ADMIN — FENTANYL CITRATE 100 MCG: 50 INJECTION INTRAMUSCULAR; INTRAVENOUS at 10:40

## 2024-11-13 RX ADMIN — KETOROLAC TROMETHAMINE 30 MG: 30 INJECTION, SOLUTION INTRAMUSCULAR at 12:21

## 2024-11-13 RX ADMIN — PHENYLEPHRINE HYDROCHLORIDE 100 MCG: 10 INJECTION INTRAVENOUS at 10:46

## 2024-11-13 RX ADMIN — SODIUM CHLORIDE, POTASSIUM CHLORIDE, SODIUM LACTATE AND CALCIUM CHLORIDE: 600; 310; 30; 20 INJECTION, SOLUTION INTRAVENOUS at 09:54

## 2024-11-13 RX ADMIN — SUGAMMADEX 200 MG: 100 INJECTION, SOLUTION INTRAVENOUS at 12:21

## 2024-11-13 RX ADMIN — HYDROMORPHONE HYDROCHLORIDE 0.4 MG: 0.2 INJECTION, SOLUTION INTRAMUSCULAR; INTRAVENOUS; SUBCUTANEOUS at 13:33

## 2024-11-13 RX ADMIN — ONDANSETRON 4 MG: 2 INJECTION INTRAMUSCULAR; INTRAVENOUS at 12:21

## 2024-11-13 RX ADMIN — HYDROXYZINE HYDROCHLORIDE 25 MG: 25 TABLET, FILM COATED ORAL at 13:45

## 2024-11-13 RX ADMIN — METRONIDAZOLE 500 MG: 500 INJECTION, SOLUTION INTRAVENOUS at 09:56

## 2024-11-13 RX ADMIN — SODIUM CHLORIDE, POTASSIUM CHLORIDE, SODIUM LACTATE AND CALCIUM CHLORIDE: 600; 310; 30; 20 INJECTION, SOLUTION INTRAVENOUS at 12:37

## 2024-11-13 RX ADMIN — PHENYLEPHRINE HYDROCHLORIDE 200 MCG: 10 INJECTION INTRAVENOUS at 11:12

## 2024-11-13 RX ADMIN — ROCURONIUM BROMIDE 50 MG: 50 INJECTION, SOLUTION INTRAVENOUS at 10:40

## 2024-11-13 RX ADMIN — KETAMINE HYDROCHLORIDE 50 MG: 10 INJECTION INTRAMUSCULAR; INTRAVENOUS at 10:54

## 2024-11-13 RX ADMIN — PHENYLEPHRINE HYDROCHLORIDE 200 MCG: 10 INJECTION INTRAVENOUS at 11:20

## 2024-11-13 RX ADMIN — ROCURONIUM BROMIDE 50 MG: 50 INJECTION, SOLUTION INTRAVENOUS at 11:04

## 2024-11-13 RX ADMIN — Medication 2 G: at 10:32

## 2024-11-13 RX ADMIN — PROPOFOL 200 MG: 10 INJECTION, EMULSION INTRAVENOUS at 10:40

## 2024-11-13 ASSESSMENT — ACTIVITIES OF DAILY LIVING (ADL)
ADLS_ACUITY_SCORE: 0

## 2024-11-13 NOTE — ANESTHESIA CARE TRANSFER NOTE
Patient: Tram Bal    Procedure: Procedure(s):  HYSTERECTOMY, TOTAL, LAPAROSCOPIC, bilateral salpingectomy, cystoscopy       Diagnosis: Abnormal cervical Papanicolaou smear, unspecified abnormal pap finding [R87.619]  Diagnosis Additional Information: No value filed.    Anesthesia Type:   General     Note:    Oropharynx: oropharynx clear of all foreign objects and spontaneously breathing  Level of Consciousness: awake  Oxygen Supplementation: room air    Independent Airway: airway patency satisfactory and stable  Dentition: dentition unchanged  Vital Signs Stable: post-procedure vital signs reviewed and stable  Report to RN Given: handoff report given  Patient transferred to: PACU    Handoff Report: Identifed the Patient, Identified the Reponsible Provider, Reviewed the pertinent medical history, Discussed the surgical course, Reviewed Intra-OP anesthesia mangement and issues during anesthesia, Set expectations for post-procedure period and Allowed opportunity for questions and acknowledgement of understanding      Vitals:  Vitals Value Taken Time   /76 11/13/24 1333   Temp     Pulse 71 11/13/24 1337   Resp 11 11/13/24 1337   SpO2 97 % 11/13/24 1337   Vitals shown include unfiled device data.    Electronically Signed By: DEON Gonsalez CRNA  November 13, 2024  1:39 PM

## 2024-11-13 NOTE — OP NOTE
[facility] Gynecologic Operative Note   Tram Bal  [id number]  11/13/2024    Preoperative Diagnosis: recurrent cervical dysplasia    Postoperative Diagnosis: same     Procedure:   1. Total laparoscopic hysterectomy with bilateral salpingectomy  2. Cystoscopy    Surgeon: Nicole Coelho MD   Assist: Clifford CAPELLAN    This assistance of this surgeon was required due to the need for additional skilled surgical hands to retract and hold instruments due to the nature of the surgical procedure and risk of complications.   Specific reasons: elevated BMI    Anesthesia: GETA  Complications: none apparent     EBL: 75 mL   IVF: see anesthesia notes  UOP: see anesthesia notes    Findings: Exam under anesthesia revealed normal appearing external genitalia, vaginal mucosa, cervix.  Upon entry of the abdomen with the laparoscope showed normal appearing uterus, ovaries and fallopian tubes.  A survey of the upper abdomen showed normal anatomy. Cystoscopy with no evidence of bladder injury and normal appearing bilateral ureteral jets.      Specimen: Uterus, cervix, bilateral fallopian tubes     Indications: Tram Bal is a 39 year old who presented with recurrent cervical dysplasia.  Reviewed management options with the patient and she desired definitive management, understands she will still need vaginal paps given history of high grade dysplasia. A total laparoscopic hysterectomy with bilateral salpingectomy and cystoscopy was discussed.  All risks, benefits and alternatives were discussed and written informed consent was obtained.     Procedure: The patient was taken to the operating room where general anesthesia was induced without difficulty. She was then examined under anesthesia with the above noted findings. She was then prepared and draped in the normal sterile fashion in the dorsal lithotomy position using yellow fin stirrups. Attention was turned to the vagina. A bivalve speculum was placed for  visualization of the cervix and the anterior lip of the cervix was then grasped with a single-tooth tenaculum. The cervix was then serially dilated to 7mm and deliniator manipulator placed in standard fashion.     Attention was then turned to the abdomen where a 5mm infra-umbilical skin incision was made. The veres needle was then introduced into the peritoneal cavity while tenting the abdominal wall.  Given lack of adequate drop with a water-filled syringe did proceed to visi-port placement under direct visualization.  The gas was turned on and pneumoperitoneum was achieved using CO2 gas. The trocar and sleeve were then advanced without difficulty into the abdomen where intra-abdominal placement was confirmed with the laparoscope. A second 5mm skin incision was then made in the RLQ and the trocar and sleeve advanced under direct visualization. A third skin incision, 5 mm, was made in the LLQ and the trocar and sleeve advanced under direct visualization.     A survey of the patient's abdomen and pelvis was made with the above noted findings. Attention turned to the pelvis were left fallopian tube was grasped and elevated and Ligasure used in stepwise fashion along misosalpinx from fimbriae to level of cornua. Next the uteroovarian and round ligaments were transected using ligasure. The anterior leaf of the broad ligament was then incised along the bladder reflection to the midline. In a similar fashion, right  fallopian tube was grasped and elevated and Ligasure used in stepwise fashion along misosalpinx from fimbriae to level of cornua. Next the right uteroovarian and round ligaments were transected using ligasure.  From this side, the anterior leaf of the broad ligament was incised along the bladder reflection to the midline. A bladder flap was mobilized and the peritoneum on the vesicouterine fold was incised to mobilize the bladder. The uterine arteries were then transected and ligated using the Ligasure device,  down to the level of the colpotomy ring. The vagina was transected along the Koh cup using the monopolar hook, resulting in separation of the uterus and attached tubes. The uterus, tubes were then delivered through the vagina.     Attention then turned to the vagina where colpotomy edges were grasped with Allis and series of figure of X sutures of 0-vicryl were used to close the vaginal cuff with good effect.      Repeat evaluation of the pelvis using laparoscope was undertaken with adequate hemostasis noted of surgical sites after small amount of cautery on peritoneal edge.     A cystoscopy was performed that showed bilateral ureteral jets, confirming ureteral patency and no evidence of bladder injury.    Then, the abdomen was the desufflated and all trocars were then removed. The skin incisions were then closed using 4-0 monocryl in a subcuticular fashion. All instruments were removed from the vagina and the casarez catheter was replaced.     The patient tolerated the procedure well. Ancef  was given during the procedure. Sponge, lap and needle counts were correct. The patient was taken to the recovery area in stable condition.    Nicole Coelho MD

## 2024-11-13 NOTE — DISCHARGE INSTRUCTIONS
Same Day Surgery Discharge Instructions  Special Precautions After Surgery - Adult    It is not unusual to feel lightheaded or faint, up to 24 hours after surgery or while taking pain medication.  If you have these symptoms; sit for a few minutes before standing and have someone assist you when getting up.  You should rest and relax for the next 24 hours and must have someone stay with you for at least 24 hours after your discharge.  DO NOT DRIVE any vehicle or operate mechanical equipment for 24 hours following the end of your surgery.  DO NOT DRIVE while taking narcotic pain medications that have been prescribed by your physician.  If you had a limb operated on, you must be able to use it fully to drive.  DO NOT drink alcoholic beverages for 24 hours following surgery or while taking prescription pain medication.  Drink clear liquids (apple juice, ginger ale, broth, 7-Up, etc.).  Progress to your regular diet as you feel able.  Any questions call your physician and do not make important decisions for 24 hours.    Nausea and Vomiting: Nausea and vomiting can occur any time after receiving anesthesia. If you experience nausea and vomiting we encourage you to move to a clear liquid diet and advance your diet as tolerated. If nausea and vomiting do not improve within 12 hours please call the surgeon or present to the Emergency department.     Break-through Bleeding: If your experience bleeding from your surgical site apply pressure and additional dressing per nurse instruction. For simple problems such as a saturated dressing, you may need to reinforce the dressing with more gauze and tape and put slight pressure on the site. If bleeding does not subside contact the surgeon or present to the Emergency Department.    Post-op Infection: If you develop a fever of 100.4 or greater, have pus like drainage, redness, swelling or severe pain at the surgical site not alleviated with pain medications; please  contact the surgeon or present to the Emergency Department.     Medications:  Acetaminophen (Tylenol):  Next dose: 4:00pm.  Ibuprofen (Motrin, Advil):  Next dose: 6:20pm.  Follow the instructions on the bottle.  __________________________________________________________________________________________________________________________________  IMPORTANT NUMBERS:    [facility] [place]:  [phone], [phone]  Pharmacy:  [phone]  Same Day Surgery:  [phone], for general post-op questions call Monday - Thursday until 8:30 p.m., Fridays until 6:00 p.m.   Mental Health Mobile Crisis line: [phone]                                                                      OB Clinic:  [phone]

## 2024-11-13 NOTE — ANESTHESIA PROCEDURE NOTES
Airway       Patient location during procedure: OR       Procedure Start/Stop Times: 11/13/2024 10:42 AM  Staff -        CRNA: Nathen Kerns APRN CRNA       Performed By: CRNA  Consent for Airway        Urgency: elective  Indications and Patient Condition       Indications for airway management: laisha-procedural       Induction type:intravenous       Mask difficulty assessment: 1 - vent by mask    Final Airway Details       Final airway type: endotracheal airway       Successful airway: ETT - single and Oral  Endotracheal Airway Details        ETT size (mm): 7.0       Cuffed: yes       Successful intubation technique: video laryngoscopy       VL Blade Size: Blue 3       Grade View of Cords: 1       Adjucts: stylet       Position: Right       Measured from: lips       Secured at (cm): 20       Bite block used: None    Post intubation assessment        Placement verified by: capnometry, equal breath sounds and chest rise        Number of attempts at approach: 1       Number of other approaches attempted: 0       Secured with: tape       Ease of procedure: easy       Dentition: Intact and Unchanged    Medication(s) Administered   Medication Administration Time: 11/13/2024 10:42 AM

## 2024-11-13 NOTE — ANESTHESIA POSTPROCEDURE EVALUATION
Patient: Tram Bal    Procedure: Procedure(s):  HYSTERECTOMY, TOTAL, LAPAROSCOPIC, bilateral salpingectomy, cystoscopy       Anesthesia Type:  General    Note:  Disposition: Outpatient   Postop Pain Control: Uneventful            Sign Out: Well controlled pain   PONV: No   Neuro/Psych: Uneventful            Sign Out: Acceptable/Baseline neuro status   Airway/Respiratory: Uneventful            Sign Out: Acceptable/Baseline resp. status   CV/Hemodynamics: Uneventful            Sign Out: Acceptable CV status; No obvious hypovolemia; No obvious fluid overload   Other NRE: NONE   DID A NON-ROUTINE EVENT OCCUR? No           Last vitals:  Vitals Value Taken Time   /69 11/13/24 1400   Temp     Pulse 78 11/13/24 1401   Resp 11 11/13/24 1401   SpO2 94 % 11/13/24 1401   Vitals shown include unfiled device data.    Electronically Signed By: DEON Gonsalez CRNA  November 13, 2024  2:25 PM

## 2024-11-21 ENCOUNTER — TELEPHONE (OUTPATIENT)
Dept: FAMILY MEDICINE | Facility: CLINIC | Age: 39
End: 2024-11-21
Payer: COMMERCIAL

## 2024-11-21 NOTE — TELEPHONE ENCOUNTER
Patient Quality Outreach    Patient is due for the following:   Depression  -  PHQ-9 needed    Action(s) Taken:   Patient was assigned appropriate questionnaire to complete    Type of outreach:    Sent [email or web address] message.    Questions for provider review:    None           Michelle Steele, CMA

## 2024-12-17 ENCOUNTER — PATIENT OUTREACH (OUTPATIENT)
Dept: CARE COORDINATION | Facility: CLINIC | Age: 39
End: 2024-12-17
Payer: COMMERCIAL

## 2024-12-23 ENCOUNTER — OFFICE VISIT (OUTPATIENT)
Dept: OBGYN | Facility: CLINIC | Age: 39
End: 2024-12-23
Payer: COMMERCIAL

## 2024-12-23 VITALS
SYSTOLIC BLOOD PRESSURE: 124 MMHG | DIASTOLIC BLOOD PRESSURE: 86 MMHG | TEMPERATURE: 97.4 F | BODY MASS INDEX: 35.19 KG/M2 | WEIGHT: 205 LBS | HEART RATE: 84 BPM

## 2024-12-23 DIAGNOSIS — Z09 POSTOP CHECK: Primary | ICD-10-CM

## 2024-12-23 PROCEDURE — 99024 POSTOP FOLLOW-UP VISIT: CPT | Performed by: OBSTETRICS & GYNECOLOGY

## 2024-12-23 NOTE — PROGRESS NOTES
[facility]  OB/GYN     CC: Post-op     SUBJECTIVE:    Tram is a 39 year old female who is 6w s/p TLH, BS, doing well. No concerns. She reports that she feels well and her strength is improving every day   Ambulation: no issues  Eating/Drinking: no issues  Urination: no issues  Defecation: no issues  Pain: well-controlled , no meds    Past Medical History:   Diagnosis Date    Abnormal Pap smear of cervix 2004    2005, 2006, 2010, 2016, 2017    Cervical high risk HPV (human papillomavirus) test positive 2017 2018, 2020    Depressive disorder 2016    Displacement of lumbar intervertebral disc 05/15/2008    Esophageal reflux     Hx of colposcopy with cervical biopsy 05/22/2017 5/22/17:[place]- Benign.     Thyroid disease 2022       Past Surgical History:   Procedure Laterality Date    ABDOMEN SURGERY  3/2023    Tummy tuck    COLPOSCOPY,LOOP ELECTRD CERVIX EXCIS  08/11/2005    JARETH III    GI SURGERY  04/14/2011    LAP BANDING for obesity, pre-DM    HYSTERECTOMY, TOTAL, LAPAROSCOPIC, WITH SALPINGECTOMY, CYSTOSCOPY Bilateral 11/13/2024    Procedure: HYSTERECTOMY, TOTAL, LAPAROSCOPIC, bilateral salpingectomy, cystoscopy;  Surgeon: Nicole Coelho MD;  Location: [facility] OR    LAPAROSCOPIC REMOVAL GASTRIC ADJUSTABLE BAND N/A 5/17/2024    Procedure: Robotic assisted laparoscopic removal of adjustable gastric band;  Surgeon: Darrel Zaragoza MD;  Location:  OR    [facility] NONSPECIFIC PROCEDURE  1999    Oral surgery       Current Outpatient Medications   Medication Sig Dispense Refill    clotrimazole (LOTRIMIN) 1 % external cream Apply topically 2 times daily 60 g 1    FLUoxetine (PROZAC) 40 MG capsule Take 1 capsule (40 mg) by mouth daily 90 capsule 3    hydrOXYzine HCl (ATARAX) 25 MG tablet Take 0.5-1 tablets (12.5-25 mg) by mouth 3 times daily as needed for anxiety. 30 tablet 1    Multiple Vitamins-Iron TABS Take by mouth daily       omeprazole (PRILOSEC) 40 MG DR capsule TAKE 1 CAPSULE(40  "MG) BY MOUTH DAILY 90 capsule 0    propranolol (INDERAL) 10 MG tablet TAKE 1 TABLET(10 MG) BY MOUTH TWICE DAILY 180 tablet 2    loratadine (CLARITIN) 10 MG tablet Take 10 mg by mouth as needed for allergies (Patient not taking: Reported on 12/23/2024)      senna-docusate (SENOKOT-S/PERICOLACE) 8.6-50 MG tablet Take 1-2 tablets by mouth 2 times daily as needed for constipation (While on oral opioids.). (Patient not taking: Reported on 12/23/2024) 30 tablet 0       OBJECTIVE:  /86 (BP Location: Left arm, Patient Position: Chair, Cuff Size: Adult Regular)   Pulse 84   Temp 97.4  F (36.3  C) (Tympanic)   Wt 93 kg (205 lb)   LMP 09/27/2024 (Approximate)   BMI 35.19 kg/m    Estimated body mass index is 35.19 kg/m  as calculated from the following:    Height as of 11/13/24: 1.626 m (5' 4\").    Weight as of this encounter: 93 kg (205 lb).    PHYSICAL EXAM:    GENERAL: Pleasant, talkative and in no apparent distress   HEENT:  Normocephalic, non-tender.     LUNGS:  No increased work of breathing   HEART:  Regular rate, well perfused   ABDOMEN:  Bowel sounds are present throughout.  The abdomen is soft, appropriately tender.   INCISION: C/D/I   : normal appearing external genitalia, vaginal mucosa, sutures still visible at apex, no defects seen or palpated   NEURO:  No focal deficits   MSK:  Lower extremities without edema or tenderness.          ASSESSMENT/PLAN: 39 year old female who is 6w s/p TLH, BS. Understands will still need Paps until 25 years from high grade dysplasia (~2030). Recommended to return in one year to Gyn or FM clinic for repeat pap. Appears to be healing well and cleared to go back to all usual activities, did recommend additional two weeks off from intercourse given sutures still dissolving. Overall cuff appears to be healing well. Return otherwise PRN for new/concerning sx    Nicole Coelho MD   12/23/2024 11:56 AM      "

## 2024-12-23 NOTE — NURSING NOTE
"Initial /86 (BP Location: Left arm, Patient Position: Chair, Cuff Size: Adult Regular)   Pulse 84   Temp 97.4  F (36.3  C) (Tympanic)   Wt 93 kg (205 lb)   LMP 09/27/2024 (Approximate)   BMI 35.19 kg/m   Estimated body mass index is 35.19 kg/m  as calculated from the following:    Height as of 11/13/24: 1.626 m (5' 4\").    Weight as of this encounter: 93 kg (205 lb). .    Ramya Estrada MA    "

## 2024-12-31 ENCOUNTER — PATIENT OUTREACH (OUTPATIENT)
Dept: CARE COORDINATION | Facility: CLINIC | Age: 39
End: 2024-12-31
Payer: COMMERCIAL

## 2025-01-02 ENCOUNTER — APPOINTMENT (OUTPATIENT)
Dept: GENERAL RADIOLOGY | Facility: CLINIC | Age: 40
End: 2025-01-02
Attending: PHYSICIAN ASSISTANT
Payer: COMMERCIAL

## 2025-01-02 ENCOUNTER — HOSPITAL ENCOUNTER (EMERGENCY)
Facility: CLINIC | Age: 40
Discharge: HOME | End: 2025-01-02
Attending: PHYSICIAN ASSISTANT | Admitting: PHYSICIAN ASSISTANT
Payer: COMMERCIAL

## 2025-01-02 VITALS
HEART RATE: 86 BPM | TEMPERATURE: 99.2 F | DIASTOLIC BLOOD PRESSURE: 85 MMHG | RESPIRATION RATE: 16 BRPM | SYSTOLIC BLOOD PRESSURE: 149 MMHG | OXYGEN SATURATION: 99 %

## 2025-01-02 DIAGNOSIS — M54.50 LOW BACK PAIN: ICD-10-CM

## 2025-01-02 DIAGNOSIS — S93.401A RIGHT ANKLE SPRAIN: ICD-10-CM

## 2025-01-02 PROCEDURE — 99213 OFFICE O/P EST LOW 20 MIN: CPT | Performed by: PHYSICIAN ASSISTANT

## 2025-01-02 PROCEDURE — G0463 HOSPITAL OUTPT CLINIC VISIT: HCPCS

## 2025-01-02 PROCEDURE — 73610 X-RAY EXAM OF ANKLE: CPT | Mod: RT

## 2025-01-02 ASSESSMENT — COLUMBIA-SUICIDE SEVERITY RATING SCALE - C-SSRS
6. HAVE YOU EVER DONE ANYTHING, STARTED TO DO ANYTHING, OR PREPARED TO DO ANYTHING TO END YOUR LIFE?: NO
1. IN THE PAST MONTH, HAVE YOU WISHED YOU WERE DEAD OR WISHED YOU COULD GO TO SLEEP AND NOT WAKE UP?: NO
2. HAVE YOU ACTUALLY HAD ANY THOUGHTS OF KILLING YOURSELF IN THE PAST MONTH?: NO

## 2025-01-02 ASSESSMENT — ACTIVITIES OF DAILY LIVING (ADL): ADLS_ACUITY_SCORE: 41

## 2025-01-02 NOTE — Clinical Note
Tram Bal was seen and treated in our emergency department on 1/2/2025.    I recommend she rest her right ankle with limited activity only as tolerated by pain for the next 7 days or until her next follow-up appointment.  During this time she may require more frequent rest.  She should avoid prolonged standing, kneeling, squatting, repetitive twisting of the ankle or any other activities that exacerbate her pain.      Sincerely,     [facility] Emergency Dept

## 2025-01-02 NOTE — ED PROVIDER NOTES
History     Chief Complaint   Patient presents with    Ankle Pain     RT    Back Pain     HPI  Tram Bal is a 39 year old female who presents  to urgent care with concern over right ankle and back pain after several injuries.  Patient sustained a falltwisting her ankle as she went down 2 weeks ago.  That she had she sprained her ankle treated symptomatically.  While carrying an object into her garage yesterday she missed the last step and had a subsequent fall.  Since she is complained of increasing lateral ankle pain as well as low back pain overlying her sacrum.  She is unaware of any ecchymosis, lacerations or abrasions.  No distal numbness or paresthesias.  No cough, chest pain, dyspnea, wheezing, nausea, vomiting, abdominal pain, dysuria, hematuria, change in bowel or bladder control or saddle numbness or paresthesias.  She has attempted to treat with ibuprofen up to     Allergies:  Allergies   Allergen Reactions    Famvir [Famciclovir] Hives     Problem List:    Patient Active Problem List    Diagnosis Date Noted    Postsurgical malabsorption 07/06/2023     Priority: Medium    Iron deficiency 01/16/2020     Priority: Medium    Vitamin D deficiency 10/28/2018     Priority: Medium    Mild major depression (H)      Priority: Medium    Overweight with body mass index (BMI) of 29 to 29.9 in adult 08/07/2018     Priority: Medium    Bariatric surgery status 01/10/2017     Priority: Medium    Generalized anxiety disorder 03/29/2016     Priority: Medium    Tobacco abuse, in remission 07/13/2011     Priority: Medium    LAP-BAND surgery status 05/16/2011     Priority: Medium    GERD without esophagitis 04/19/2010     Priority: Medium    Displacement of lumbar intervertebral disc 05/15/2008     Priority: Medium    JARETH III (cervical intraepithelial neoplasia III) 08/22/2005     Priority: Medium     7/04 [facility]IL   10/04 [place] JARETH I  4/05 LSIL  6/05 [place] JARETH II & III  8/05 LEEP JARETH III  12/05 NIL pap  4/06 ASCUS, neg  HPV  10/06, 11/07, 12/08 NIL paps  1/10 ASCUS, neg HPV  2011, 2012, 2013 NIL paps  3/31/14 NIL/neg HPV. Plan cotest in 1 year.  4/20/15 NIL/neg HPV. Plan: cotest in 3 years.  4/25/16 ASCUS, Neg HPV. Cotest in 1 year.   4/28/17 ASCUS, +High Risk HPV (Neg 16/18).  Plan [place]  5/22/17 [place] Benign. ASCUS pap, +HR HPV (Not types 16/18). Plan cotest in 1 year.   10/26/18 NIL Pap, + HR HPV (Neg 16/18). Plan [facility]  4/1/19 [place] E[facility] - atypia, favor reactive change.  NIL pap, + HR HPV (not 16 or 18). Plan: cotest in 1 yr   11/23/20 NIL Pap, + HR HPV (neg 16/18). [place] due by 2/23/2021 6/29/21 [place] [facility] - no JARETH. Plan: cotest in 1 year  8/8/22 Lost to follow-up for pap tracking   10/10/22 NIL pap, + HR HPV (not 16 or 18) colp by 1/10/23  1/6/23 [place] E[facility] neg. NIL pap, +HR HPV (not 16/18). Plan: cotest in 1 year   1/16/24 NIL pap, +HR HPV 18 & other. Plan: colposcopy due before 4/16/24 1/22/24 left message / mychart sent / mychart read  3/14/24 Reminder mychart  4/16/24 [place] not done. Tracking updated for 6 mo colp/pap due 7/16/24.    6/26/24 Reminder mychart -- read  7/29/24 Lost to follow-up for pap tracking           Past Medical History:    Past Medical History:   Diagnosis Date    Abnormal Pap smear of cervix 2004    Cervical high risk HPV (human papillomavirus) test positive 2017    Depressive disorder 2016    Displacement of lumbar intervertebral disc 05/15/2008    Esophageal reflux     Hx of colposcopy with cervical biopsy 05/22/2017    Thyroid disease 2022     Past Surgical History:    Past Surgical History:   Procedure Laterality Date    ABDOMEN SURGERY  3/2023    Tummy tuck    COLPOSCOPY,LOOP ELECTRD CERVIX EXCIS  08/11/2005    JARETH III    GI SURGERY  04/14/2011    LAP BANDING for obesity, pre-DM    HYSTERECTOMY, TOTAL, LAPAROSCOPIC, WITH SALPINGECTOMY, CYSTOSCOPY Bilateral 11/13/2024    Procedure: HYSTERECTOMY, TOTAL, LAPAROSCOPIC, bilateral salpingectomy, cystoscopy;  Surgeon: Nicole Coelho MD;   Location: [facility] OR    LAPAROSCOPIC REMOVAL GASTRIC ADJUSTABLE BAND N/A 2024    Procedure: Robotic assisted laparoscopic removal of adjustable gastric band;  Surgeon: Darrel Zaragoza MD;  Location:  OR    ZZC NONSPECIFIC PROCEDURE      Oral surgery       Family History:    Family History   Problem Relation Age of Onset    Thyroid Disease Mother     Unknown/Adopted Mother     Hyperlipidemia Father     Hypertension Paternal Grandmother     Hyperlipidemia Paternal Grandmother     Diabetes Paternal Aunt     Hypertension Paternal Aunt     Lipids Paternal Aunt     Thyroid Disease Maternal Half-Brother     Unknown/Adopted No family hx of         unaware of maternal grandparents history as pt mom was adopted    Breast Cancer No family hx of     Cancer - colorectal No family hx of     Gynecology No family hx of         no ovarian cancer    Coronary Artery Disease No family hx of     Cerebrovascular Disease No family hx of     Thyroid Disease No family hx of     Osteoporosis No family hx of        Social History:  Marital Status:  Single [1]  Social History     Tobacco Use    Smoking status: Former     Current packs/day: 0.00     Types: Cigarettes     Quit date: 2007     Years since quittin.6    Smokeless tobacco: Never    Tobacco comments:     quit smoking may 2010    Vaping Use    Vaping status: Never Used   Substance Use Topics    Alcohol use: Yes     Comment: occasionally    Drug use: No        Medications:    clotrimazole (LOTRIMIN) 1 % external cream  FLUoxetine (PROZAC) 40 MG capsule  hydrOXYzine HCl (ATARAX) 25 MG tablet  loratadine (CLARITIN) 10 MG tablet  Multiple Vitamins-Iron TABS  omeprazole (PRILOSEC) 40 MG DR capsule  propranolol (INDERAL) 10 MG tablet  senna-docusate (SENOKOT-S/PERICOLACE) 8.6-50 MG tablet      Review of Systems  Per HPI   Physical Exam   BP: (!) 149/85  Pulse: 86  Temp: 99.2  F (37.3  C)  Resp: 16  SpO2: 99 %  Physical Exam  Constitutional:       General: She is not in  acute distress.     Appearance: She is not ill-appearing or toxic-appearing.   HENT:      Head: Normocephalic and atraumatic.   Cardiovascular:      Rate and Rhythm: Normal rate and regular rhythm.      Pulses:           Dorsalis pedis pulses are 2+ on the right side.        Posterior tibial pulses are 2+ on the right side.      Heart sounds: No murmur heard.     No friction rub. No gallop.   Pulmonary:      Effort: Pulmonary effort is normal. No respiratory distress.      Breath sounds: Normal breath sounds. No wheezing, rhonchi or rales.   Musculoskeletal:      Cervical back: Normal.      Thoracic back: Normal.      Lumbar back: Tenderness (overlying sacrum) present. No swelling, deformity or lacerations. Decreased range of motion.      Right ankle: Swelling present. No deformity, ecchymosis or lacerations. Tenderness present over the lateral malleolus. Decreased range of motion.      Right Achilles Tendon: Normal.      Right foot: Normal range of motion. No swelling, deformity, tenderness, bony tenderness or crepitus.   Skin:     General: Skin is warm and dry.      Findings: No abrasion, ecchymosis, erythema, laceration or rash.   Neurological:      Mental Status: She is alert and oriented to person, place, and time.      Sensory: No sensory deficit.      Deep Tendon Reflexes:      Reflex Scores:       Patellar reflexes are 2+ on the right side and 2+ on the left side.      ED Course        Procedures       Critical Care time:  none     Results for orders placed or performed during the hospital encounter of 01/02/25   Ankle XR, G/E 3 views, right     Status: None    Narrative    XR ANKLE RIGHT G/E 3 VIEWS 1/2/2025 3:51 PM    HISTORY: pain after fall, rule out fracture    COMPARISON: None.      Impression    IMPRESSION: No fracture. The ankle mortise is intact. No soft tissue  swelling.    ZIYAD ARSHAD MD         SYSTEM ID:  [id number]          No results found for this or any previous visit (from the past 24  hours).  Medications - No data to display    Assessments & Plan (with Medical Decision Making)     I have reviewed the nursing notes.  I have reviewed the findings, diagnosis, plan and need for follow up with the patient.       New Prescriptions    No medications on file     Final diagnoses:   Right ankle sprain   Low back pain     39-year-old female presents urgent care with concern over right ankle and low back pain after initial injury 2 weeks ago recent fall within the last 24 hours.  Physical exam findings significant for tenderness palpation, swelling about the low back/sacrum as well lateral malleolus of the ankle, distal neurovascular status intact.  I discussed for/benefits of imaging and patient agreed to obtain x-ray of her ankle which was negative for acute fracture/bony abnormality.  She agreed to defer imaging of her low back as I do not suspect spinal fracture given mechanism of injury, current exam findings.  Ankle pain most consistent with ankle sprain lower suspicion for occult fracture.  Back pain is likely mechanical soft tissue injury differential includes sprain, spasm.  Low suspicion for DVT, spinal stenosis, herniated disc.  She did not have any worrisome red flag symptoms to suggest cauda equina, epidural abscess or other emergent need for MRI.  She was discharged home stable with instructions for symptomatic treatment with rest, ice, OTC pain relievers as tolerated.  Work restrictions placed for the next several days.  Follow-up if no improvement within the next 7 to 10 days.  Worrisome reasons to return to the ER/UC sooner discussed.    Disclaimer: This note consists of symbols derived from keyboarding, dictation, and/or voice recognition software. As a result, there may be errors in the script that have gone undetected.  Please consider this when interpreting information found in the chart.    1/2/2025   [facility] EMERGENCY DEPT       Veronique Araya PA-C  01/03/25  1019

## 2025-01-02 NOTE — ED TRIAGE NOTES
RT ankle pain origionally from slipping ulises cordell but then slipped again on Sunday. Also having low back pain.  Liza Thompson MA

## 2025-01-08 ENCOUNTER — VIRTUAL VISIT (OUTPATIENT)
Dept: FAMILY MEDICINE | Facility: CLINIC | Age: 40
End: 2025-01-08
Payer: COMMERCIAL

## 2025-01-08 DIAGNOSIS — F32.1 MODERATE MAJOR DEPRESSION (H): ICD-10-CM

## 2025-01-08 DIAGNOSIS — E66.01 CLASS 2 SEVERE OBESITY WITH BODY MASS INDEX (BMI) OF 35 TO 39.9 WITH SERIOUS COMORBIDITY (H): Primary | ICD-10-CM

## 2025-01-08 DIAGNOSIS — E66.812 CLASS 2 SEVERE OBESITY WITH BODY MASS INDEX (BMI) OF 35 TO 39.9 WITH SERIOUS COMORBIDITY (H): Primary | ICD-10-CM

## 2025-01-08 DIAGNOSIS — E05.00 GRAVES DISEASE: ICD-10-CM

## 2025-01-08 DIAGNOSIS — F41.1 GENERALIZED ANXIETY DISORDER: ICD-10-CM

## 2025-01-08 PROCEDURE — 98006 SYNCH AUDIO-VIDEO EST MOD 30: CPT | Performed by: NURSE PRACTITIONER

## 2025-01-08 RX ORDER — PROPRANOLOL HYDROCHLORIDE 10 MG/1
10 TABLET ORAL 2 TIMES DAILY
Qty: 180 TABLET | Refills: 3 | Status: SHIPPED | OUTPATIENT
Start: 2025-01-08

## 2025-01-08 RX ORDER — FLUOXETINE 40 MG/1
40 CAPSULE ORAL DAILY
Qty: 90 CAPSULE | Refills: 3 | Status: SHIPPED | OUTPATIENT
Start: 2025-01-08

## 2025-01-08 ASSESSMENT — ANXIETY QUESTIONNAIRES
1. FEELING NERVOUS, ANXIOUS, OR ON EDGE: SEVERAL DAYS
IF YOU CHECKED OFF ANY PROBLEMS ON THIS QUESTIONNAIRE, HOW DIFFICULT HAVE THESE PROBLEMS MADE IT FOR YOU TO DO YOUR WORK, TAKE CARE OF THINGS AT HOME, OR GET ALONG WITH OTHER PEOPLE: NOT DIFFICULT AT ALL
5. BEING SO RESTLESS THAT IT IS HARD TO SIT STILL: SEVERAL DAYS
3. WORRYING TOO MUCH ABOUT DIFFERENT THINGS: NOT AT ALL
6. BECOMING EASILY ANNOYED OR IRRITABLE: NOT AT ALL
2. NOT BEING ABLE TO STOP OR CONTROL WORRYING: NOT AT ALL
GAD7 TOTAL SCORE: 3
7. FEELING AFRAID AS IF SOMETHING AWFUL MIGHT HAPPEN: NOT AT ALL
GAD7 TOTAL SCORE: 3

## 2025-01-08 ASSESSMENT — PATIENT HEALTH QUESTIONNAIRE - PHQ9
SUM OF ALL RESPONSES TO PHQ QUESTIONS 1-9: 1
10. IF YOU CHECKED OFF ANY PROBLEMS, HOW DIFFICULT HAVE THESE PROBLEMS MADE IT FOR YOU TO DO YOUR WORK, TAKE CARE OF THINGS AT HOME, OR GET ALONG WITH OTHER PEOPLE: NOT DIFFICULT AT ALL
5. POOR APPETITE OR OVEREATING: SEVERAL DAYS
SUM OF ALL RESPONSES TO PHQ QUESTIONS 1-9: 1

## 2025-01-08 NOTE — PROGRESS NOTES
Tram is a 39 year old who is being evaluated via a billable video visit.    How would you like to obtain your AVS? MyChart  If the video visit is dropped, the invitation should be resent by: Text to cell phone: [phone]  Will anyone else be joining your video visit? No    Assessment & Plan     Moderate major depression (H)  Stable with current medication   - FLUoxetine (PROZAC) 40 MG capsule; Take 1 capsule (40 mg) by mouth daily.    Generalized anxiety disorder  Stable.   - FLUoxetine (PROZAC) 40 MG capsule; Take 1 capsule (40 mg) by mouth daily.    Graves disease  Stable.   - propranolol (INDERAL) 10 MG tablet; Take 1 tablet (10 mg) by mouth 2 times daily.    Class 2 severe obesity with body mass index (BMI) of 35 to 39.9 with serious comorbidity (H)  BMI elevated at 35 with comorbid GERD. Interested in a trial of following Zepbound in addition to lifestyle changes following significant weight gain after lap band removal. Risks and benefits of medication discussed. No personal or family history of thyroid cancers or of Multiple Endocrine Neoplasia syndrome type 2, written information provided on medication. Follow up in 2-3 months, sooner with problems.   - tirzepatide-Weight Management (ZEPBOUND) 7.5 MG/0.5ML prefilled pen; Inject 0.5 mLs (7.5 mg) subcutaneously every 7 days.  - tirzepatide-Weight Management (ZEPBOUND) 5 MG/0.5ML prefilled pen; Inject 0.5 mLs (5 mg) subcutaneously every 7 days.  - tirzepatide-Weight Management (ZEPBOUND) 2.5 MG/0.5ML prefilled pen; Inject 0.5 mLs (2.5 mg) subcutaneously every 7 days.      Subjective   Tram is a 39 year old, presenting for the following health issues:  Weight Problem (Would like to discuss GLP 1 injections. Did have a lap band for about 13 years, started having complications with it and had removed in March this year. Has had some weight gain since then. Weighs around 205lbs.  ) and Depression (Medication recheck )        1/8/2025    12:01 PM  "  Additional Questions   Roomed by Michelle PORTILLO   Accompanied by self         2025    12:01 PM   Patient Reported Additional Medications   Patient reports taking the following new medications no new meds     Video Start Time:  4:55 PM     History of Present Illness       Reason for visit:  Weight loss injections   She is taking medications regularly.     Depression and Anxiety   How are you doing with your depression since your last visit? No change  How are you doing with your anxiety since your last visit?  No change  Are you having other symptoms that might be associated with depression or anxiety? No  Have you had a significant life event? No   Do you have any concerns with your use of alcohol or other drugs? No    Phentermine caused palpations and feeling like she was \"high\", Topiramate caused muscle spasms under eyes   Lap Band surgery     Social History     Tobacco Use    Smoking status: Former     Current packs/day: 0.00     Types: Cigarettes     Quit date: 2007     Years since quittin.7    Smokeless tobacco: Never    Tobacco comments:     quit smoking may 2010    Vaping Use    Vaping status: Never Used   Substance Use Topics    Alcohol use: Yes     Comment: occasionally    Drug use: No         2024    12:44 PM 2025    12:06 PM 2025    12:11 PM   PHQ   PHQ-9 Total Score 3 1 1    Q9: Thoughts of better off dead/self-harm past 2 weeks Not at all Not at all Not at all        Proxy-reported         2024     2:31 PM 2024    12:44 PM 2025    12:06 PM   CLARA-7 SCORE   Total Score 5 7 3         2025    12:11 PM   Last PHQ-9   1.  Little interest or pleasure in doing things 0    2.  Feeling down, depressed, or hopeless 0    3.  Trouble falling or staying asleep, or sleeping too much 1    4.  Feeling tired or having little energy 0    5.  Poor appetite or overeating 0    6.  Feeling bad about yourself 0    7.  Trouble concentrating 0    8.  Moving slowly or restless 0    Q9: " "Thoughts of better off dead/self-harm past 2 weeks 0    PHQ-9 Total Score 1        Proxy-reported         1/8/2025    12:06 PM   CLARA-7    1. Feeling nervous, anxious, or on edge 1   2. Not being able to stop or control worrying 0   3. Worrying too much about different things 0   4. Trouble relaxing 1   5. Being so restless that it is hard to sit still 1   6. Becoming easily annoyed or irritable 0   7. Feeling afraid, as if something awful might happen 0   CLARA-7 Total Score 3   If you checked any problems, how difficult have they made it for you to do your work, take care of things at home, or get along with other people? Not difficult at all         Review of Systems  Constitutional, HEENT, cardiovascular, pulmonary, gi and gu systems are negative, except as otherwise noted.      Objective    Vitals - Patient Reported  Weight (Patient Reported): 93 kg (205 lb)  Height (Patient Reported): 162.6 cm (5' 4\")  BMI (Based on Pt Reported Ht/Wt): 35.19      Vitals:  No vitals were obtained today due to virtual visit.    Physical Exam   GENERAL: alert and no distress  EYES: Eyes grossly normal to inspection.  No discharge or erythema, or obvious scleral/conjunctival abnormalities.  RESP: No audible wheeze, cough, or visible cyanosis.    SKIN: Visible skin clear. No significant rash, abnormal pigmentation or lesions.  NEURO: Cranial nerves grossly intact.  Mentation and speech appropriate for age.  PSYCH: Appropriate affect, tone, and pace of words        Video-Visit Details    Type of service:  Video Visit   Video End Time:5:07 PM  Originating Location (pt. Location): Home  Distant Location (provider location):  On-site  Platform used for Video Visit: Stefanie  Signed Electronically by: DEON Torres CNP    "

## 2025-03-02 ENCOUNTER — HEALTH MAINTENANCE LETTER (OUTPATIENT)
Age: 40
End: 2025-03-02

## 2025-03-10 ENCOUNTER — TELEPHONE (OUTPATIENT)
Dept: FAMILY MEDICINE | Facility: CLINIC | Age: 40
End: 2025-03-10
Payer: COMMERCIAL

## 2025-03-10 NOTE — TELEPHONE ENCOUNTER
Patient Quality Outreach    Patient is due for the following:   Cervical Cancer Screening - PAP Needed    Action(s) Taken:   Schedule a office visit for pap    Type of outreach:    Sent BizeeBee message.    Questions for provider review:    None           Bailee Kahler

## 2025-04-27 ENCOUNTER — E-VISIT (OUTPATIENT)
Dept: FAMILY MEDICINE | Facility: CLINIC | Age: 40
End: 2025-04-27
Payer: COMMERCIAL

## 2025-04-27 DIAGNOSIS — M54.2 CERVICALGIA: Primary | ICD-10-CM

## 2025-04-28 RX ORDER — CYCLOBENZAPRINE HCL 10 MG
5-10 TABLET ORAL 3 TIMES DAILY PRN
Qty: 30 TABLET | Refills: 1 | Status: SHIPPED | OUTPATIENT
Start: 2025-04-28

## 2025-04-28 NOTE — PATIENT INSTRUCTIONS
Thank you for choosing us for your care. I have placed an order for a prescription so that you can start treatment. View your full visit summary for details by clicking on the link below. Your pharmacist will able to address any questions you may have about the medication.      If you're not feeling better within 2-3 weeks please schedule an appointment.  You can schedule an appointment right here in [facility], or call [phone]  If the visit is for the same symptoms as your eVisit, we'll refund the cost of your eVisit if seen within seven days.

## 2025-05-26 ENCOUNTER — E-VISIT (OUTPATIENT)
Dept: FAMILY MEDICINE | Facility: CLINIC | Age: 40
End: 2025-05-26
Payer: COMMERCIAL

## 2025-05-26 DIAGNOSIS — M54.2 CERVICALGIA: Primary | ICD-10-CM

## 2025-05-27 RX ORDER — PREDNISONE 20 MG/1
20 TABLET ORAL 2 TIMES DAILY
Qty: 10 TABLET | Refills: 0 | Status: SHIPPED | OUTPATIENT
Start: 2025-05-27 | End: 2025-06-01

## 2025-05-31 ENCOUNTER — E-VISIT (OUTPATIENT)
Dept: FAMILY MEDICINE | Facility: CLINIC | Age: 40
End: 2025-05-31
Payer: COMMERCIAL

## 2025-05-31 DIAGNOSIS — Z53.9 ERRONEOUS ENCOUNTER--DISREGARD: Primary | ICD-10-CM

## 2025-06-02 NOTE — TELEPHONE ENCOUNTER
Patient called back, tried to schedule sooner visit, however patient had to work and declined options, (unless you wanted to use virtual visits as f2f?)       Alondra GANDARA  Station

## 2025-06-02 NOTE — TELEPHONE ENCOUNTER
Trudi Guevara, DEON CNP to [facility] Pool (Selected Message)  KM      6/2/25  7:21 AM  Please get Tram into my schedule before Thursday.  Thanks      Left message for pt to call clinic back. Pt has appt with Sally on Thursday, 6/5. Called to see if pt wants to be seen sooner.    Mar Ricks Patient

## 2025-06-06 ENCOUNTER — TELEPHONE (OUTPATIENT)
Dept: FAMILY MEDICINE | Facility: CLINIC | Age: 40
End: 2025-06-06
Payer: COMMERCIAL

## 2025-06-06 NOTE — TELEPHONE ENCOUNTER
Prior Authorization Retail Medication Request    Medication/Dose: wegovy 1 mg   Diagnosis and ICD code (if different than what is on RX):    New/renewal/insurance change PA/secondary ins. PA:  Previously Tried and Failed:    Rationale:      Phone [phone]   Id [id number]      Clinic Information  Preferred routing pool for dept communication: [id number]

## 2025-06-11 NOTE — TELEPHONE ENCOUNTER
LMTRC- When patient calls back please get her current weight and route to PA team.    Michelle Lagos/ Patient

## 2025-06-13 NOTE — TELEPHONE ENCOUNTER
PRIOR AUTHORIZATION DENIED    Medication: WEGOVY 1 MG/0.5ML SC SOAJ  Insurance Company: [employer] - Phone [phone] Fax [phone]  Denial Date: 6/13/2025  Denial Reason(s): Qty limit for 1mg is denied.  Next dose of Wegovy will go thru to insurance.  Appeal Information: see attached  Patient Notified: no

## 2025-06-13 NOTE — TELEPHONE ENCOUNTER
PA Initiation    Medication: WEGOVY 1 MG/0.5ML SC SOAJ  Insurance Company: [employer] - Phone [phone] Fax [phone]  Pharmacy Filling the Rx: [id number] - [address] AT [facility] & [address]  Filling Pharmacy Phone:    Filling Pharmacy Fax: [phone]  Start Date: 6/13/2025

## 2025-06-18 ENCOUNTER — ANCILLARY PROCEDURE (OUTPATIENT)
Dept: GENERAL RADIOLOGY | Facility: CLINIC | Age: 40
End: 2025-06-18
Attending: NURSE PRACTITIONER
Payer: COMMERCIAL

## 2025-06-18 ENCOUNTER — OFFICE VISIT (OUTPATIENT)
Dept: FAMILY MEDICINE | Facility: CLINIC | Age: 40
End: 2025-06-18
Payer: COMMERCIAL

## 2025-06-18 VITALS
TEMPERATURE: 98.2 F | HEART RATE: 84 BPM | OXYGEN SATURATION: 97 % | BODY MASS INDEX: 33.97 KG/M2 | WEIGHT: 199 LBS | DIASTOLIC BLOOD PRESSURE: 84 MMHG | SYSTOLIC BLOOD PRESSURE: 118 MMHG | HEIGHT: 64 IN | RESPIRATION RATE: 18 BRPM

## 2025-06-18 DIAGNOSIS — K21.9 GERD WITHOUT ESOPHAGITIS: ICD-10-CM

## 2025-06-18 DIAGNOSIS — E66.09 CLASS 1 OBESITY DUE TO EXCESS CALORIES WITH SERIOUS COMORBIDITY AND BODY MASS INDEX (BMI) OF 34.0 TO 34.9 IN ADULT: ICD-10-CM

## 2025-06-18 DIAGNOSIS — E66.811 CLASS 1 OBESITY DUE TO EXCESS CALORIES WITH SERIOUS COMORBIDITY AND BODY MASS INDEX (BMI) OF 34.0 TO 34.9 IN ADULT: ICD-10-CM

## 2025-06-18 DIAGNOSIS — M54.2 CERVICALGIA: Primary | ICD-10-CM

## 2025-06-18 DIAGNOSIS — M54.2 CERVICALGIA: ICD-10-CM

## 2025-06-18 PROBLEM — E66.01 CLASS 2 SEVERE OBESITY WITH BODY MASS INDEX (BMI) OF 35 TO 39.9 WITH SERIOUS COMORBIDITY (H): Status: RESOLVED | Noted: 2025-01-08 | Resolved: 2025-06-18

## 2025-06-18 PROBLEM — E66.812 CLASS 2 SEVERE OBESITY WITH BODY MASS INDEX (BMI) OF 35 TO 39.9 WITH SERIOUS COMORBIDITY (H): Status: RESOLVED | Noted: 2025-01-08 | Resolved: 2025-06-18

## 2025-06-18 PROCEDURE — G2211 COMPLEX E/M VISIT ADD ON: HCPCS | Performed by: NURSE PRACTITIONER

## 2025-06-18 PROCEDURE — 3079F DIAST BP 80-89 MM HG: CPT | Performed by: NURSE PRACTITIONER

## 2025-06-18 PROCEDURE — 3074F SYST BP LT 130 MM HG: CPT | Performed by: NURSE PRACTITIONER

## 2025-06-18 PROCEDURE — 99214 OFFICE O/P EST MOD 30 MIN: CPT | Performed by: NURSE PRACTITIONER

## 2025-06-18 PROCEDURE — 72040 X-RAY EXAM NECK SPINE 2-3 VW: CPT | Mod: TC | Performed by: RADIOLOGY

## 2025-06-18 PROCEDURE — 1125F AMNT PAIN NOTED PAIN PRSNT: CPT | Performed by: NURSE PRACTITIONER

## 2025-06-18 RX ORDER — CYCLOBENZAPRINE HCL 10 MG
5-10 TABLET ORAL 3 TIMES DAILY PRN
Qty: 30 TABLET | Refills: 2 | Status: SHIPPED | OUTPATIENT
Start: 2025-06-18

## 2025-06-18 RX ORDER — SEMAGLUTIDE 0.5 MG/.5ML
0.5 INJECTION, SOLUTION SUBCUTANEOUS WEEKLY
Qty: 2 ML | Refills: 0 | Status: SHIPPED | OUTPATIENT
Start: 2025-07-16 | End: 2025-08-13

## 2025-06-18 RX ORDER — SEMAGLUTIDE 1 MG/.5ML
1 INJECTION, SOLUTION SUBCUTANEOUS WEEKLY
Qty: 2 ML | Refills: 1 | Status: SHIPPED | OUTPATIENT
Start: 2025-08-17

## 2025-06-18 RX ORDER — SEMAGLUTIDE 0.25 MG/.5ML
0.25 INJECTION, SOLUTION SUBCUTANEOUS WEEKLY
Qty: 2 ML | Refills: 0 | Status: SHIPPED | OUTPATIENT
Start: 2025-06-18 | End: 2025-07-16

## 2025-06-18 ASSESSMENT — PAIN SCALES - GENERAL: PAINLEVEL_OUTOF10: MODERATE PAIN (5)

## 2025-06-18 ASSESSMENT — PATIENT HEALTH QUESTIONNAIRE - PHQ9
10. IF YOU CHECKED OFF ANY PROBLEMS, HOW DIFFICULT HAVE THESE PROBLEMS MADE IT FOR YOU TO DO YOUR WORK, TAKE CARE OF THINGS AT HOME, OR GET ALONG WITH OTHER PEOPLE: NOT DIFFICULT AT ALL
SUM OF ALL RESPONSES TO PHQ QUESTIONS 1-9: 4
SUM OF ALL RESPONSES TO PHQ QUESTIONS 1-9: 4

## 2025-06-18 ASSESSMENT — ENCOUNTER SYMPTOMS: BACK PAIN: 1
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
## 2025-06-18 NOTE — PATIENT INSTRUCTIONS
Based on our discussion, I have outlined the following instructions for you:      - Get an X-ray of your neck to check its condition.  - Depending on the X-ray results, think about starting physical therapy or seeing a chiropractor.  - Your prescription for Flexeril has been refilled.  - Plan to start the Wegovy - take 0.25 mg once weekly for 28 days, then increase to 0.5 mg every 7 days for 28 days, then increase to 1 mg every 7 days.  Plan follow up in clinic after on the 1 mg dose for a month.   - schedule your physical    Thank you again for your visit, and we look forward to supporting you in your journey to better health.

## 2025-06-18 NOTE — PROGRESS NOTES
{PROVIDER CHARTING PREFERENCE:080541}    Gael Ugalde is a 39 year old, presenting for the following health issues:  Back Pain      6/18/2025     9:46 AM   Additional Questions   Roomed by Michelle PORTILLO   Accompanied by self         6/18/2025     9:46 AM   Patient Reported Additional Medications   Patient reports taking the following new medications no new meds     HPI      {SUPERLIST (Optional):968919}  {additonal problems for provider to add (Optional):103317}    {ROS Picklists (Optional):849627}      Objective    LMP 09/27/2024 (Approximate)   There is no height or weight on file to calculate BMI.  Physical Exam   {Exam List (Optional):753004}    {Diagnostic Test Results (Optional):458917}        Signed Electronically by: DEON Torres CNP  {Email feedback regarding this note to [email or web address]   :[id number]}

## 2025-06-24 ENCOUNTER — RESULTS FOLLOW-UP (OUTPATIENT)
Dept: FAMILY MEDICINE | Facility: CLINIC | Age: 40
End: 2025-06-24

## 2025-07-15 ENCOUNTER — PATIENT OUTREACH (OUTPATIENT)
Dept: CARE COORDINATION | Facility: CLINIC | Age: 40
End: 2025-07-15
Payer: COMMERCIAL

## 2025-07-22 ENCOUNTER — VIRTUAL VISIT (OUTPATIENT)
Dept: URGENT CARE | Facility: CLINIC | Age: 40
End: 2025-07-22
Payer: COMMERCIAL

## 2025-07-22 DIAGNOSIS — M54.50 ACUTE BILATERAL LOW BACK PAIN WITHOUT SCIATICA: Primary | ICD-10-CM

## 2025-07-22 PROCEDURE — 98005 SYNCH AUDIO-VIDEO EST LOW 20: CPT

## 2025-07-22 RX ORDER — METHYLPREDNISOLONE 4 MG/1
TABLET ORAL
Qty: 21 TABLET | Refills: 0 | Status: SHIPPED | OUTPATIENT
Start: 2025-07-22

## 2025-07-23 ENCOUNTER — PATIENT OUTREACH (OUTPATIENT)
Dept: CARE COORDINATION | Facility: CLINIC | Age: 40
End: 2025-07-23
Payer: COMMERCIAL

## 2025-07-23 NOTE — PROGRESS NOTES
Tram is a 40 year old female who presents for a billable video visit.    ASSESSMENT/PLAN:    ICD-10-CM    1. Acute bilateral low back pain without sciatica  M54.50 Spine  Referral     methylPREDNISolone (MEDROL DOSEPAK) 4 MG tablet therapy pack          Based on history and exam, the most likely etiology of this patient's back pain is muscle spasm/strain.  Emergent MRI is not indicated as this patient does not have new weakness,  or cauda equina syndrome, or bowel or bladder incontinence.  Prescription of Medrol Dosepak sent to local pharmacy to aid with symptoms; side effects of medication discussed with patient.  Referral to spine providers for further evaluation placed.  Supportive measures also outlined in after visit summary and discussed.    Patient to follow up with PCP if no improvement over the next 7-14 days.      Will seek emergency care with new weakness/paresthesias, loss of continence, fever or worsening symptoms.    Patient verbalized understanding and is agreeable to plan.     SUBJECTIVE:  Tram Bal is a 40 year old who presents for bilateral low back pain for 2 - 3 days.  She has had previous prescription of Flexeril and this was unhelpful.  Pain is positional with bending and lifting and is without radiation down the legs.   Precipitating factors: recent heavy lifting furniture this past wknd.   Prior history of back problems: recurrent self limited episodes of low back pain in the past.  Feels that this pain is similar to previous occurrences before and was last treated with prednisone burst 05/2025.  There is no fever/chills, urinary symptoms, numbness in the legs, change in bowel/bladder, or cauda equina symptoms.  No recent trauma/injury.       OTC: Heating pad and Ibuprofen    Review of Systems  All systems reviewed and negative except per HPI.      OBJECTIVE:  Vitals not done due to this being a virtual visit    GENERAL: alert and no distress  EYES: Eyes grossly normal to  inspection.  No discharge or erythema, or obvious scleral/conjunctival abnormalities.  RESP: No audible wheeze, cough, or visible cyanosis.    SKIN: Visible skin clear. No significant rash, abnormal pigmentation or lesions.  NEURO: Mentation and speech appropriate for age.  Comprehensive back pain exam:  Tenderness of bilateral lower lumbar and pain limits flexion and extension of back.  PSYCH: Appropriate affect, tone, and pace of words    Video-Visit Details    Type of service:  Video Visit  Video Start Time: 8:16 PM  Video End Time: 8:26 PM    Originating Location (pt. Location): Home    Distant Location (provider location):  [facility]     Platform used for Video Visit: [employer]

## 2025-07-23 NOTE — PATIENT INSTRUCTIONS
Perform hot epsom salt soaks, light stretching, arnica gel, and lidocaine patches as needed.     Take muscle relaxer as directed. This can make the patient sleepy, so do not drive while on it or perform important functions.     Ibuprofen 800 mg (4 of the 200 mg OTC tablets or 800 mg of the children's liquid) up to 3 times daily with food which may be alternated with Tylenol 500 to 1000 mg every 8 hours as needed. This would mean Ibuprofen, 4 hours later may take Tylenol, then 4 hours after Tylenol you may take Ibuprofen, and so on. If pain more managed, decrease doses.

## (undated) DEVICE — DAVINCI XI SEAL UNIVERSAL 5-12MM 470500

## (undated) DEVICE — CATH TRAY FOLEY SURESTEP 16FR W/URINE MTR STATLK LF A303416A

## (undated) DEVICE — SU MONOCRYL 4-0 PS-2 18" UND Y496G

## (undated) DEVICE — DAVINCI HOT SHEARS TIP COVER  400180

## (undated) DEVICE — SU VICRYL 0 CT-1 CR 8X27" UND JJ41G

## (undated) DEVICE — SUCTION CANISTER MEDIVAC LINER 3000ML W/LID 65651-530

## (undated) DEVICE — DRAPE SHEET REV FOLD 3/4 9349

## (undated) DEVICE — GOWN LG DISP 9515

## (undated) DEVICE — ANTIFOG SOLUTION SEE SHARP 150M TROCAR SWABS 30978

## (undated) DEVICE — PACK LAPAROSCOPY/PELVISCOPY STD

## (undated) DEVICE — SYR 50ML LL W/O NDL 309653

## (undated) DEVICE — SYR 10ML LL W/O NDL

## (undated) DEVICE — ESU CORD MONOPOLAR 10'  E0510

## (undated) DEVICE — ENDO TROCAR FIRST ENTRY KII FIOS Z-THRD 05X100MM CTF03

## (undated) DEVICE — SOL NACL 0.9% IRRIG 3000ML BAG 07972-08

## (undated) DEVICE — GLOVE BIOGEL PI MICRO SZ 6.0 48560

## (undated) DEVICE — STOCKING SLEEVE COMPRESSION CALF MED

## (undated) DEVICE — FILTER LAPROSHIELD SMOKE EVAC LSF1

## (undated) DEVICE — SYR 05ML LL W/O NDL

## (undated) DEVICE — DECANTER VIAL 2006S

## (undated) DEVICE — TUBING SUCTION 12"X1/4" N612

## (undated) DEVICE — ENDO TROCAR FIRST ENTRY KII FIOS ADV FIX 05X100MM CFF03

## (undated) DEVICE — ENDO TROCAR FIRST ENTRY KII FIOS Z-THRD 11X100MM CTF33

## (undated) DEVICE — SOL NACL 0.9% IRRIG 1000ML BOTTLE 2F7124

## (undated) DEVICE — SOL WATER IRRIG 1000ML BOTTLE 07139-09

## (undated) DEVICE — PACK LAP CHOLE SLC15LCFSD

## (undated) DEVICE — NDL INSUFFLATION 13GA 120MM C2201

## (undated) DEVICE — SU VICRYL 3-0 SH 27" J316H

## (undated) DEVICE — BLADE KNIFE SURG 11 371111

## (undated) DEVICE — LUBRICANT INST ELECTROLUBE EL101

## (undated) DEVICE — SOL NACL 0.9% IRRIG 1000ML BOTTLE 07138-09

## (undated) DEVICE — ESU GROUND PAD UNIVERSAL W/O CORD

## (undated) DEVICE — ANTIFOG SOLUTION SEE SHARP 150M TROCAR SWABS 30978 (COI)

## (undated) DEVICE — GLOVE BIOGEL PI MICRO INDICATOR UNDERGLOVE SZ 6.5 48965

## (undated) DEVICE — DRSG BANDAID 1X3" FABRIC CURITY LATEX FREE KC44101

## (undated) DEVICE — PACK LAPAROTOMY CUSTOM LAKES

## (undated) DEVICE — PREP CHLORAPREP 26ML TINTED ORANGE  260815

## (undated) DEVICE — LINEN TOWEL PACK X5 5464

## (undated) DEVICE — DAVINCI XI REDUCER 8-12MM 470381

## (undated) DEVICE — ESU LIGASURE LAPAROSCOPIC BLUNT TIP SEALER 5MMX37CM LF1837

## (undated) DEVICE — LIGHT HANDLE X2

## (undated) DEVICE — ENDO TROCAR SLEEVE KII ADV FIXATION 05X100MM CFS02

## (undated) DEVICE — SU VICRYL 0 CT-1 36" J946H

## (undated) DEVICE — PAD PERI INDIV WRAP 11" 2022

## (undated) DEVICE — SUCTION TIP YANKAUER W/O VENT BULBOUS TIP

## (undated) DEVICE — PREP CHLORAPREP 26ML TINTED HI-LITE ORANGE 930815

## (undated) DEVICE — DAVINCI XI GRASPER FENESTRATED TIP UP 8MM 470347

## (undated) DEVICE — SU DERMABOND ADVANCED .7ML DNX12

## (undated) DEVICE — DAVINCI XI NDL DRIVER LARGE 470006

## (undated) DEVICE — PREP CHLORHEXIDINE 4% 4OZ (HIBICLENS) 57504

## (undated) DEVICE — DAVINCI XI DRAPE ARM 470015

## (undated) DEVICE — GLOVE BIOGEL PI SZ 8.0 40880

## (undated) DEVICE — Device

## (undated) DEVICE — ESU HOLSTER PLASTIC DISP E2400

## (undated) DEVICE — GOWN IMPERVIOUS SPECIALTY XLG/XLONG 32474

## (undated) DEVICE — SUCTION IRR STRYKERFLOW II W/TIP 250-070-520

## (undated) DEVICE — DRAPE POUCH INSTRUMENT 3 POCKET 1018L

## (undated) DEVICE — SUCTION MANIFOLD NEPTUNE 2 SYS 1 PORT 702-025-000

## (undated) DEVICE — TUBING INSUFFLATION W/FILTER CPC TO LUER 620-030-301

## (undated) DEVICE — TUBING CYSTO/BLADDER IRRIG SET 80" 06544-01

## (undated) DEVICE — DAVINCI XI DRAPE COLUMN 470341

## (undated) DEVICE — SUCTION TIP YANKAUER STR K87

## (undated) DEVICE — DAVINCI XI OBTURATOR BLADELESS 8MM 470359

## (undated) RX ORDER — PROPOFOL 10 MG/ML
INJECTION, EMULSION INTRAVENOUS
Status: DISPENSED
Start: 2024-05-17

## (undated) RX ORDER — DEXAMETHASONE SODIUM PHOSPHATE 4 MG/ML
INJECTION, SOLUTION INTRA-ARTICULAR; INTRALESIONAL; INTRAMUSCULAR; INTRAVENOUS; SOFT TISSUE
Status: DISPENSED
Start: 2024-11-13

## (undated) RX ORDER — ACETAMINOPHEN 325 MG/1
TABLET ORAL
Status: DISPENSED
Start: 2024-11-13

## (undated) RX ORDER — KETOROLAC TROMETHAMINE 30 MG/ML
INJECTION, SOLUTION INTRAMUSCULAR; INTRAVENOUS
Status: DISPENSED
Start: 2024-05-17

## (undated) RX ORDER — ONDANSETRON 2 MG/ML
INJECTION INTRAMUSCULAR; INTRAVENOUS
Status: DISPENSED
Start: 2024-05-17

## (undated) RX ORDER — HYDROXYZINE HYDROCHLORIDE 25 MG/1
TABLET, FILM COATED ORAL
Status: DISPENSED
Start: 2024-11-13

## (undated) RX ORDER — BUPIVACAINE HYDROCHLORIDE 2.5 MG/ML
INJECTION, SOLUTION EPIDURAL; INFILTRATION; INTRACAUDAL
Status: DISPENSED
Start: 2024-11-13

## (undated) RX ORDER — FENTANYL CITRATE 50 UG/ML
INJECTION, SOLUTION INTRAMUSCULAR; INTRAVENOUS
Status: DISPENSED
Start: 2024-11-13

## (undated) RX ORDER — CEFAZOLIN SODIUM/WATER 2 G/20 ML
SYRINGE (ML) INTRAVENOUS
Status: DISPENSED
Start: 2024-05-17

## (undated) RX ORDER — GABAPENTIN 300 MG/1
CAPSULE ORAL
Status: DISPENSED
Start: 2024-11-13

## (undated) RX ORDER — LIDOCAINE HYDROCHLORIDE 10 MG/ML
INJECTION, SOLUTION EPIDURAL; INFILTRATION; INTRACAUDAL; PERINEURAL
Status: DISPENSED
Start: 2024-11-13

## (undated) RX ORDER — ONDANSETRON 2 MG/ML
INJECTION INTRAMUSCULAR; INTRAVENOUS
Status: DISPENSED
Start: 2024-11-13

## (undated) RX ORDER — PROPOFOL 10 MG/ML
INJECTION, EMULSION INTRAVENOUS
Status: DISPENSED
Start: 2024-11-13

## (undated) RX ORDER — HEPARIN SODIUM 5000 [USP'U]/.5ML
INJECTION, SOLUTION INTRAVENOUS; SUBCUTANEOUS
Status: DISPENSED
Start: 2024-05-17

## (undated) RX ORDER — ACETAMINOPHEN 325 MG/1
TABLET ORAL
Status: DISPENSED
Start: 2024-05-17

## (undated) RX ORDER — GLYCOPYRROLATE 0.2 MG/ML
INJECTION, SOLUTION INTRAMUSCULAR; INTRAVENOUS
Status: DISPENSED
Start: 2024-11-13

## (undated) RX ORDER — HYDROMORPHONE HYDROCHLORIDE 1 MG/ML
INJECTION, SOLUTION INTRAMUSCULAR; INTRAVENOUS; SUBCUTANEOUS
Status: DISPENSED
Start: 2024-05-17

## (undated) RX ORDER — HYDROCODONE BITARTRATE AND ACETAMINOPHEN 5; 325 MG/1; MG/1
TABLET ORAL
Status: DISPENSED
Start: 2024-05-17

## (undated) RX ORDER — PHENYLEPHRINE HYDROCHLORIDE 10 MG/ML
INJECTION INTRAVENOUS
Status: DISPENSED
Start: 2024-11-13

## (undated) RX ORDER — KETOROLAC TROMETHAMINE 30 MG/ML
INJECTION, SOLUTION INTRAMUSCULAR; INTRAVENOUS
Status: DISPENSED
Start: 2024-11-13

## (undated) RX ORDER — METRONIDAZOLE 500 MG/100ML
INJECTION, SOLUTION INTRAVENOUS
Status: DISPENSED
Start: 2024-11-13

## (undated) RX ORDER — EPHEDRINE SULFATE 50 MG/ML
INJECTION, SOLUTION INTRAMUSCULAR; INTRAVENOUS; SUBCUTANEOUS
Status: DISPENSED
Start: 2024-05-17

## (undated) RX ORDER — HYDROMORPHONE HCL IN WATER/PF 6 MG/30 ML
PATIENT CONTROLLED ANALGESIA SYRINGE INTRAVENOUS
Status: DISPENSED
Start: 2024-11-13

## (undated) RX ORDER — FENTANYL CITRATE 50 UG/ML
INJECTION, SOLUTION INTRAMUSCULAR; INTRAVENOUS
Status: DISPENSED
Start: 2024-05-17